# Patient Record
Sex: FEMALE | Race: WHITE | Employment: FULL TIME | URBAN - METROPOLITAN AREA
[De-identification: names, ages, dates, MRNs, and addresses within clinical notes are randomized per-mention and may not be internally consistent; named-entity substitution may affect disease eponyms.]

---

## 2017-02-02 NOTE — TELEPHONE ENCOUNTER
No future appointments. LOV 12/16 F/u ADD    LAST LAB    LAST RX  Rizatriptan Benzoate 10 MG Oral Tab 12 tablet 4 4/15/2016     Please advise on refill.

## 2017-02-03 RX ORDER — RIZATRIPTAN BENZOATE 10 MG/1
TABLET ORAL
Qty: 12 TABLET | Refills: 0 | Status: SHIPPED | OUTPATIENT
Start: 2017-02-03 | End: 2017-03-06

## 2017-03-07 RX ORDER — RIZATRIPTAN BENZOATE 10 MG/1
TABLET ORAL
Qty: 12 TABLET | Refills: 0 | Status: SHIPPED | OUTPATIENT
Start: 2017-03-07 | End: 2017-05-30

## 2017-03-07 NOTE — TELEPHONE ENCOUNTER
No future appointments. LOV  12/16    LAST LAB 8/16    LAST RX  RIZATRIPTAN BENZOATE 10 MG Oral Tab 12 tablet 0 2/3/2017        Please advise on refill.

## 2017-04-20 ENCOUNTER — TELEPHONE (OUTPATIENT)
Dept: FAMILY MEDICINE CLINIC | Facility: CLINIC | Age: 42
End: 2017-04-20

## 2017-04-20 RX ORDER — ALPRAZOLAM 0.25 MG/1
0.25 TABLET ORAL 2 TIMES DAILY PRN
Qty: 30 TABLET | Refills: 0 | Status: SHIPPED | OUTPATIENT
Start: 2017-04-20 | End: 2017-05-30 | Stop reason: ALTCHOICE

## 2017-04-20 NOTE — TELEPHONE ENCOUNTER
Emelyn called sobbing. I asked triage to  the line and was instructed to put her through to voicemail. Lawanna Duverney states her mom had a stroke and has been in a coma for a week. She says it doesn't look like she is going to make it.   Lawanna Duverney says she need

## 2017-04-20 NOTE — TELEPHONE ENCOUNTER
Spoke to the patient advised a Rx would be sent in for her. She has family with her. Send to pharmacy above.

## 2017-04-25 RX ORDER — RIZATRIPTAN BENZOATE 10 MG/1
TABLET ORAL
Qty: 12 TABLET | Refills: 0 | Status: SHIPPED | OUTPATIENT
Start: 2017-04-25 | End: 2017-05-30

## 2017-05-08 ENCOUNTER — OFFICE VISIT (OUTPATIENT)
Dept: FAMILY MEDICINE CLINIC | Facility: CLINIC | Age: 42
End: 2017-05-08

## 2017-05-08 VITALS
RESPIRATION RATE: 16 BRPM | OXYGEN SATURATION: 99 % | TEMPERATURE: 99 F | HEART RATE: 80 BPM | DIASTOLIC BLOOD PRESSURE: 68 MMHG | HEIGHT: 66.5 IN | SYSTOLIC BLOOD PRESSURE: 112 MMHG | BODY MASS INDEX: 21.52 KG/M2 | WEIGHT: 135.5 LBS

## 2017-05-08 DIAGNOSIS — Z79.899 ENCOUNTER FOR DRUG THERAPY: Primary | ICD-10-CM

## 2017-05-08 DIAGNOSIS — F33.1 MODERATE EPISODE OF RECURRENT MAJOR DEPRESSIVE DISORDER (HCC): ICD-10-CM

## 2017-05-08 PROCEDURE — 99213 OFFICE O/P EST LOW 20 MIN: CPT | Performed by: FAMILY MEDICINE

## 2017-05-08 RX ORDER — DULOXETIN HYDROCHLORIDE 30 MG/1
30 CAPSULE, DELAYED RELEASE ORAL DAILY
Qty: 30 CAPSULE | Refills: 0 | Status: SHIPPED | OUTPATIENT
Start: 2017-05-08 | End: 2017-05-30

## 2017-05-08 NOTE — PATIENT INSTRUCTIONS
Depression: Tips to Help Yourself  As your health care providers help treat your depression, you can also help yourself. Keep in mind that your illness affects you emotionally, physically, mentally, and socially. So full recovery will take time.  Take car © 5358-6254 The [address] [place] [address], [address]. All rights reserved. This information is not intended as a substitute for professional medical care. Always follow your healthcare professional's instructions.          Additional sources of help  In addition to a professional counselor, it may help to talk to other people in your life. You may find support and insight from:  · A close friend or family member. · A , , or rabbi trained in counseling.   · A lo

## 2017-05-15 NOTE — PROGRESS NOTES
Rafael Love is a 40year old female. Patient presents with:  Medication Follow-Up: Pt here to discuss medication with doctor. HPI:   Patient states her mother passed away recently. Has been under a lot of emotional stress.   Does have a past HYDROCHLOROTHIAZIDE 12.5 MG Oral Cap TAKE ONE CAPSULE BY MOUTH EVERY DAY Disp: 30 capsule Rfl: 2   RIZATRIPTAN BENZOATE 10 MG Oral Tab TAKE 1 TABLET BY MOUTH AS NEEDED FOR MIGRAINE( AS DIRECTED UP TO 2 TABLETS PER DAY) Disp: 12 tablet Rfl: 0   ALPRAZolam visit:    Encounter for drug therapy    Moderate episode of recurrent major depressive disorder (HCC)  -     DULoxetine HCl (CYMBALTA) 30 MG Oral Cap DR Particles; Take 1 capsule (30 mg total) by mouth daily.

## 2017-05-18 RX ORDER — FAMCICLOVIR 500 MG/1
TABLET, FILM COATED ORAL
Qty: 20 TABLET | Refills: 0 | Status: SHIPPED | OUTPATIENT
Start: 2017-05-18 | End: 2018-03-05 | Stop reason: ALTCHOICE

## 2017-05-18 NOTE — TELEPHONE ENCOUNTER
Herpes Agent Protocol Passed5/18 2:56 AM   Appointment in the past 12 or next 3 months        Future Appointments  Date Time Provider Julian Monique   6/5/2017 8:00 AM Donald Appiah MD [facility] [address]     Last refill 8/1/16 90 +1    refilled per p

## 2017-05-19 ENCOUNTER — PATIENT MESSAGE (OUTPATIENT)
Dept: FAMILY MEDICINE CLINIC | Facility: CLINIC | Age: 42
End: 2017-05-19

## 2017-05-19 NOTE — TELEPHONE ENCOUNTER
From: Joannie Jeans  To: Viji Groves MD  Sent: 5/19/2017 1:57 PM CDT  Subject: Prescription Question    Hi Dr. Fareed Lang,  I have been taking the Celebrex and the side effects have not really subsided.   The nausea and gas (yuck) was not massiel

## 2017-05-29 NOTE — TELEPHONE ENCOUNTER
LOV 5-8-17  Future Appointments  Date Time Provider Julian Belli   5/30/2017 8:15 AM Tete Quinn MD [facility] [address]         LAST LAB 9/4/15    LAST RX  4/25/17  #12 NR    PROTOCOL None

## 2017-05-30 ENCOUNTER — OFFICE VISIT (OUTPATIENT)
Dept: FAMILY MEDICINE CLINIC | Facility: CLINIC | Age: 42
End: 2017-05-30

## 2017-05-30 VITALS
HEART RATE: 92 BPM | WEIGHT: 136.5 LBS | TEMPERATURE: 98 F | SYSTOLIC BLOOD PRESSURE: 112 MMHG | BODY MASS INDEX: 22 KG/M2 | OXYGEN SATURATION: 99 % | RESPIRATION RATE: 18 BRPM | DIASTOLIC BLOOD PRESSURE: 72 MMHG

## 2017-05-30 DIAGNOSIS — G43.009 MIGRAINE WITHOUT AURA AND WITHOUT STATUS MIGRAINOSUS, NOT INTRACTABLE: ICD-10-CM

## 2017-05-30 DIAGNOSIS — F33.1 MODERATE EPISODE OF RECURRENT MAJOR DEPRESSIVE DISORDER (HCC): ICD-10-CM

## 2017-05-30 DIAGNOSIS — F90.0 ATTENTION DEFICIT HYPERACTIVITY DISORDER (ADHD), PREDOMINANTLY INATTENTIVE TYPE: ICD-10-CM

## 2017-05-30 DIAGNOSIS — Z51.81 ENCOUNTER FOR THERAPEUTIC DRUG MONITORING: Primary | ICD-10-CM

## 2017-05-30 DIAGNOSIS — B00.1 RECURRENT COLD SORES: ICD-10-CM

## 2017-05-30 PROCEDURE — 99214 OFFICE O/P EST MOD 30 MIN: CPT | Performed by: FAMILY MEDICINE

## 2017-05-30 RX ORDER — DEXTROAMPHETAMINE SACCHARATE, AMPHETAMINE ASPARTATE MONOHYDRATE, DEXTROAMPHETAMINE SULFATE AND AMPHETAMINE SULFATE 5; 5; 5; 5 MG/1; MG/1; MG/1; MG/1
20 CAPSULE, EXTENDED RELEASE ORAL DAILY
Qty: 30 CAPSULE | Refills: 0 | Status: SHIPPED | OUTPATIENT
Start: 2017-06-29 | End: 2017-08-14 | Stop reason: DRUGHIGH

## 2017-05-30 RX ORDER — RIZATRIPTAN BENZOATE 10 MG/1
TABLET ORAL
Qty: 12 TABLET | Refills: 2 | Status: SHIPPED | OUTPATIENT
Start: 2017-05-30 | End: 2017-09-01

## 2017-05-30 RX ORDER — DEXTROAMPHETAMINE SACCHARATE, AMPHETAMINE ASPARTATE MONOHYDRATE, DEXTROAMPHETAMINE SULFATE AND AMPHETAMINE SULFATE 5; 5; 5; 5 MG/1; MG/1; MG/1; MG/1
20 CAPSULE, EXTENDED RELEASE ORAL DAILY
Qty: 30 CAPSULE | Refills: 0 | Status: SHIPPED | OUTPATIENT
Start: 2017-07-29 | End: 2017-08-14 | Stop reason: DRUGHIGH

## 2017-05-30 RX ORDER — FAMCICLOVIR 250 MG/1
250 TABLET, FILM COATED ORAL DAILY
Qty: 90 TABLET | Refills: 1 | COMMUNITY
Start: 2017-05-30 | End: 2017-05-30

## 2017-05-30 RX ORDER — RIZATRIPTAN BENZOATE 10 MG/1
TABLET ORAL
Qty: 12 TABLET | Refills: 0 | OUTPATIENT
Start: 2017-05-30

## 2017-05-30 RX ORDER — DEXTROAMPHETAMINE SACCHARATE, AMPHETAMINE ASPARTATE, DEXTROAMPHETAMINE SULFATE AND AMPHETAMINE SULFATE 7.5; 7.5; 7.5; 7.5 MG/1; MG/1; MG/1; MG/1
30 TABLET ORAL DAILY
Qty: 30 TABLET | Refills: 0 | Status: SHIPPED | OUTPATIENT
Start: 2017-06-29 | End: 2017-07-29

## 2017-05-30 RX ORDER — FAMCICLOVIR 250 MG/1
250 TABLET, FILM COATED ORAL DAILY
Qty: 90 TABLET | Refills: 1 | Status: SHIPPED | OUTPATIENT
Start: 2017-05-30 | End: 2017-10-23

## 2017-05-30 RX ORDER — DEXTROAMPHETAMINE SACCHARATE, AMPHETAMINE ASPARTATE MONOHYDRATE, DEXTROAMPHETAMINE SULFATE AND AMPHETAMINE SULFATE 5; 5; 5; 5 MG/1; MG/1; MG/1; MG/1
20 CAPSULE, EXTENDED RELEASE ORAL DAILY
Qty: 30 CAPSULE | Refills: 0 | Status: SHIPPED | OUTPATIENT
Start: 2017-05-30 | End: 2017-08-14 | Stop reason: DRUGHIGH

## 2017-05-30 RX ORDER — DEXTROAMPHETAMINE SACCHARATE, AMPHETAMINE ASPARTATE, DEXTROAMPHETAMINE SULFATE AND AMPHETAMINE SULFATE 7.5; 7.5; 7.5; 7.5 MG/1; MG/1; MG/1; MG/1
30 TABLET ORAL DAILY
Qty: 30 TABLET | Refills: 0 | Status: SHIPPED | OUTPATIENT
Start: 2017-05-30 | End: 2017-06-29

## 2017-05-30 RX ORDER — BUPROPION HYDROCHLORIDE 150 MG/1
150 TABLET ORAL DAILY
Qty: 30 TABLET | Refills: 0 | Status: SHIPPED | OUTPATIENT
Start: 2017-05-30 | End: 2017-06-12

## 2017-05-30 RX ORDER — DEXTROAMPHETAMINE SACCHARATE, AMPHETAMINE ASPARTATE, DEXTROAMPHETAMINE SULFATE AND AMPHETAMINE SULFATE 7.5; 7.5; 7.5; 7.5 MG/1; MG/1; MG/1; MG/1
30 TABLET ORAL DAILY
Qty: 30 TABLET | Refills: 0 | Status: SHIPPED | OUTPATIENT
Start: 2017-07-29 | End: 2017-08-28

## 2017-05-30 NOTE — PATIENT INSTRUCTIONS
Counseling for Depression  Counseling, also called talk therapy, has been found to be as effective as medication in treating mild to moderate depression.  When done by a trained professional, this treatment is a powerful way to better understand your thou · A local support group or community group. · A 12-step program (such as Alcoholics Anonymous) for dealing with problems that can contribute to depression, such as alcohol or drug addiction.   Date Last Reviewed: 1/19/2015  © 4553-5207 The [employer]

## 2017-05-30 NOTE — PROGRESS NOTES
Winsome Lim is a 40year old female. Patient presents with: Follow - Up: Pt here to discuss changing her medication currently not doing well on it.     HPI:   Depression: Patient is seen for follow-up after starting Cymbalta, and patient states (ADDERALL XR) 20 MG Oral Capsule SR 24 Hr Take 1 capsule (20 mg total) by mouth daily. Disp: 30 capsule Rfl: 0   [START ON 7/29/2017] Amphetamine-Dextroamphet ER (ADDERALL XR) 20 MG Oral Capsule SR 24 Hr Take 1 capsule (20 mg total) by mouth daily.  Disp: 3 simplex      lips, monthly.    • ADD (attention deficit disorder)    • Unspecified hypothyroidism    • Chronic migraine without aura, without mention of intractable migraine without mention of status migrainosus    • Contact with or exposure to tuberculosis Amphetamine-Dextroamphet ER (ADDERALL XR) 20 MG Oral Capsule SR 24 Hr; Take 1 capsule (20 mg total) by mouth daily. -     amphetamine-dextroamphetamine (ADDERALL) 30 MG Oral Tab; Take 1 tablet (30 mg total) by mouth daily.   -     amphetamine-dextroampheta

## 2017-06-12 ENCOUNTER — OFFICE VISIT (OUTPATIENT)
Dept: FAMILY MEDICINE CLINIC | Facility: CLINIC | Age: 42
End: 2017-06-12

## 2017-06-12 VITALS
SYSTOLIC BLOOD PRESSURE: 112 MMHG | WEIGHT: 133.38 LBS | TEMPERATURE: 99 F | BODY MASS INDEX: 21 KG/M2 | HEART RATE: 98 BPM | OXYGEN SATURATION: 99 % | RESPIRATION RATE: 18 BRPM | DIASTOLIC BLOOD PRESSURE: 62 MMHG

## 2017-06-12 DIAGNOSIS — F33.1 MODERATE EPISODE OF RECURRENT MAJOR DEPRESSIVE DISORDER (HCC): ICD-10-CM

## 2017-06-12 DIAGNOSIS — Z51.81 ENCOUNTER FOR THERAPEUTIC DRUG MONITORING: Primary | ICD-10-CM

## 2017-06-12 PROCEDURE — 99213 OFFICE O/P EST LOW 20 MIN: CPT | Performed by: FAMILY MEDICINE

## 2017-06-12 RX ORDER — BUPROPION HYDROCHLORIDE 150 MG/1
150 TABLET ORAL DAILY
Qty: 30 TABLET | Refills: 0 | Status: SHIPPED | OUTPATIENT
Start: 2017-06-12 | End: 2017-08-04

## 2017-06-12 NOTE — PROGRESS NOTES
Sandie Urrutia is a 40year old female. Patient presents with: Follow - Up: Pt her for 2 week f/u of new medication states she is doing ok on it.     HPI:   Patient is seen for follow up of depression, states is tolerating wellbutrin better, does total) by mouth daily. Disp: 30 capsule Rfl: 0   [START ON 6/29/2017] amphetamine-dextroamphetamine (ADDERALL) 30 MG Oral Tab Take 1 tablet (30 mg total) by mouth daily.  Disp: 30 tablet Rfl: 0   [START ON 7/29/2017] amphetamine-dextroamphetamine (ADDERALL)

## 2017-08-04 ENCOUNTER — TELEPHONE (OUTPATIENT)
Dept: FAMILY MEDICINE CLINIC | Facility: CLINIC | Age: 42
End: 2017-08-04

## 2017-08-04 DIAGNOSIS — F33.1 MODERATE EPISODE OF RECURRENT MAJOR DEPRESSIVE DISORDER (HCC): ICD-10-CM

## 2017-08-04 RX ORDER — BUPROPION HYDROCHLORIDE 150 MG/1
150 TABLET ORAL DAILY
Qty: 15 TABLET | Refills: 0 | Status: SHIPPED | OUTPATIENT
Start: 2017-08-04 | End: 2017-08-14 | Stop reason: DRUGHIGH

## 2017-08-04 NOTE — TELEPHONE ENCOUNTER
Left message for pt    Dr Isaias Morris refilled x 15.  Pt should stay on meds up through appt    Future Appointments  Date Time Provider Julian Monique   8/11/2017 9:30 AM Alfredito Reed MD [facility] [address] [facility]

## 2017-08-04 NOTE — TELEPHONE ENCOUNTER
Pt was recently put on depression medication and was unable to make her f/u appt. She is now down to 5 days left and her appt is for 8/11/17. She is ok with out the pills until then but is concerned whether Dr is ok with her being with out medication.  If s

## 2017-08-14 ENCOUNTER — OFFICE VISIT (OUTPATIENT)
Dept: FAMILY MEDICINE CLINIC | Facility: CLINIC | Age: 42
End: 2017-08-14

## 2017-08-14 VITALS
BODY MASS INDEX: 22 KG/M2 | HEART RATE: 84 BPM | WEIGHT: 140.25 LBS | SYSTOLIC BLOOD PRESSURE: 112 MMHG | DIASTOLIC BLOOD PRESSURE: 72 MMHG | OXYGEN SATURATION: 99 % | TEMPERATURE: 99 F | RESPIRATION RATE: 16 BRPM

## 2017-08-14 DIAGNOSIS — F33.1 MODERATE EPISODE OF RECURRENT MAJOR DEPRESSIVE DISORDER (HCC): ICD-10-CM

## 2017-08-14 DIAGNOSIS — E03.9 HYPOTHYROIDISM, UNSPECIFIED TYPE: ICD-10-CM

## 2017-08-14 DIAGNOSIS — F98.8 ATTENTION DEFICIT DISORDER (ADD) WITHOUT HYPERACTIVITY: ICD-10-CM

## 2017-08-14 DIAGNOSIS — Z79.899 ENCOUNTER FOR LONG-TERM CURRENT USE OF MEDICATION: Primary | ICD-10-CM

## 2017-08-14 PROCEDURE — 99214 OFFICE O/P EST MOD 30 MIN: CPT | Performed by: FAMILY MEDICINE

## 2017-08-14 RX ORDER — BUPROPION HYDROCHLORIDE 150 MG/1
TABLET ORAL
Qty: 15 TABLET | Refills: 0 | OUTPATIENT
Start: 2017-08-14

## 2017-08-14 RX ORDER — DEXTROAMPHETAMINE SACCHARATE, AMPHETAMINE ASPARTATE, DEXTROAMPHETAMINE SULFATE AND AMPHETAMINE SULFATE 7.5; 7.5; 7.5; 7.5 MG/1; MG/1; MG/1; MG/1
30 TABLET ORAL 2 TIMES DAILY
Qty: 60 TABLET | Refills: 0 | Status: SHIPPED | OUTPATIENT
Start: 2017-09-02 | End: 2017-10-02

## 2017-08-14 RX ORDER — LEVOTHYROXINE SODIUM 0.03 MG/1
25 TABLET ORAL DAILY
Qty: 90 TABLET | Refills: 3 | Status: SHIPPED | OUTPATIENT
Start: 2017-08-14 | End: 2018-08-24

## 2017-08-14 RX ORDER — BUPROPION HYDROCHLORIDE 300 MG/1
300 TABLET ORAL DAILY
Qty: 30 TABLET | Refills: 2 | Status: SHIPPED | OUTPATIENT
Start: 2017-08-14 | End: 2017-09-13

## 2017-08-14 RX ORDER — DEXTROAMPHETAMINE SACCHARATE, AMPHETAMINE ASPARTATE, DEXTROAMPHETAMINE SULFATE AND AMPHETAMINE SULFATE 7.5; 7.5; 7.5; 7.5 MG/1; MG/1; MG/1; MG/1
30 TABLET ORAL 2 TIMES DAILY
Qty: 60 TABLET | Refills: 0 | Status: SHIPPED | OUTPATIENT
Start: 2017-10-03 | End: 2017-11-02

## 2017-08-14 RX ORDER — DEXTROAMPHETAMINE SACCHARATE, AMPHETAMINE ASPARTATE, DEXTROAMPHETAMINE SULFATE AND AMPHETAMINE SULFATE 7.5; 7.5; 7.5; 7.5 MG/1; MG/1; MG/1; MG/1
30 TABLET ORAL 2 TIMES DAILY
Qty: 60 TABLET | Refills: 0 | Status: SHIPPED | OUTPATIENT
Start: 2017-11-03 | End: 2017-12-03

## 2017-08-14 NOTE — PATIENT INSTRUCTIONS
Please take the increased dose of wellbutrin as prescribed, If you notice any side effects please send me a message.

## 2017-08-14 NOTE — PROGRESS NOTES
Allyn Mayo is a 40year old female. Patient presents with:  Medication Follow-Up: one month f/u. Wellbutrin. Requesting refill for Adderall. HPI:   ADD: Patient is seen for follow-up and medication refill.   States has been taking 30 mg in MG Oral Tab Take 1 tablet (30 mg total) by mouth daily. Disp: 30 tablet Rfl: 0   OCELLA 3-0.03 MG Oral Tab TAKE 1 TABLET BY MOUTH EVERY DAY Disp: 28 tablet Rfl: 5   Levothyroxine Sodium (SYNTHROID) 25 MCG Oral Tab Take 1 tablet by mouth daily.  Disp: 90 tab edema  PSYCH: well groomed, appropriate mood and affect, good eye contact, not thought disorder  NEURO: DTR 2+ and symmetrical bilateral    ASSESSMENT AND PLAN:   Yocasta Wood was seen today for medication follow-up.     Diagnoses and all orders for this visit:

## 2017-09-01 DIAGNOSIS — G43.009 MIGRAINE WITHOUT AURA AND WITHOUT STATUS MIGRAINOSUS, NOT INTRACTABLE: ICD-10-CM

## 2017-09-01 RX ORDER — RIZATRIPTAN BENZOATE 10 MG/1
TABLET ORAL
Qty: 12 TABLET | Refills: 0 | Status: SHIPPED | OUTPATIENT
Start: 2017-09-01 | End: 2017-12-04

## 2017-09-19 RX ORDER — DROSPIRENONE AND ETHINYL ESTRADIOL 0.03MG-3MG
KIT ORAL
Qty: 84 TABLET | Refills: 1 | OUTPATIENT
Start: 2017-09-19

## 2017-09-19 NOTE — TELEPHONE ENCOUNTER
No future appointments. LOV 12/16     LAST LAB no pap in chart last one in 5/2012 in [facility]  No mammogram in chart ordered but not done.      LAST RX   OCELLA 3-0.03 MG Oral Tab 28 tablet 5 12/16/2016       PROTOCOL    Gynecology Medication Protocol Azell Warrick

## 2017-10-16 DIAGNOSIS — G43.009 MIGRAINE WITHOUT AURA AND WITHOUT STATUS MIGRAINOSUS, NOT INTRACTABLE: ICD-10-CM

## 2017-10-16 NOTE — TELEPHONE ENCOUNTER
Future Appointments  Date Time Provider Julian Amaya   10/23/2017 9:30 AM Parul Chavez MD [facility] [address]     LOV 5/17    LAST LAB 9/15    LAST RX  RIZATRIPTAN BENZOATE 10 MG Oral Tab 12 tablet 0 9/1/2017       Please advise. No protocol.  If fi

## 2017-10-17 NOTE — TELEPHONE ENCOUNTER
Left detailed message for patient on phone. That PCP would prefer to fill at her appointment. But if refill is needed to call back.

## 2017-10-19 ENCOUNTER — PATIENT MESSAGE (OUTPATIENT)
Dept: FAMILY MEDICINE CLINIC | Facility: CLINIC | Age: 42
End: 2017-10-19

## 2017-10-19 DIAGNOSIS — G43.009 MIGRAINE WITHOUT AURA AND WITHOUT STATUS MIGRAINOSUS, NOT INTRACTABLE: ICD-10-CM

## 2017-10-19 NOTE — TELEPHONE ENCOUNTER
From: Temo Gtz  Sent: 10/19/2017  3:10 AM CDT  Subject: Medication Renewal Request    Flakita Garcia.  Leodan Hoffmann would like a refill of the following medications:        RIZATRIPTAN BENZOATE 10 MG Oral Tab [GERMAN Townsend MD]      Patient Comment

## 2017-10-19 NOTE — TELEPHONE ENCOUNTER
Sent another message to pt to see if she will be out before her upcoming appt    We did leave a message on 10/16/17 and never heard back    Future Appointments  Date Time Provider Julian Amaya   10/23/2017 9:30 AM Nikki Gonzales MD [facility] [address]

## 2017-10-19 NOTE — TELEPHONE ENCOUNTER
From: Jessica Thomson  To: Nikki Gonzales MD  Sent: 10/19/2017 3:19 AM CDT  Subject: Prescription Question    Dr. Светлана Mackenzie,  As you recommended, I have been seeing a therapist in light of my mother’s sudden death.   She feels Wellbutrin isn’t wor

## 2017-10-23 ENCOUNTER — OFFICE VISIT (OUTPATIENT)
Dept: FAMILY MEDICINE CLINIC | Facility: CLINIC | Age: 42
End: 2017-10-23

## 2017-10-23 VITALS
RESPIRATION RATE: 16 BRPM | DIASTOLIC BLOOD PRESSURE: 68 MMHG | TEMPERATURE: 98 F | WEIGHT: 141.13 LBS | SYSTOLIC BLOOD PRESSURE: 100 MMHG | OXYGEN SATURATION: 98 % | HEART RATE: 76 BPM | HEIGHT: 66.5 IN | BODY MASS INDEX: 22.42 KG/M2

## 2017-10-23 DIAGNOSIS — F33.1 MODERATE EPISODE OF RECURRENT MAJOR DEPRESSIVE DISORDER (HCC): ICD-10-CM

## 2017-10-23 DIAGNOSIS — G43.009 MIGRAINE WITHOUT AURA AND WITHOUT STATUS MIGRAINOSUS, NOT INTRACTABLE: ICD-10-CM

## 2017-10-23 DIAGNOSIS — R09.82 POST-NASAL DRAINAGE: ICD-10-CM

## 2017-10-23 DIAGNOSIS — B00.1 RECURRENT COLD SORES: ICD-10-CM

## 2017-10-23 DIAGNOSIS — Z00.00 ROUTINE GENERAL MEDICAL EXAMINATION AT A HEALTH CARE FACILITY: Primary | ICD-10-CM

## 2017-10-23 DIAGNOSIS — Z12.39 SCREENING FOR BREAST CANCER: ICD-10-CM

## 2017-10-23 DIAGNOSIS — Z30.41 SURVEILLANCE OF CONTRACEPTIVE PILL: ICD-10-CM

## 2017-10-23 DIAGNOSIS — Z01.419 ENCOUNTER FOR ROUTINE GYNECOLOGICAL EXAMINATION WITH PAPANICOLAOU SMEAR OF CERVIX: ICD-10-CM

## 2017-10-23 PROCEDURE — 88175 CYTOPATH C/V AUTO FLUID REDO: CPT | Performed by: FAMILY MEDICINE

## 2017-10-23 PROCEDURE — 99213 OFFICE O/P EST LOW 20 MIN: CPT | Performed by: FAMILY MEDICINE

## 2017-10-23 PROCEDURE — 87624 HPV HI-RISK TYP POOLED RSLT: CPT | Performed by: FAMILY MEDICINE

## 2017-10-23 PROCEDURE — 99396 PREV VISIT EST AGE 40-64: CPT | Performed by: FAMILY MEDICINE

## 2017-10-23 RX ORDER — SERTRALINE HYDROCHLORIDE 25 MG/1
TABLET, FILM COATED ORAL
Qty: 45 TABLET | Refills: 0 | Status: SHIPPED | OUTPATIENT
Start: 2017-10-23 | End: 2017-11-17

## 2017-10-23 RX ORDER — FAMCICLOVIR 250 MG/1
250 TABLET, FILM COATED ORAL DAILY
Qty: 90 TABLET | Refills: 1 | Status: SHIPPED | OUTPATIENT
Start: 2017-10-23 | End: 2019-01-14

## 2017-10-23 RX ORDER — RIZATRIPTAN BENZOATE 10 MG/1
TABLET ORAL
Qty: 12 TABLET | Refills: 0
Start: 2017-10-23

## 2017-10-23 RX ORDER — RIZATRIPTAN BENZOATE 10 MG/1
TABLET ORAL
Qty: 12 TABLET | Refills: 0 | OUTPATIENT
Start: 2017-10-23

## 2017-10-23 RX ORDER — DROSPIRENONE AND ETHINYL ESTRADIOL 0.03MG-3MG
1 KIT ORAL
Qty: 3 PACKAGE | Refills: 3 | Status: SHIPPED | OUTPATIENT
Start: 2017-10-23 | End: 2018-03-03

## 2017-10-23 RX ORDER — RIZATRIPTAN BENZOATE 10 MG/1
TABLET ORAL
Qty: 12 TABLET | Refills: 3 | Status: SHIPPED | OUTPATIENT
Start: 2017-10-23 | End: 2018-02-12

## 2017-10-23 NOTE — PATIENT INSTRUCTIONS
Take over the counter claritin D and flonase for 1 week. Please take Wellbutrin ever other day for 1 week and stop, start zoloft 25 mg for 1 week and then increase to 50mg.       Prevention Guidelines, Women Ages 36 to 52  Screening tests and vaccines ar HIV All women At routine exams   Obesity All women in this age group At routine exams   Syphilis Women at increased risk for infection – talk with your healthcare provider At routine exams   Tuberculosis Women at increased risk for infection – talk with yo BRCA gene mutation testing for breast and ovarian cancer susceptibility Women with increased risk for having gene mutation When your risk is known   Breast cancer and chemoprevention Women at high risk for breast cancer When your risk is known   Diet and e

## 2017-10-30 NOTE — PROGRESS NOTES
Jacque Daniel is a 40year old female here for   Patient presents with:   Well Adult: Cpx and pap    HPI:   Patient is seen today for her annual physical and pap    Migraine: patient states takes rizatriptan a couple of times a week and is better w tablet Rfl: 0   [START ON 11/3/2017] amphetamine-dextroamphetamine (ADDERALL) 30 MG Oral Tab Take 1 tablet (30 mg total) by mouth 2 (two) times daily.  Disp: 60 tablet Rfl: 0   Levothyroxine Sodium (SYNTHROID) 25 MCG Oral Tab Take 1 tablet (25 mcg total) by No change in appetite, heartburn, diarrhea, constipation, or blood in stool. No hemorrhoids. : No pain, frequency, urgency or incontinence with urination.   Female: periods are regular, not excessive, no dysmenorrhea, no sexual dysfuction  Rheumatologic: facility  -     CBC WITH DIFFERENTIAL WITH PLATELET; Future  -     ASSAY, THYROID STIM HORMONE; Future  -     LIPID PANEL; Future  -     COMP METABOLIC PANEL (14);  Future  -     VITAMIN D, 25-HYDROXY; Future    Encounter for routine gynecological examinati

## 2017-11-16 DIAGNOSIS — F33.1 MODERATE EPISODE OF RECURRENT MAJOR DEPRESSIVE DISORDER (HCC): ICD-10-CM

## 2017-11-16 RX ORDER — SERTRALINE HYDROCHLORIDE 25 MG/1
TABLET, FILM COATED ORAL
Qty: 45 TABLET | Refills: 0 | Status: CANCELLED | OUTPATIENT
Start: 2017-11-16

## 2017-11-17 ENCOUNTER — PATIENT MESSAGE (OUTPATIENT)
Dept: FAMILY MEDICINE CLINIC | Facility: CLINIC | Age: 42
End: 2017-11-17

## 2017-11-17 DIAGNOSIS — F33.1 MODERATE EPISODE OF RECURRENT MAJOR DEPRESSIVE DISORDER (HCC): ICD-10-CM

## 2017-11-17 NOTE — TELEPHONE ENCOUNTER
From: Alcides List  Sent: 11/17/2017 8:40 AM CST  Subject: Medication Renewal Request    Wendi Armin.  Maciel Ratliff would like a refill of the following medications:     Sertraline HCl (ZOLOFT) 25 MG Oral Tab [[id number] MD Kristian]   Patient Comment: Cu

## 2017-11-17 NOTE — TELEPHONE ENCOUNTER
Instructions         Return in about 1 month (around 11/23/2017).      Pt was given one month refill at Physical 10/23/17    PLease call pt to schedule, if unable to get on by Wed 11/22, resend to Dr Elva Castanon to approve partial refill

## 2017-11-17 NOTE — TELEPHONE ENCOUNTER
Called patient and LVM that she was given 1 month refill at her physical appointment on 10/23/17, and asked to return in 1 month for a medication check.

## 2017-11-17 NOTE — TELEPHONE ENCOUNTER
Patient states she saw an option of EVisit on MyChart and she would be fine with doing that for this case. Please advise.

## 2017-11-17 NOTE — TELEPHONE ENCOUNTER
No future appointments. LOV 8/17    LAST LAB    LAST RX   Sertraline HCl (ZOLOFT) 25 MG Oral Tab 45 tablet 0 10/23/2017    Sig :  1 tablet for 1 week and then 2 tablets daily           PROTOCOL   Please advise. No protocol.

## 2017-11-17 NOTE — TELEPHONE ENCOUNTER
Patient returned our call stating she wasn't under the impression that she needed to return until her next script for Adderall was needed. She thought she was okay if she reached out to check in with Dr Ran Matias every so often.   She states she has been in

## 2017-11-17 NOTE — TELEPHONE ENCOUNTER
My chart message sent to patient to call the office for an appointment. Ok to schedule on Saturday 25th if she can come on on that date. New medication started at her physical appointment.

## 2017-11-17 NOTE — TELEPHONE ENCOUNTER
My chart message sent to patient, needs to make a follow up appointment. Ok to give partial refill after appointment is made.

## 2017-11-18 NOTE — TELEPHONE ENCOUNTER
From: Joannie Jeans  To: Viji Groves MD  Sent: 11/17/2017 8:47 AM CST  Subject: Prescription Question    Hi Dr. Fareed Lang,  I wanted to give you a update on the Zoloft. I don’t “feel” any different in terms of sadness. ..but I don’t cry as of

## 2017-11-18 NOTE — TELEPHONE ENCOUNTER
From: Santos Jaramillo  Sent: 11/17/2017 8:38 PM CST  To: [facility] Clinical Staff  Subject: RE:sertraline refill. Ok. Slater August Slater August I thought an update was ok. I will make an appt for as soon as I can get in.   Which Pharmacy did u send Rx to, the msg does not i

## 2017-11-20 NOTE — TELEPHONE ENCOUNTER
RE: Sertraline. [facility] in [facility] called to find out status of medication. Stated pt is trying to  medication at this location, which is a pharmacy in [facility].      Pharmacist asked where medication was sent - explained it was sent to [place]

## 2017-11-29 ENCOUNTER — PATIENT MESSAGE (OUTPATIENT)
Dept: FAMILY MEDICINE CLINIC | Facility: CLINIC | Age: 42
End: 2017-11-29

## 2017-11-29 ENCOUNTER — TELEPHONE (OUTPATIENT)
Dept: FAMILY MEDICINE CLINIC | Facility: CLINIC | Age: 42
End: 2017-11-29

## 2017-11-29 NOTE — TELEPHONE ENCOUNTER
From: Emilia Mendez  To: Rafaela Mills MD  Sent: 11/29/2017 4:37 PM CST  Subject: Visit Follow-up Question    I received your voice message and am so sorry. ... ? ?

## 2017-11-29 NOTE — TELEPHONE ENCOUNTER
appt made    Future Appointments  Date Time Provider Julian Monique   12/4/2017 11:00 AM Ashlee Huff MD [facility] [address]

## 2017-12-04 ENCOUNTER — OFFICE VISIT (OUTPATIENT)
Dept: FAMILY MEDICINE CLINIC | Facility: CLINIC | Age: 42
End: 2017-12-04

## 2017-12-04 ENCOUNTER — LAB ENCOUNTER (OUTPATIENT)
Dept: LAB | Age: 42
End: 2017-12-04
Attending: FAMILY MEDICINE
Payer: COMMERCIAL

## 2017-12-04 VITALS
TEMPERATURE: 98 F | WEIGHT: 147.13 LBS | SYSTOLIC BLOOD PRESSURE: 102 MMHG | DIASTOLIC BLOOD PRESSURE: 78 MMHG | RESPIRATION RATE: 18 BRPM | BODY MASS INDEX: 23 KG/M2 | HEART RATE: 98 BPM

## 2017-12-04 DIAGNOSIS — Z00.00 ROUTINE GENERAL MEDICAL EXAMINATION AT A HEALTH CARE FACILITY: ICD-10-CM

## 2017-12-04 DIAGNOSIS — Z79.899 ENCOUNTER FOR LONG-TERM (CURRENT) USE OF MEDICATIONS: Primary | ICD-10-CM

## 2017-12-04 DIAGNOSIS — F90.0 ATTENTION DEFICIT HYPERACTIVITY DISORDER (ADHD), PREDOMINANTLY INATTENTIVE TYPE: ICD-10-CM

## 2017-12-04 DIAGNOSIS — F33.1 MODERATE EPISODE OF RECURRENT MAJOR DEPRESSIVE DISORDER (HCC): ICD-10-CM

## 2017-12-04 PROCEDURE — 82306 VITAMIN D 25 HYDROXY: CPT | Performed by: FAMILY MEDICINE

## 2017-12-04 PROCEDURE — 80050 GENERAL HEALTH PANEL: CPT | Performed by: FAMILY MEDICINE

## 2017-12-04 PROCEDURE — 99213 OFFICE O/P EST LOW 20 MIN: CPT | Performed by: FAMILY MEDICINE

## 2017-12-04 PROCEDURE — 80061 LIPID PANEL: CPT | Performed by: FAMILY MEDICINE

## 2017-12-04 RX ORDER — DEXTROAMPHETAMINE SACCHARATE, AMPHETAMINE ASPARTATE, DEXTROAMPHETAMINE SULFATE AND AMPHETAMINE SULFATE 7.5; 7.5; 7.5; 7.5 MG/1; MG/1; MG/1; MG/1
30 TABLET ORAL 3 TIMES DAILY
Qty: 90 TABLET | Refills: 0 | Status: SHIPPED | OUTPATIENT
Start: 2017-12-04 | End: 2018-01-03

## 2017-12-04 RX ORDER — DEXTROAMPHETAMINE SACCHARATE, AMPHETAMINE ASPARTATE, DEXTROAMPHETAMINE SULFATE AND AMPHETAMINE SULFATE 7.5; 7.5; 7.5; 7.5 MG/1; MG/1; MG/1; MG/1
30 TABLET ORAL 3 TIMES DAILY
Qty: 90 TABLET | Refills: 0 | Status: SHIPPED | OUTPATIENT
Start: 2018-01-03 | End: 2018-02-02

## 2017-12-04 RX ORDER — DEXTROAMPHETAMINE SACCHARATE, AMPHETAMINE ASPARTATE, DEXTROAMPHETAMINE SULFATE AND AMPHETAMINE SULFATE 7.5; 7.5; 7.5; 7.5 MG/1; MG/1; MG/1; MG/1
30 TABLET ORAL 3 TIMES DAILY
Qty: 90 TABLET | Refills: 0 | Status: SHIPPED | OUTPATIENT
Start: 2018-02-02 | End: 2018-03-04

## 2017-12-04 NOTE — PATIENT INSTRUCTIONS
Depression: Tips to Help Yourself  As your healthcare providers help treat your depression, you can also help yourself. Keep in mind that your illness affects you emotionally, physically, mentally, and socially. So full recovery will take time.  Take care · Be with others. Don’t isolate yourself—you’ll only feel worse. Try to be with other people. And take part in fun activities when you can. Go to a movie, ballgame, [employer] service, or social event.  Talk openly with people you can trust. And accept help

## 2017-12-11 NOTE — PROGRESS NOTES
Alcides Blackwell is a 40year old female. Patient presents with:  Medication Follow-Up: Refill for Sertraline and adderall.      HPI:   depression: Patient is seen for follow-up after starting sertraline for depression, patient states mood seems to b

## 2018-01-25 ENCOUNTER — PATIENT MESSAGE (OUTPATIENT)
Dept: FAMILY MEDICINE CLINIC | Facility: CLINIC | Age: 43
End: 2018-01-25

## 2018-01-26 NOTE — TELEPHONE ENCOUNTER
From: Tristan Cash  To: Aleksandr Castellanos MD  Sent: 1/25/2018 9:11 PM CST  Subject: Referral Request    Also, the Adderal 30mg 3x is working really well. ...but um. ..my dog ate my last Rx. No joke.   Apparently my pup likes [employer] pumpkin rehana

## 2018-01-26 NOTE — TELEPHONE ENCOUNTER
From: Derrell García  To: Jessy Isabel MD  Sent: 1/25/2018 9:00 PM CST  Subject: Other    #3 Rx Update cont. — NOT urgent—    So. Cezar Lofton Two snack-size baggies under each breast (I may have invented a new push-up bra!), one lodged in the bra strap

## 2018-01-26 NOTE — TELEPHONE ENCOUNTER
From: Joseph Chen  To: Delma Omer MD  Sent: 1/25/2018 9:02 PM CST  Subject: Other    #4 Rx Update final — NOT Urgent—    Thus, I have begun weening myself down, going from 75mg to 50mg, and will go down again in a week or so.     From the

## 2018-01-27 ENCOUNTER — PATIENT MESSAGE (OUTPATIENT)
Dept: FAMILY MEDICINE CLINIC | Facility: CLINIC | Age: 43
End: 2018-01-27

## 2018-01-29 NOTE — TELEPHONE ENCOUNTER
From: Wilmar Montaño  To: Ashlee Huff MD  Sent: 1/27/2018 9:55 AM CST  Subject: Other    Hi,  I tried calling your office a few minutes ago but I got the on call message saying your office was closed. .. :(

## 2018-02-11 ENCOUNTER — PATIENT MESSAGE (OUTPATIENT)
Dept: FAMILY MEDICINE CLINIC | Facility: CLINIC | Age: 43
End: 2018-02-11

## 2018-02-12 ENCOUNTER — OFFICE VISIT (OUTPATIENT)
Dept: FAMILY MEDICINE CLINIC | Facility: CLINIC | Age: 43
End: 2018-02-12

## 2018-02-12 VITALS
OXYGEN SATURATION: 99 % | SYSTOLIC BLOOD PRESSURE: 108 MMHG | HEART RATE: 104 BPM | WEIGHT: 151.63 LBS | HEIGHT: 66.5 IN | RESPIRATION RATE: 17 BRPM | BODY MASS INDEX: 24.08 KG/M2 | DIASTOLIC BLOOD PRESSURE: 70 MMHG | TEMPERATURE: 98 F

## 2018-02-12 DIAGNOSIS — F33.1 MODERATE EPISODE OF RECURRENT MAJOR DEPRESSIVE DISORDER (HCC): ICD-10-CM

## 2018-02-12 DIAGNOSIS — G43.009 MIGRAINE WITHOUT AURA AND WITHOUT STATUS MIGRAINOSUS, NOT INTRACTABLE: ICD-10-CM

## 2018-02-12 DIAGNOSIS — Z51.81 ENCOUNTER FOR THERAPEUTIC DRUG MONITORING: Primary | ICD-10-CM

## 2018-02-12 DIAGNOSIS — F41.9 ANXIETY: ICD-10-CM

## 2018-02-12 DIAGNOSIS — F98.8 ATTENTION DEFICIT DISORDER (ADD) WITHOUT HYPERACTIVITY: ICD-10-CM

## 2018-02-12 PROCEDURE — 99214 OFFICE O/P EST MOD 30 MIN: CPT | Performed by: FAMILY MEDICINE

## 2018-02-12 RX ORDER — RIZATRIPTAN BENZOATE 10 MG/1
TABLET ORAL
Qty: 12 TABLET | Refills: 3 | Status: SHIPPED | OUTPATIENT
Start: 2018-02-12 | End: 2019-01-22

## 2018-02-12 RX ORDER — DEXTROAMPHETAMINE SACCHARATE, AMPHETAMINE ASPARTATE, DEXTROAMPHETAMINE SULFATE AND AMPHETAMINE SULFATE 7.5; 7.5; 7.5; 7.5 MG/1; MG/1; MG/1; MG/1
30 TABLET ORAL 3 TIMES DAILY
Qty: 90 TABLET | Refills: 0 | Status: SHIPPED | OUTPATIENT
Start: 2018-03-14 | End: 2018-04-13

## 2018-02-12 RX ORDER — DEXTROAMPHETAMINE SACCHARATE, AMPHETAMINE ASPARTATE, DEXTROAMPHETAMINE SULFATE AND AMPHETAMINE SULFATE 7.5; 7.5; 7.5; 7.5 MG/1; MG/1; MG/1; MG/1
30 TABLET ORAL DAILY
Qty: 30 TABLET | Refills: 0 | Status: SHIPPED | OUTPATIENT
Start: 2018-03-14 | End: 2018-02-12 | Stop reason: CLARIF

## 2018-02-12 RX ORDER — DEXTROAMPHETAMINE SACCHARATE, AMPHETAMINE ASPARTATE, DEXTROAMPHETAMINE SULFATE AND AMPHETAMINE SULFATE 7.5; 7.5; 7.5; 7.5 MG/1; MG/1; MG/1; MG/1
30 TABLET ORAL 3 TIMES DAILY
Qty: 90 TABLET | Refills: 0 | Status: SHIPPED | OUTPATIENT
Start: 2018-04-14 | End: 2018-05-12

## 2018-02-12 RX ORDER — DEXTROAMPHETAMINE SACCHARATE, AMPHETAMINE ASPARTATE, DEXTROAMPHETAMINE SULFATE AND AMPHETAMINE SULFATE 7.5; 7.5; 7.5; 7.5 MG/1; MG/1; MG/1; MG/1
30 TABLET ORAL DAILY
Qty: 30 TABLET | Refills: 0 | Status: SHIPPED | OUTPATIENT
Start: 2018-04-13 | End: 2018-02-12 | Stop reason: CLARIF

## 2018-02-12 RX ORDER — DEXTROAMPHETAMINE SACCHARATE, AMPHETAMINE ASPARTATE, DEXTROAMPHETAMINE SULFATE AND AMPHETAMINE SULFATE 7.5; 7.5; 7.5; 7.5 MG/1; MG/1; MG/1; MG/1
30 TABLET ORAL 3 TIMES DAILY
Qty: 90 TABLET | Refills: 0 | Status: SHIPPED | OUTPATIENT
Start: 2018-02-12 | End: 2018-03-14

## 2018-02-12 RX ORDER — DEXTROAMPHETAMINE SACCHARATE, AMPHETAMINE ASPARTATE, DEXTROAMPHETAMINE SULFATE AND AMPHETAMINE SULFATE 7.5; 7.5; 7.5; 7.5 MG/1; MG/1; MG/1; MG/1
30 TABLET ORAL DAILY
Qty: 30 TABLET | Refills: 0 | Status: SHIPPED | OUTPATIENT
Start: 2018-02-12 | End: 2018-02-12 | Stop reason: CLARIF

## 2018-02-12 RX ORDER — BUSPIRONE HYDROCHLORIDE 10 MG/1
10 TABLET ORAL DAILY
Qty: 30 TABLET | Refills: 0 | Status: SHIPPED | OUTPATIENT
Start: 2018-02-12 | End: 2018-03-12

## 2018-02-12 NOTE — TELEPHONE ENCOUNTER
From: Winsome Lim  Sent: 2/11/2018 6:54 PM CST  To: [facility] Clinical Staff  Subject: RE: Visit Follow-up Question    You are the BEST!!! Thank you!!!!    Yes. ..there was a prompt, but i never use that unless I’m in dire straights.     Thanks again

## 2018-02-12 NOTE — PATIENT INSTRUCTIONS
Buspirone tablets  Brand Name: BuSpar  What is this medicine? BUSPIRONE (marla lowery) is used to treat anxiety disorders. How should I use this medicine? Take this medicine by mouth with a glass of water.  Follow the directions on the prescription l · other medications for anxiety  · rifampin  · ritonavir  · some antifungal medicines like itraconazole, ketoconazole, and voriconazole  · verapamil  · warfarin  What if I miss a dose? If you miss a dose, take it as soon as you can.  If it is almost time f

## 2018-02-12 NOTE — TELEPHONE ENCOUNTER
From: Libra Quinn  To:  Jeanie Villagomez MD  Sent: 2/11/2018 6:13 PM CST  Subject: Prescription Question    I have been doing SO much research, and have found a medication that I wanted to ask you about:  Buspar    I have read that it is used f

## 2018-02-12 NOTE — PROGRESS NOTES
Paula Lynch is a 40year old female. Patient presents with:  Medication Follow-Up: Room 2.      HPI:   ADD: Patient is seen for follow-up and medication refill, states compliant with medication, denies any side effects, states focus has been [employer] daily. Disp: 90 tablet Rfl: 0   amphetamine-dextroamphetamine (ADDERALL) 30 MG Oral Tab Take 1 tablet (30 mg total) by mouth 3 (three) times daily. Disp: 90 tablet Rfl: 0   Famciclovir (FAMVIR) 250 MG Oral Tab Take 1 tablet (250 mg total) by mouth daily.  Valeta Rank [facility])  Encouraged patient to continue seeing her counselor.     Migraine without aura and without status migrainosus, not intractable  -     Rizatriptan Benzoate 10 MG Oral Tab; TAKE 1 TABLET BY MOUTH AS NEEDED FOR MIGRAINE( AS DIRECTED UP TO 2 TABLETS PER

## 2018-03-03 DIAGNOSIS — Z30.41 SURVEILLANCE OF CONTRACEPTIVE PILL: ICD-10-CM

## 2018-03-05 RX ORDER — DROSPIRENONE AND ETHINYL ESTRADIOL 0.03MG-3MG
KIT ORAL
Qty: 84 TABLET | Refills: 0 | Status: SHIPPED | OUTPATIENT
Start: 2018-03-05 | End: 2018-05-12 | Stop reason: ALTCHOICE

## 2018-03-05 NOTE — TELEPHONE ENCOUNTER
No future appointments.     LOV 2/18    LAST LAB 10/17    LAST RX   drospirenone-ethinyl estradiol (OCELLA) 3-0.03 MG Oral Tab 3 Package 3 10/23/2017          PROTOCOL    Gynecology Medication Protocol Failed3/3 3:40 PM   Mammogram in past 12 months     Delmi

## 2018-03-12 ENCOUNTER — OFFICE VISIT (OUTPATIENT)
Dept: FAMILY MEDICINE CLINIC | Facility: CLINIC | Age: 43
End: 2018-03-12

## 2018-03-12 VITALS
TEMPERATURE: 99 F | WEIGHT: 156.25 LBS | HEART RATE: 100 BPM | OXYGEN SATURATION: 96 % | SYSTOLIC BLOOD PRESSURE: 118 MMHG | DIASTOLIC BLOOD PRESSURE: 70 MMHG | BODY MASS INDEX: 25 KG/M2 | RESPIRATION RATE: 16 BRPM

## 2018-03-12 DIAGNOSIS — F33.1 MODERATE EPISODE OF RECURRENT MAJOR DEPRESSIVE DISORDER (HCC): ICD-10-CM

## 2018-03-12 DIAGNOSIS — R23.2 HOT FLASHES: Primary | ICD-10-CM

## 2018-03-12 PROCEDURE — 99213 OFFICE O/P EST LOW 20 MIN: CPT | Performed by: FAMILY MEDICINE

## 2018-03-12 NOTE — PATIENT INSTRUCTIONS
Please stop all your psych meds for 1 month. Continue to monitor your hot flashes and if persistent follow-up to discuss changing the dose of your oral contraceptive pill.     Please follow-up with psychiatry to discuss your medication for depression and a

## 2018-03-19 NOTE — PROGRESS NOTES
Antony Castano is a 40year old female. Patient presents with:  Medication Follow-Up: Medication refill for Buspar states she isn't doing well had headaches in the begining but feel she noticed a difference at all.     HPI:   Depression/anxiety: Pa Past Medical History:   Diagnosis Date   • ADD (attention deficit disorder)    • Allergic rhinitis    • Chronic migraine without aura, without mention of intractable migraine without mention of status migrainosus    • Contact with or exposure to tubercul

## 2018-05-03 ENCOUNTER — TELEPHONE (OUTPATIENT)
Dept: FAMILY MEDICINE CLINIC | Facility: CLINIC | Age: 43
End: 2018-05-03

## 2018-05-03 NOTE — TELEPHONE ENCOUNTER
Pt called and is concerned why insurance will not cover. She has never had a problem with the daily dose of 3 before? Can you please call the pt and let her know what she is to do?

## 2018-05-03 NOTE — TELEPHONE ENCOUNTER
Left detailed message for pt to please call her insurance to ask what has changed with her drug plan.  Based on past Rx's pt has had an increase in dosage to 3 QD since December 2017

## 2018-05-07 NOTE — TELEPHONE ENCOUNTER
Spoke with pt, her insurance states that they review Quarterly and if Dr wants to keep her on this Rx we will need to file a Prior Auth    Pt wondering if Dr Syeda Saini would consider an increased dose?     Per Dr Syeda Saini, 30 mg tab is the highest dose the m

## 2018-05-12 ENCOUNTER — OFFICE VISIT (OUTPATIENT)
Dept: FAMILY MEDICINE CLINIC | Facility: CLINIC | Age: 43
End: 2018-05-12

## 2018-05-12 VITALS
SYSTOLIC BLOOD PRESSURE: 112 MMHG | HEART RATE: 78 BPM | RESPIRATION RATE: 16 BRPM | TEMPERATURE: 99 F | BODY MASS INDEX: 24 KG/M2 | WEIGHT: 152.25 LBS | OXYGEN SATURATION: 98 % | DIASTOLIC BLOOD PRESSURE: 72 MMHG

## 2018-05-12 DIAGNOSIS — F98.8 ATTENTION DEFICIT DISORDER (ADD) WITHOUT HYPERACTIVITY: ICD-10-CM

## 2018-05-12 DIAGNOSIS — Z51.81 ENCOUNTER FOR THERAPEUTIC DRUG MONITORING: Primary | ICD-10-CM

## 2018-05-12 PROCEDURE — 99213 OFFICE O/P EST LOW 20 MIN: CPT | Performed by: FAMILY MEDICINE

## 2018-05-12 RX ORDER — DEXTROAMPHETAMINE SACCHARATE, AMPHETAMINE ASPARTATE, DEXTROAMPHETAMINE SULFATE AND AMPHETAMINE SULFATE 7.5; 7.5; 7.5; 7.5 MG/1; MG/1; MG/1; MG/1
30 TABLET ORAL 3 TIMES DAILY
Qty: 90 TABLET | Refills: 0 | Status: SHIPPED | OUTPATIENT
Start: 2018-06-12 | End: 2018-07-12 | Stop reason: WASHOUT

## 2018-05-12 RX ORDER — DEXTROAMPHETAMINE SACCHARATE, AMPHETAMINE ASPARTATE, DEXTROAMPHETAMINE SULFATE AND AMPHETAMINE SULFATE 7.5; 7.5; 7.5; 7.5 MG/1; MG/1; MG/1; MG/1
30 TABLET ORAL 3 TIMES DAILY
Qty: 90 TABLET | Refills: 0 | Status: SHIPPED | OUTPATIENT
Start: 2018-08-12 | End: 2018-09-11 | Stop reason: WASHOUT

## 2018-05-12 RX ORDER — DEXTROAMPHETAMINE SACCHARATE, AMPHETAMINE ASPARTATE, DEXTROAMPHETAMINE SULFATE AND AMPHETAMINE SULFATE 7.5; 7.5; 7.5; 7.5 MG/1; MG/1; MG/1; MG/1
30 TABLET ORAL 3 TIMES DAILY
Qty: 90 TABLET | Refills: 0 | Status: SHIPPED | OUTPATIENT
Start: 2018-07-12 | End: 2018-08-11 | Stop reason: WASHOUT

## 2018-05-12 NOTE — PROGRESS NOTES
Emilia Mendez is a 44year old female. Patient presents with: Follow - Up: One month f/u after stopping medication.     HPI:   ADD: Patient is seen for follow-up and medication refill, states compliant with medication, patient states insurance on BY MOUTH EVERY 12 HOURS Disp: 20 tablet Rfl: 0   amphetamine-dextroamphetamine (ADDERALL) 30 MG Oral Tab Take 1 tablet (30 mg total) by mouth 3 (three) times daily.  Disp: 90 tablet Rfl: 0   amphetamine-dextroamphetamine (ADDERALL) 30 MG Oral Tab Take 1 tab

## 2018-05-14 NOTE — TELEPHONE ENCOUNTER
F/U from Pt appt  Attention deficit disorder (ADD) without hyperactivity  -     amphetamine-dextroamphetamine (ADDERALL) 30 MG Oral Tab; Take 1 tablet (30 mg total) by mouth 3 (three) times daily.   -     amphetamine-dextroamphetamine (ADDERALL) 30 MG Oral

## 2018-05-21 ENCOUNTER — TELEPHONE (OUTPATIENT)
Dept: FAMILY MEDICINE CLINIC | Facility: CLINIC | Age: 43
End: 2018-05-21

## 2018-05-21 NOTE — TELEPHONE ENCOUNTER
**LVM at front office**    Would like to speak with Nurse who is working on current [address]. Pt has spoken to her INS company had has some information that she'd like to pass along. Pls call pt back.

## 2018-05-25 ENCOUNTER — PATIENT MESSAGE (OUTPATIENT)
Dept: FAMILY MEDICINE CLINIC | Facility: CLINIC | Age: 43
End: 2018-05-25

## 2018-05-29 NOTE — TELEPHONE ENCOUNTER
From: Jacque Daniel  To: Melissa Rutledge MD  Sent: 5/25/2018 3:26 PM CDT  Subject: Other    Lyell Nicks! I got your message that my RX has been approved! I tried calling a bit ago but the office is already closed.  I hope you get this message bc I don’

## 2018-08-16 DIAGNOSIS — G43.009 MIGRAINE WITHOUT AURA AND WITHOUT STATUS MIGRAINOSUS, NOT INTRACTABLE: ICD-10-CM

## 2018-08-20 RX ORDER — RIZATRIPTAN BENZOATE 10 MG/1
TABLET ORAL
Qty: 12 TABLET | Refills: 0 | Status: SHIPPED | OUTPATIENT
Start: 2018-08-20 | End: 2018-08-24

## 2018-08-20 NOTE — TELEPHONE ENCOUNTER
LOV 5/18    LAST LAB    LAST RX               Next OV Visit date not found    PROTOCOL  Please advise. No protocol.

## 2018-08-21 DIAGNOSIS — F98.8 ATTENTION DEFICIT DISORDER (ADD) WITHOUT HYPERACTIVITY: ICD-10-CM

## 2018-08-22 ENCOUNTER — TELEPHONE (OUTPATIENT)
Dept: FAMILY MEDICINE CLINIC | Facility: CLINIC | Age: 43
End: 2018-08-22

## 2018-08-22 ENCOUNTER — PATIENT MESSAGE (OUTPATIENT)
Dept: FAMILY MEDICINE CLINIC | Facility: CLINIC | Age: 43
End: 2018-08-22

## 2018-08-22 RX ORDER — DEXTROAMPHETAMINE SACCHARATE, AMPHETAMINE ASPARTATE, DEXTROAMPHETAMINE SULFATE AND AMPHETAMINE SULFATE 7.5; 7.5; 7.5; 7.5 MG/1; MG/1; MG/1; MG/1
30 TABLET ORAL 3 TIMES DAILY
Qty: 90 TABLET | Refills: 0
Start: 2018-08-22 | End: 2018-09-21

## 2018-08-22 NOTE — TELEPHONE ENCOUNTER
Future Appointments  Date Time Provider Julian Amaya   8/23/2018 10:15 AM Andrew Bey MD [facility]

## 2018-08-22 NOTE — TELEPHONE ENCOUNTER
Pt called stating this is the 2nd month in a row that she missed the window with the pharmacy for her 90 day refill for the Adderall refill. Can the Dr refill please?

## 2018-08-22 NOTE — TELEPHONE ENCOUNTER
From: Young Olson  To: Jordan Quinones MD  Sent: 8/22/2018 5:29 PM CDT  Subject: Other    Hi. ...unfortunately I am not available tomorrow at the time your office assigned an appointment to me.   I was in the area today and was hoping to pick u

## 2018-08-22 NOTE — TELEPHONE ENCOUNTER
From: Marilee Hernandez  Sent: 8/21/2018 7:10 PM CDT  Subject: Medication Renewal Request    Jesus Callahan.  Frank De León would like a refill of the following medications:     amphetamine-dextroamphetamine (ADDERALL) 30 MG Oral Tab [TONI Townsend MD]   Courtney Vazquez

## 2018-08-22 NOTE — TELEPHONE ENCOUNTER
Left detailed message for patient on phone. Dr Gutierrez Button wants to see patient tomorrow put in at 10:15A.

## 2018-08-23 ENCOUNTER — PATIENT MESSAGE (OUTPATIENT)
Dept: FAMILY MEDICINE CLINIC | Facility: CLINIC | Age: 43
End: 2018-08-23

## 2018-08-23 NOTE — TELEPHONE ENCOUNTER
From: Young Olson  To: Jordan Quinones MD  Sent: 8/23/2018 4:56 PM CDT  Subject: Visit Follow-up Question    Once again I just tried calling at 4:49 and your phones were turned off!   I also tried callthing this morning right after 8am - but t

## 2018-08-24 ENCOUNTER — OFFICE VISIT (OUTPATIENT)
Dept: FAMILY MEDICINE CLINIC | Facility: CLINIC | Age: 43
End: 2018-08-24
Payer: COMMERCIAL

## 2018-08-24 VITALS
TEMPERATURE: 98 F | DIASTOLIC BLOOD PRESSURE: 64 MMHG | HEART RATE: 98 BPM | BODY MASS INDEX: 23.34 KG/M2 | OXYGEN SATURATION: 98 % | HEIGHT: 68 IN | WEIGHT: 154 LBS | RESPIRATION RATE: 18 BRPM | SYSTOLIC BLOOD PRESSURE: 123 MMHG

## 2018-08-24 DIAGNOSIS — Z79.899 ENCOUNTER FOR LONG-TERM (CURRENT) USE OF MEDICATIONS: Primary | ICD-10-CM

## 2018-08-24 DIAGNOSIS — E03.9 HYPOTHYROIDISM, UNSPECIFIED TYPE: ICD-10-CM

## 2018-08-24 DIAGNOSIS — F98.8 ATTENTION DEFICIT DISORDER (ADD) WITHOUT HYPERACTIVITY: ICD-10-CM

## 2018-08-24 PROCEDURE — 99213 OFFICE O/P EST LOW 20 MIN: CPT | Performed by: FAMILY MEDICINE

## 2018-08-24 RX ORDER — DEXTROAMPHETAMINE SACCHARATE, AMPHETAMINE ASPARTATE, DEXTROAMPHETAMINE SULFATE AND AMPHETAMINE SULFATE 7.5; 7.5; 7.5; 7.5 MG/1; MG/1; MG/1; MG/1
30 TABLET ORAL 3 TIMES DAILY
Qty: 90 TABLET | Refills: 0 | Status: CANCELLED | OUTPATIENT
Start: 2018-08-24 | End: 2018-09-23

## 2018-08-24 RX ORDER — DROSPIRENONE AND ETHINYL ESTRADIOL 0.02-3(28)
1 KIT ORAL DAILY
COMMUNITY
End: 2019-12-02

## 2018-08-24 RX ORDER — DEXTROAMPHETAMINE SACCHARATE, AMPHETAMINE ASPARTATE, DEXTROAMPHETAMINE SULFATE AND AMPHETAMINE SULFATE 7.5; 7.5; 7.5; 7.5 MG/1; MG/1; MG/1; MG/1
30 TABLET ORAL 3 TIMES DAILY
Qty: 90 TABLET | Refills: 0 | Status: SHIPPED | OUTPATIENT
Start: 2018-09-23 | End: 2018-10-23 | Stop reason: WASHOUT

## 2018-08-24 RX ORDER — DEXTROAMPHETAMINE SACCHARATE, AMPHETAMINE ASPARTATE, DEXTROAMPHETAMINE SULFATE AND AMPHETAMINE SULFATE 7.5; 7.5; 7.5; 7.5 MG/1; MG/1; MG/1; MG/1
30 TABLET ORAL 3 TIMES DAILY
Qty: 90 TABLET | Refills: 0 | Status: SHIPPED | OUTPATIENT
Start: 2018-08-24 | End: 2018-09-23 | Stop reason: WASHOUT

## 2018-08-24 RX ORDER — DEXTROAMPHETAMINE SACCHARATE, AMPHETAMINE ASPARTATE, DEXTROAMPHETAMINE SULFATE AND AMPHETAMINE SULFATE 7.5; 7.5; 7.5; 7.5 MG/1; MG/1; MG/1; MG/1
30 TABLET ORAL 3 TIMES DAILY
Qty: 30 TABLET | Refills: 0 | Status: SHIPPED | OUTPATIENT
Start: 2018-10-30 | End: 2018-11-29 | Stop reason: WASHOUT

## 2018-08-24 RX ORDER — LEVOTHYROXINE SODIUM 0.03 MG/1
25 TABLET ORAL DAILY
Qty: 90 TABLET | Refills: 3 | Status: SHIPPED | OUTPATIENT
Start: 2018-08-24 | End: 2019-09-10

## 2018-09-10 NOTE — PROGRESS NOTES
Levy Hernadnez is a 44year old female. Patient presents with: Follow - Up: Three month follow for Adderall.     HPI:   ADD: patient is seen for follow up and medication refill, states her last script was not filled by the pharmacist, states was t Use      Smoking status: Never Smoker      Smokeless tobacco: Never Used    Alcohol use: Yes      Comment: social    Drug use: No       REVIEW OF SYSTEMS:   GENERAL HEALTH: feels well otherwise  GI: no constipation  NEURO: denies headaches  PSYCH: denies a

## 2018-09-20 DIAGNOSIS — G43.009 MIGRAINE WITHOUT AURA AND WITHOUT STATUS MIGRAINOSUS, NOT INTRACTABLE: ICD-10-CM

## 2018-09-21 NOTE — TELEPHONE ENCOUNTER
LOV 8/24/18    LAST LAB    LAST RX 2/12/18 #12 x 3 re    Next OV No future appointments.       PROTOCOL none  Name from pharmacy: RIZATRIPTAN 10MG TABLETS          Will file in chart as: RIZATRIPTAN BENZOATE 10 MG Oral Tab    The source prescription was dis

## 2018-09-22 ENCOUNTER — PATIENT MESSAGE (OUTPATIENT)
Dept: FAMILY MEDICINE CLINIC | Facility: CLINIC | Age: 43
End: 2018-09-22

## 2018-09-22 DIAGNOSIS — G43.009 MIGRAINE WITHOUT AURA AND WITHOUT STATUS MIGRAINOSUS, NOT INTRACTABLE: ICD-10-CM

## 2018-09-22 RX ORDER — RIZATRIPTAN BENZOATE 10 MG/1
TABLET ORAL
Qty: 12 TABLET | Refills: 0 | Status: SHIPPED | OUTPATIENT
Start: 2018-09-22 | End: 2018-10-25

## 2018-09-24 NOTE — TELEPHONE ENCOUNTER
From: Wyatt Quiñonez  To: Hannah Rob MD  Sent: 9/22/2018 9:00 AM CDT  Subject: Prescription Question    Hi there,   Can you please refill my migraine medication and allow refills on it? I was just in to see the doctor last month.     Thanks

## 2018-09-25 RX ORDER — RIZATRIPTAN BENZOATE 10 MG/1
TABLET ORAL
Qty: 12 TABLET | Refills: 3 | OUTPATIENT
Start: 2018-09-25

## 2018-09-25 NOTE — TELEPHONE ENCOUNTER
LOV  8/24/18    LAST LAB 12/4/17    LAST RX 9/22/18   #12  No refill    Next OV No future appointments.     PROTOCOL  Rizatriptan Benzoate 10 MG Oral Tab  DENIED AS DUPLICATE, INSTRUCTIONS TO PHARMACY TO CHECK FOR NEW/ REFILLS

## 2018-10-25 DIAGNOSIS — G43.009 MIGRAINE WITHOUT AURA AND WITHOUT STATUS MIGRAINOSUS, NOT INTRACTABLE: ICD-10-CM

## 2018-10-26 DIAGNOSIS — B00.1 RECURRENT COLD SORES: ICD-10-CM

## 2018-10-26 RX ORDER — RIZATRIPTAN BENZOATE 10 MG/1
TABLET ORAL
Qty: 12 TABLET | Refills: 2 | Status: SHIPPED | OUTPATIENT
Start: 2018-10-26 | End: 2019-01-20

## 2018-10-26 NOTE — TELEPHONE ENCOUNTER
LOV 8/18    LAST LAB    LAST RX   RIZATRIPTAN BENZOATE 10 MG Oral Tab 12 tablet 0 9/22/2018         Next OV Visit date not found    PROTOCOL  Please advise. No protocol.    Thank You

## 2018-10-29 RX ORDER — FAMCICLOVIR 250 MG/1
TABLET, FILM COATED ORAL
Qty: 15 TABLET | Refills: 0 | OUTPATIENT
Start: 2018-10-29

## 2018-10-29 NOTE — TELEPHONE ENCOUNTER
Name from pharmacy: FAMCICLOVIR 250MG TABLETS          Will file in chart as: FAMCICLOVIR 250 MG Oral Tab    Sig: TAKE 1 TABLET(250 MG) BY MOUTH DAILY    Disp:  90 tablet    Refills:  0    Start: 10/26/2018     Class: Normal    For: Recurrent cold sores

## 2019-01-10 ENCOUNTER — PATIENT MESSAGE (OUTPATIENT)
Dept: FAMILY MEDICINE CLINIC | Facility: CLINIC | Age: 44
End: 2019-01-10

## 2019-01-11 NOTE — TELEPHONE ENCOUNTER
From: Antony Likes  To: Andrew Bey MD  Sent: 1/10/2019 5:09 PM CST  Subject: Prescription Question    Hi there,   Thank you SO SO much for being willing to squeeze me in the other day!  I really appreciated it, and am sorry I didn’t make i

## 2019-01-11 NOTE — TELEPHONE ENCOUNTER
Future Appointments   Date Time Provider Julian Amaya   1/14/2019  9:30 AM Ling Chisholm MD [facility]     Patient scheduled for medication F/U appt.

## 2019-01-14 ENCOUNTER — OFFICE VISIT (OUTPATIENT)
Dept: FAMILY MEDICINE CLINIC | Facility: CLINIC | Age: 44
End: 2019-01-14
Payer: COMMERCIAL

## 2019-01-14 VITALS
BODY MASS INDEX: 24.49 KG/M2 | WEIGHT: 152.38 LBS | DIASTOLIC BLOOD PRESSURE: 82 MMHG | TEMPERATURE: 97 F | HEIGHT: 66 IN | RESPIRATION RATE: 18 BRPM | OXYGEN SATURATION: 98 % | SYSTOLIC BLOOD PRESSURE: 124 MMHG | HEART RATE: 102 BPM

## 2019-01-14 DIAGNOSIS — Z51.81 ENCOUNTER FOR THERAPEUTIC DRUG MONITORING: Primary | ICD-10-CM

## 2019-01-14 DIAGNOSIS — B00.1 RECURRENT COLD SORES: ICD-10-CM

## 2019-01-14 DIAGNOSIS — Z12.39 SCREENING FOR BREAST CANCER: ICD-10-CM

## 2019-01-14 DIAGNOSIS — F98.8 ATTENTION DEFICIT DISORDER (ADD) WITHOUT HYPERACTIVITY: ICD-10-CM

## 2019-01-14 PROCEDURE — 99213 OFFICE O/P EST LOW 20 MIN: CPT | Performed by: FAMILY MEDICINE

## 2019-01-14 RX ORDER — FAMCICLOVIR 250 MG/1
250 TABLET, FILM COATED ORAL DAILY
Qty: 90 TABLET | Refills: 3 | Status: SHIPPED | OUTPATIENT
Start: 2019-01-14 | End: 2019-08-26

## 2019-01-14 RX ORDER — DEXTROAMPHETAMINE SACCHARATE, AMPHETAMINE ASPARTATE, DEXTROAMPHETAMINE SULFATE AND AMPHETAMINE SULFATE 7.5; 7.5; 7.5; 7.5 MG/1; MG/1; MG/1; MG/1
30 TABLET ORAL 3 TIMES DAILY
Qty: 90 TABLET | Refills: 0 | Status: SHIPPED | OUTPATIENT
Start: 2019-03-16 | End: 2019-04-15 | Stop reason: WASHOUT

## 2019-01-14 RX ORDER — DEXTROAMPHETAMINE SACCHARATE, AMPHETAMINE ASPARTATE, DEXTROAMPHETAMINE SULFATE AND AMPHETAMINE SULFATE 7.5; 7.5; 7.5; 7.5 MG/1; MG/1; MG/1; MG/1
30 TABLET ORAL 3 TIMES DAILY
Qty: 30 TABLET | Refills: 0 | Status: CANCELLED | OUTPATIENT
Start: 2019-01-14 | End: 2019-02-13

## 2019-01-14 RX ORDER — DEXTROAMPHETAMINE SACCHARATE, AMPHETAMINE ASPARTATE, DEXTROAMPHETAMINE SULFATE AND AMPHETAMINE SULFATE 7.5; 7.5; 7.5; 7.5 MG/1; MG/1; MG/1; MG/1
30 TABLET ORAL 3 TIMES DAILY
Qty: 90 TABLET | Refills: 0 | Status: SHIPPED | OUTPATIENT
Start: 2019-01-14 | End: 2019-02-13 | Stop reason: WASHOUT

## 2019-01-14 RX ORDER — DEXTROAMPHETAMINE SACCHARATE, AMPHETAMINE ASPARTATE, DEXTROAMPHETAMINE SULFATE AND AMPHETAMINE SULFATE 7.5; 7.5; 7.5; 7.5 MG/1; MG/1; MG/1; MG/1
30 TABLET ORAL 3 TIMES DAILY
Qty: 90 TABLET | Refills: 0 | Status: SHIPPED | OUTPATIENT
Start: 2019-02-13 | End: 2019-03-15 | Stop reason: WASHOUT

## 2019-01-14 NOTE — PROGRESS NOTES
Jose Rosaline is a 44year old female.   Patient presents with:  Medication Follow-Up: Refill for adderall and Famvir    HPI:   ADD: patient is seen for follow up and medication refill,   States the current dose is working well for her and focus at SYSTEMS:   GENERAL HEALTH: feels well otherwise  GI: no constipation  NEURO: denies headaches  PSYCH: denies anhedonia    EXAM:   /82   Pulse 102   Temp 97.2 °F (36.2 °C) (Temporal)   Resp 18   Ht 66\"   Wt 152 lb 6 oz   SpO2 98%   BMI 24.59 kg/m²

## 2019-01-15 ENCOUNTER — PATIENT MESSAGE (OUTPATIENT)
Dept: FAMILY MEDICINE CLINIC | Facility: CLINIC | Age: 44
End: 2019-01-15

## 2019-01-20 DIAGNOSIS — G43.009 MIGRAINE WITHOUT AURA AND WITHOUT STATUS MIGRAINOSUS, NOT INTRACTABLE: ICD-10-CM

## 2019-01-22 RX ORDER — RIZATRIPTAN BENZOATE 10 MG/1
TABLET ORAL
Qty: 12 TABLET | Refills: 0 | Status: SHIPPED | OUTPATIENT
Start: 2019-01-22 | End: 2019-03-01

## 2019-01-22 NOTE — TELEPHONE ENCOUNTER
LOV 1/19   Return in about 3 months (around 4/14/2019). LAST LAB 12/17    LAST RX   RIZATRIPTAN BENZOATE 10 MG Oral Tab 12 tablet 2 10/26/2018         Next OV    PROTOCOL  Please advise. No protocol. If filled. Please close.    Thank You

## 2019-03-01 DIAGNOSIS — G43.009 MIGRAINE WITHOUT AURA AND WITHOUT STATUS MIGRAINOSUS, NOT INTRACTABLE: ICD-10-CM

## 2019-03-01 RX ORDER — RIZATRIPTAN BENZOATE 10 MG/1
TABLET ORAL
Qty: 12 TABLET | Refills: 0 | Status: SHIPPED | OUTPATIENT
Start: 2019-03-01 | End: 2019-04-06

## 2019-03-01 NOTE — TELEPHONE ENCOUNTER
LOV 1/19 Return in about 3 months (around 4/14/2019).      LAST LAB 12/17    LAST RX   RIZATRIPTAN BENZOATE 10 MG Oral Tab 12 tablet 0 1/22/2019         Next OV 4/15/2019      PROTOCOL  Please advise. No protocol. Thank You

## 2019-04-06 DIAGNOSIS — G43.009 MIGRAINE WITHOUT AURA AND WITHOUT STATUS MIGRAINOSUS, NOT INTRACTABLE: ICD-10-CM

## 2019-04-08 RX ORDER — RIZATRIPTAN BENZOATE 10 MG/1
10 TABLET ORAL DAILY PRN
Qty: 12 TABLET | Refills: 0 | Status: SHIPPED | OUTPATIENT
Start: 2019-04-08 | End: 2019-04-15

## 2019-04-08 NOTE — TELEPHONE ENCOUNTER
LOV 1/14/19    LAST LAB 12/4/18    LAST RX 3/1/19    Next OV   Future Appointments   Date Time Provider Julian Amaya   4/15/2019  8:00 AM Igor Saeed MD [facility]         PROTOCOL  None    Pt asking for additional refills added (advise

## 2019-04-15 NOTE — PROGRESS NOTES
Antony Castano is a 38year old female.   Patient presents with:  Medication Follow-Up: Medication refill for adderall    HPI:   ADD: Patient is seen for follow-up and medication refill, states compliant with medication, denies any side effects, sta 1 tablet (30 mg total) by mouth 3 (three) times daily. Disp: 90 tablet Rfl: 0   amphetamine-dextroamphetamine (ADDERALL) 30 MG Oral Tab Take 1 tablet (30 mg total) by mouth 3 (three) times daily.  Disp: 90 tablet Rfl: 0   Famciclovir (FAMVIR) 250 MG Oral Ta INTERNAL

## 2019-04-15 NOTE — PATIENT INSTRUCTIONS
Preventing Migraine Headaches: Triggers  The first step in preventing migraines is to learn what triggers them. You may then be able to control your triggers to avoid or reduce the severity of your migraines.   Know your triggers  Be aware that you may ha © 2490-8902 [employer] [address]. [address] [facility] [address]. All rights reserved. This information is not intended as a substitute for professional medical care. Always follow your healthcare professional's instructions.

## 2019-05-20 DIAGNOSIS — F98.8 ATTENTION DEFICIT DISORDER (ADD) WITHOUT HYPERACTIVITY: ICD-10-CM

## 2019-05-20 RX ORDER — DEXTROAMPHETAMINE SACCHARATE, AMPHETAMINE ASPARTATE, DEXTROAMPHETAMINE SULFATE AND AMPHETAMINE SULFATE 7.5; 7.5; 7.5; 7.5 MG/1; MG/1; MG/1; MG/1
30 TABLET ORAL 3 TIMES DAILY
Qty: 90 TABLET | Refills: 0 | Status: CANCELLED | OUTPATIENT
Start: 2019-05-20 | End: 2019-06-19

## 2019-05-23 DIAGNOSIS — G43.009 MIGRAINE WITHOUT AURA AND WITHOUT STATUS MIGRAINOSUS, NOT INTRACTABLE: ICD-10-CM

## 2019-05-24 RX ORDER — RIZATRIPTAN BENZOATE 10 MG/1
TABLET ORAL
Qty: 12 TABLET | Refills: 0 | OUTPATIENT
Start: 2019-05-24

## 2019-05-24 NOTE — TELEPHONE ENCOUNTER
LOV    LAST LAB    LAST RX   Rizatriptan Benzoate 10 MG Oral Tab 12 tablet 5 4/15/2019         Next OV    PROTOCOL  Denied already done.

## 2019-05-25 DIAGNOSIS — G43.009 MIGRAINE WITHOUT AURA AND WITHOUT STATUS MIGRAINOSUS, NOT INTRACTABLE: ICD-10-CM

## 2019-05-27 RX ORDER — RIZATRIPTAN BENZOATE 10 MG/1
10 TABLET ORAL DAILY PRN
Qty: 12 TABLET | Refills: 5 | OUTPATIENT
Start: 2019-05-27

## 2019-05-28 NOTE — TELEPHONE ENCOUNTER
LOV    LAST LAB    LAST RX 4-15-19 #12x 5 refills  DENIED AS DUPLICATE, INSTRUCTIONS TO PHARMACY TO CHECK FOR NEW/ REFILLS      Next OV   Future Appointments   Date Time Provider Julian Amaya   7/15/2019  8:30 AM Alice Ruff MD [facility]

## 2019-05-29 DIAGNOSIS — G43.009 MIGRAINE WITHOUT AURA AND WITHOUT STATUS MIGRAINOSUS, NOT INTRACTABLE: ICD-10-CM

## 2019-05-29 RX ORDER — RIZATRIPTAN BENZOATE 10 MG/1
10 TABLET ORAL DAILY PRN
Qty: 12 TABLET | Refills: 5 | OUTPATIENT
Start: 2019-05-29

## 2019-05-29 NOTE — TELEPHONE ENCOUNTER
Rizatriptan Benzoate 10 MG Oral Tab         Sig: Take 1 tablet (10 mg total) by mouth daily as needed for Migraine.     Disp:  12 tablet    Refills:  5    Start: 5/29/2019    Class: Normal    Non-formulary For: Migraine without aura and without status mi

## 2019-06-01 ENCOUNTER — PATIENT MESSAGE (OUTPATIENT)
Dept: FAMILY MEDICINE CLINIC | Facility: CLINIC | Age: 44
End: 2019-06-01

## 2019-06-03 NOTE — TELEPHONE ENCOUNTER
From: Rafael [facility]  To: Nini David MD  Sent: 6/1/2019 1:56 PM CDT  Subject: Prescription Question    Hi Dr Adriana Vieira - my migraine medicine keeps getting denied by your office.  At my visit on 4.15, I thought you said you were going to renew it an

## 2019-06-21 ENCOUNTER — PATIENT MESSAGE (OUTPATIENT)
Dept: FAMILY MEDICINE CLINIC | Facility: CLINIC | Age: 44
End: 2019-06-21

## 2019-06-21 DIAGNOSIS — G43.009 MIGRAINE WITHOUT AURA AND WITHOUT STATUS MIGRAINOSUS, NOT INTRACTABLE: ICD-10-CM

## 2019-06-21 RX ORDER — RIZATRIPTAN BENZOATE 10 MG/1
10 TABLET ORAL DAILY PRN
Qty: 12 TABLET | Refills: 5 | Status: CANCELLED | OUTPATIENT
Start: 2019-06-21

## 2019-06-21 NOTE — TELEPHONE ENCOUNTER
Pt called re: Rx for Rizatriptan said she just left pharmacy and they told her there is no refills on this Rx, per pt they state they did not receive the Rx with refills that was sent on 4/15/19?

## 2019-06-24 RX ORDER — RIZATRIPTAN BENZOATE 10 MG/1
10 TABLET ORAL DAILY PRN
Qty: 12 TABLET | Refills: 5 | Status: SHIPPED | OUTPATIENT
Start: 2019-06-24 | End: 2019-08-26

## 2019-06-24 NOTE — TELEPHONE ENCOUNTER
From: Wilmar Montaño  To: Ashlee Huff MD  Sent: 6/21/2019 6:55 PM CDT  Subject: Prescription Question    ATTENTION NEEDED:  PA for Adderall (1 mo overdue)  Rx for Riza (2mo overdue)  ———  1 mo ago my Adderall needed a PA.  I spoke with tonya hawk

## 2019-07-10 ENCOUNTER — MED REC SCAN ONLY (OUTPATIENT)
Dept: FAMILY MEDICINE CLINIC | Facility: CLINIC | Age: 44
End: 2019-07-10

## 2019-07-23 ENCOUNTER — OFFICE VISIT (OUTPATIENT)
Dept: FAMILY MEDICINE CLINIC | Facility: CLINIC | Age: 44
End: 2019-07-23
Payer: COMMERCIAL

## 2019-07-23 VITALS
SYSTOLIC BLOOD PRESSURE: 110 MMHG | BODY MASS INDEX: 24.52 KG/M2 | TEMPERATURE: 98 F | WEIGHT: 154.38 LBS | HEART RATE: 85 BPM | RESPIRATION RATE: 16 BRPM | DIASTOLIC BLOOD PRESSURE: 60 MMHG | HEIGHT: 66.5 IN | OXYGEN SATURATION: 99 %

## 2019-07-23 DIAGNOSIS — E03.9 HYPOTHYROIDISM, UNSPECIFIED TYPE: ICD-10-CM

## 2019-07-23 DIAGNOSIS — F98.8 ATTENTION DEFICIT DISORDER (ADD) WITHOUT HYPERACTIVITY: ICD-10-CM

## 2019-07-23 DIAGNOSIS — Z51.81 ENCOUNTER FOR THERAPEUTIC DRUG MONITORING: Primary | ICD-10-CM

## 2019-07-23 PROCEDURE — 99213 OFFICE O/P EST LOW 20 MIN: CPT | Performed by: FAMILY MEDICINE

## 2019-07-23 RX ORDER — DEXTROAMPHETAMINE SACCHARATE, AMPHETAMINE ASPARTATE, DEXTROAMPHETAMINE SULFATE AND AMPHETAMINE SULFATE 7.5; 7.5; 7.5; 7.5 MG/1; MG/1; MG/1; MG/1
30 TABLET ORAL 3 TIMES DAILY
Qty: 90 TABLET | Refills: 0 | Status: CANCELLED | OUTPATIENT
Start: 2019-07-23

## 2019-07-23 RX ORDER — DEXTROAMPHETAMINE SACCHARATE, AMPHETAMINE ASPARTATE, DEXTROAMPHETAMINE SULFATE AND AMPHETAMINE SULFATE 7.5; 7.5; 7.5; 7.5 MG/1; MG/1; MG/1; MG/1
30 TABLET ORAL 3 TIMES DAILY
Qty: 90 TABLET | Refills: 0 | Status: SHIPPED | OUTPATIENT
Start: 2019-07-23 | End: 2019-08-26 | Stop reason: DRUGHIGH

## 2019-07-23 NOTE — PROGRESS NOTES
Javier Kruger is a 38year old female. Patient presents with:  Medication Follow-Up: Refill for Adderall XR.     HPI:   ADD:Patient is seen for follow up, wants to discuss her adderall dosage, states feels the current dose is not working as well a Drug use: No       REVIEW OF SYSTEMS:   GENERAL HEALTH: feels well otherwise  RESPIRATORY: denies shortness of breath with exertion  CARDIOVASCULAR: denies chest pain on exertion  NEURO: denies headaches  PSYCH: depression and anxiety well controlled with

## 2019-08-19 ENCOUNTER — TELEPHONE (OUTPATIENT)
Dept: FAMILY MEDICINE CLINIC | Facility: CLINIC | Age: 44
End: 2019-08-19

## 2019-08-19 NOTE — TELEPHONE ENCOUNTER
Left message for patient regarding her appointment she has on the 21st she said she is unable to make that and needs to r/s .

## 2019-08-21 ENCOUNTER — TELEPHONE (OUTPATIENT)
Dept: FAMILY MEDICINE CLINIC | Facility: CLINIC | Age: 44
End: 2019-08-21

## 2019-08-21 NOTE — TELEPHONE ENCOUNTER
Left message for patient regarding her no show appointment , asked her to call back and reschedule .     Charge per dr Amirah Fisher

## 2019-08-25 ENCOUNTER — PATIENT MESSAGE (OUTPATIENT)
Dept: FAMILY MEDICINE CLINIC | Facility: CLINIC | Age: 44
End: 2019-08-25

## 2019-08-25 NOTE — TELEPHONE ENCOUNTER
From: Wyatt Quiñonez  To:  Hannah Rob MD  Sent: 8/25/2019 4:19 PM CDT  Subject: Visit Follow-up Question    Hi, I had to cancel my appt last week and am just wondering if I can help you fill any canceled slots you may have for tomorrow? :)

## 2019-08-26 ENCOUNTER — OFFICE VISIT (OUTPATIENT)
Dept: FAMILY MEDICINE CLINIC | Facility: CLINIC | Age: 44
End: 2019-08-26
Payer: COMMERCIAL

## 2019-08-26 VITALS
HEART RATE: 90 BPM | HEIGHT: 66.5 IN | OXYGEN SATURATION: 98 % | SYSTOLIC BLOOD PRESSURE: 104 MMHG | TEMPERATURE: 99 F | BODY MASS INDEX: 24.3 KG/M2 | RESPIRATION RATE: 18 BRPM | WEIGHT: 153 LBS | DIASTOLIC BLOOD PRESSURE: 72 MMHG

## 2019-08-26 DIAGNOSIS — Z12.39 SCREENING FOR BREAST CANCER: ICD-10-CM

## 2019-08-26 DIAGNOSIS — B00.1 RECURRENT COLD SORES: ICD-10-CM

## 2019-08-26 DIAGNOSIS — L65.8 FEMALE PATTERN HAIR LOSS: ICD-10-CM

## 2019-08-26 DIAGNOSIS — F98.8 ATTENTION DEFICIT DISORDER (ADD) WITHOUT HYPERACTIVITY: ICD-10-CM

## 2019-08-26 DIAGNOSIS — Z00.00 ROUTINE GENERAL MEDICAL EXAMINATION AT A HEALTH CARE FACILITY: Primary | ICD-10-CM

## 2019-08-26 DIAGNOSIS — G43.009 MIGRAINE WITHOUT AURA AND WITHOUT STATUS MIGRAINOSUS, NOT INTRACTABLE: ICD-10-CM

## 2019-08-26 DIAGNOSIS — Z30.41 SURVEILLANCE FOR BIRTH CONTROL, ORAL CONTRACEPTIVES: ICD-10-CM

## 2019-08-26 DIAGNOSIS — E03.9 HYPOTHYROIDISM, UNSPECIFIED TYPE: ICD-10-CM

## 2019-08-26 PROCEDURE — 99396 PREV VISIT EST AGE 40-64: CPT | Performed by: FAMILY MEDICINE

## 2019-08-26 PROCEDURE — 99214 OFFICE O/P EST MOD 30 MIN: CPT | Performed by: FAMILY MEDICINE

## 2019-08-26 RX ORDER — DEXTROAMPHETAMINE SACCHARATE, AMPHETAMINE ASPARTATE, DEXTROAMPHETAMINE SULFATE AND AMPHETAMINE SULFATE 5; 5; 5; 5 MG/1; MG/1; MG/1; MG/1
20 TABLET ORAL DAILY
Qty: 30 TABLET | Refills: 0 | Status: SHIPPED | OUTPATIENT
Start: 2019-08-26 | End: 2019-09-03 | Stop reason: DRUGHIGH

## 2019-08-26 RX ORDER — DEXTROAMPHETAMINE SACCHARATE, AMPHETAMINE ASPARTATE MONOHYDRATE, DEXTROAMPHETAMINE SULFATE AND AMPHETAMINE SULFATE 7.5; 7.5; 7.5; 7.5 MG/1; MG/1; MG/1; MG/1
30 CAPSULE, EXTENDED RELEASE ORAL EVERY MORNING
Qty: 30 CAPSULE | Refills: 0 | Status: SHIPPED | OUTPATIENT
Start: 2019-08-26 | End: 2019-12-02 | Stop reason: ALTCHOICE

## 2019-08-26 RX ORDER — FAMCICLOVIR 250 MG/1
250 TABLET, FILM COATED ORAL DAILY
Qty: 90 TABLET | Refills: 3 | Status: SHIPPED | OUTPATIENT
Start: 2019-08-26 | End: 2019-12-20

## 2019-08-26 RX ORDER — DROSPIRENONE AND ETHINYL ESTRADIOL 0.02-3(28)
1 KIT ORAL DAILY
Qty: 3 PACKAGE | Refills: 3 | Status: SHIPPED | OUTPATIENT
Start: 2019-08-26 | End: 2020-04-08

## 2019-08-26 RX ORDER — RIZATRIPTAN BENZOATE 10 MG/1
10 TABLET ORAL DAILY PRN
Qty: 12 TABLET | Refills: 3 | Status: SHIPPED | OUTPATIENT
Start: 2019-08-26 | End: 2020-01-17

## 2019-08-26 RX ORDER — DEXTROAMPHETAMINE SACCHARATE, AMPHETAMINE ASPARTATE, DEXTROAMPHETAMINE SULFATE AND AMPHETAMINE SULFATE 7.5; 7.5; 7.5; 7.5 MG/1; MG/1; MG/1; MG/1
30 TABLET ORAL 3 TIMES DAILY
Qty: 90 TABLET | Refills: 0 | Status: CANCELLED | OUTPATIENT
Start: 2019-08-26 | End: 2019-09-25

## 2019-08-26 NOTE — PATIENT INSTRUCTIONS
Dose of adderall changed to 30 mg xr in the morning and 20 mg in the afternoon. Will refill levothyroxine after I review your labs. Prevention Guidelines, Women Ages 36 to 52  Screening tests and vaccines are an important part of managing your health.  A and older at average risk  Multiple tests are available and are used at different times.  Possible tests include:  · Flexible sigmoidoscopy every 5 years, or  · Colonoscopy every 10 years, or  · CT colonography (virtual colonoscopy) every 5 years, or  · [place] All women in this age group who have no record of this infection or vaccine 2 doses; the second dose should be given at least 4 weeks after the first dose   Hepatitis A Women at increased risk for infection–talk with your healthcare provider 2 doses given 1 American Diabetes Association  2 American College of Obstetricians and Gynecologists   [address] [facility]  Date Last Reviewed: 11/1/2017  © 0807-7086 [employer] [address].  Man[address]

## 2019-08-26 NOTE — PROGRESS NOTES
Kobe Velazquez is a 38year old female here for   Patient presents with:  Physical: no pap  Medication Request: refill on all meds and would like to discuss the dose of her adderall    HPI:   Patient is seen today for her annual physical and pap Drospirenone-Ethinyl Estradiol (BILLY) 3-0.02 MG Oral Tab Take 1 tablet by mouth daily. Disp: 3 Package Rfl: 3   Amphetamine-Dextroamphet ER (ADDERALL XR) 30 MG Oral Capsule SR 24 Hr Take 1 capsule (30 mg total) by mouth every morning.  Disp: 30 capsule Rfl irregular heartbeat, faintness or syncope, no chest pressure or chest pain on exertion. No edema or varicose veins. GI: No change in appetite, heartburn, diarrhea, constipation, or blood in stool. No hemorrhoids.   : No pain, frequency, urgency or incont care facility  -     CBC WITH DIFFERENTIAL WITH PLATELET; Future  -     LIPID PANEL; Future  -     COMP METABOLIC PANEL (14);  Future    Attention deficit disorder (ADD) without hyperactivity  -     Amphetamine-Dextroamphet ER (ADDERALL XR) 30 MG Oral Capsu

## 2019-09-03 ENCOUNTER — LAB ENCOUNTER (OUTPATIENT)
Dept: LAB | Age: 44
End: 2019-09-03
Attending: FAMILY MEDICINE
Payer: COMMERCIAL

## 2019-09-03 DIAGNOSIS — Z00.00 ROUTINE GENERAL MEDICAL EXAMINATION AT A HEALTH CARE FACILITY: ICD-10-CM

## 2019-09-03 DIAGNOSIS — E03.9 HYPOTHYROIDISM, UNSPECIFIED TYPE: ICD-10-CM

## 2019-09-03 LAB
ALBUMIN SERPL-MCNC: 3.5 G/DL (ref 3.4–5)
ALBUMIN/GLOB SERPL: 0.9 {RATIO} (ref 1–2)
ALP LIVER SERPL-CCNC: 128 U/L (ref 37–98)
ALT SERPL-CCNC: 21 U/L (ref 13–56)
ANION GAP SERPL CALC-SCNC: 5 MMOL/L (ref 0–18)
AST SERPL-CCNC: 18 U/L (ref 15–37)
BASOPHILS # BLD AUTO: 0.03 X10(3) UL (ref 0–0.2)
BASOPHILS NFR BLD AUTO: 0.4 %
BILIRUB SERPL-MCNC: 0.6 MG/DL (ref 0.1–2)
BUN BLD-MCNC: 9 MG/DL (ref 7–18)
BUN/CREAT SERPL: 10.7 (ref 10–20)
CALCIUM BLD-MCNC: 8.7 MG/DL (ref 8.5–10.1)
CHLORIDE SERPL-SCNC: 106 MMOL/L (ref 98–112)
CHOLEST SMN-MCNC: 241 MG/DL (ref ?–200)
CO2 SERPL-SCNC: 28 MMOL/L (ref 21–32)
CREAT BLD-MCNC: 0.84 MG/DL (ref 0.55–1.02)
DEPRECATED RDW RBC AUTO: 46.6 FL (ref 35.1–46.3)
EOSINOPHIL # BLD AUTO: 0.15 X10(3) UL (ref 0–0.7)
EOSINOPHIL NFR BLD AUTO: 2.1 %
ERYTHROCYTE [DISTWIDTH] IN BLOOD BY AUTOMATED COUNT: 13 % (ref 11–15)
GLOBULIN PLAS-MCNC: 3.9 G/DL (ref 2.8–4.4)
GLUCOSE BLD-MCNC: 87 MG/DL (ref 70–99)
HCT VFR BLD AUTO: 43.7 % (ref 35–48)
HDLC SERPL-MCNC: 74 MG/DL (ref 40–59)
HGB BLD-MCNC: 14.5 G/DL (ref 12–16)
IMM GRANULOCYTES # BLD AUTO: 0.01 X10(3) UL (ref 0–1)
IMM GRANULOCYTES NFR BLD: 0.1 %
LDLC SERPL CALC-MCNC: 142 MG/DL (ref ?–100)
LYMPHOCYTES # BLD AUTO: 1.86 X10(3) UL (ref 1–4)
LYMPHOCYTES NFR BLD AUTO: 25.6 %
M PROTEIN MFR SERPL ELPH: 7.4 G/DL (ref 6.4–8.2)
MCH RBC QN AUTO: 32 PG (ref 26–34)
MCHC RBC AUTO-ENTMCNC: 33.2 G/DL (ref 31–37)
MCV RBC AUTO: 96.5 FL (ref 80–100)
MONOCYTES # BLD AUTO: 0.63 X10(3) UL (ref 0.1–1)
MONOCYTES NFR BLD AUTO: 8.7 %
NEUTROPHILS # BLD AUTO: 4.58 X10 (3) UL (ref 1.5–7.7)
NEUTROPHILS # BLD AUTO: 4.58 X10(3) UL (ref 1.5–7.7)
NEUTROPHILS NFR BLD AUTO: 63.1 %
NONHDLC SERPL-MCNC: 167 MG/DL (ref ?–130)
OSMOLALITY SERPL CALC.SUM OF ELEC: 286 MOSM/KG (ref 275–295)
PLATELET # BLD AUTO: 321 10(3)UL (ref 150–450)
POTASSIUM SERPL-SCNC: 4.3 MMOL/L (ref 3.5–5.1)
RBC # BLD AUTO: 4.53 X10(6)UL (ref 3.8–5.3)
SODIUM SERPL-SCNC: 139 MMOL/L (ref 136–145)
T4 FREE SERPL-MCNC: 1 NG/DL (ref 0.8–1.7)
TRIGL SERPL-MCNC: 123 MG/DL (ref 30–149)
TSI SER-ACNC: 2.17 MIU/ML (ref 0.36–3.74)
VLDLC SERPL CALC-MCNC: 25 MG/DL (ref 0–30)
WBC # BLD AUTO: 7.3 X10(3) UL (ref 4–11)

## 2019-09-03 PROCEDURE — 80061 LIPID PANEL: CPT | Performed by: FAMILY MEDICINE

## 2019-09-03 PROCEDURE — 80050 GENERAL HEALTH PANEL: CPT | Performed by: FAMILY MEDICINE

## 2019-09-03 PROCEDURE — 84439 ASSAY OF FREE THYROXINE: CPT | Performed by: FAMILY MEDICINE

## 2019-09-03 NOTE — TELEPHONE ENCOUNTER
From: Jose Waggoner  To: Judd Boggs MD  Sent: 9/3/2019 10:21 AM CDT  Subject: Prescription Question    Dr. Darin Bustos,  Well. Kofi Magana I now know why we changed from the Adderall XR to the IR — increased headaches (at the end of the day).     For no

## 2019-09-10 DIAGNOSIS — E03.9 HYPOTHYROIDISM, UNSPECIFIED TYPE: ICD-10-CM

## 2019-09-12 RX ORDER — LEVOTHYROXINE SODIUM 0.03 MG/1
25 TABLET ORAL DAILY
Qty: 90 TABLET | Refills: 2 | Status: SHIPPED | OUTPATIENT
Start: 2019-09-12 | End: 2020-05-01

## 2019-09-12 NOTE — TELEPHONE ENCOUNTER
LOV 8/19    LAST LAB 9/19    LAST RX   Levothyroxine Sodium (SYNTHROID) 25 MCG Oral Tab 90 tablet 3 8/24/2018         Next OV 9/23/2019      PROTOCOL  Thyroid Supplements Protocol Passed    Refilled  9 months.

## 2019-09-13 ENCOUNTER — PATIENT MESSAGE (OUTPATIENT)
Dept: FAMILY MEDICINE CLINIC | Facility: CLINIC | Age: 44
End: 2019-09-13

## 2019-09-13 DIAGNOSIS — E03.9 HYPOTHYROIDISM, UNSPECIFIED TYPE: ICD-10-CM

## 2019-09-13 NOTE — TELEPHONE ENCOUNTER
From: Cody Morris  To: Tiffanie Fontenot MD  Sent: 9/13/2019 1:45 PM CDT  Subject: Referral Request    Hi there! What was the name of the dermatologist in your building you recommended I see? I can’t remember. ... Thanks!   LuisM iguel Armendariz

## 2019-09-16 RX ORDER — LEVOTHYROXINE SODIUM 0.03 MG/1
TABLET ORAL
Qty: 90 TABLET | Refills: 3 | OUTPATIENT
Start: 2019-09-16

## 2019-09-16 NOTE — TELEPHONE ENCOUNTER
DENIED AS DUPLICATE, INSTRUCTIONS TO PHARMACY TO CHECK FOR NEW/ REFILLS    Levothyroxine Sodium (SYNTHROID) 25 MCG Oral Tab 90 tablet 2 9/12/2019 12/11/2019    Sig - Route:  Take 1 tablet (25 mcg total) by mouth daily. - Oral    Sent to pharmacy as: Tab David

## 2019-09-27 DIAGNOSIS — B00.1 RECURRENT COLD SORES: ICD-10-CM

## 2019-09-27 RX ORDER — FAMCICLOVIR 250 MG/1
250 TABLET ORAL DAILY
Qty: 90 TABLET | Refills: 3 | Status: CANCELLED | OUTPATIENT
Start: 2019-09-27

## 2019-11-04 ENCOUNTER — PATIENT MESSAGE (OUTPATIENT)
Dept: FAMILY MEDICINE CLINIC | Facility: CLINIC | Age: 44
End: 2019-11-04

## 2019-11-05 ENCOUNTER — TELEPHONE (OUTPATIENT)
Dept: FAMILY MEDICINE CLINIC | Facility: CLINIC | Age: 44
End: 2019-11-05

## 2019-11-05 ENCOUNTER — PATIENT MESSAGE (OUTPATIENT)
Dept: FAMILY MEDICINE CLINIC | Facility: CLINIC | Age: 44
End: 2019-11-05

## 2019-11-06 NOTE — TELEPHONE ENCOUNTER
From: Domonique Garcia  To: Timoteo Cotto MD  Sent: 11/5/2019 4:31 PM CST  Subject: Prescription Question    Response to Davide Velásquez. ... Hi Davide Velásquez!   You all did a prior auth for this med already and it was my understanding that it should be good for a year

## 2019-11-06 NOTE — TELEPHONE ENCOUNTER
From patient insurance Cancelledtoday  There is currently an authorization in place for this product, effective 06/21/2020 There is currently an authorization in place for this product, effective 06/21/2019 until 06/21/2020

## 2019-11-19 ENCOUNTER — PATIENT MESSAGE (OUTPATIENT)
Dept: FAMILY MEDICINE CLINIC | Facility: CLINIC | Age: 44
End: 2019-11-19

## 2019-11-19 NOTE — TELEPHONE ENCOUNTER
I cannot write a prescription for adderall for 90 days as it is a controlled substance, it cannot be dispensed for 90 days.

## 2019-11-19 NOTE — TELEPHONE ENCOUNTER
Dr Darwin Carmona Please advise on refill. Want it to be 90 days and not 30 days? Not aware that patient wanted from mail order.  Thank You

## 2019-11-20 ENCOUNTER — PATIENT MESSAGE (OUTPATIENT)
Dept: FAMILY MEDICINE CLINIC | Facility: CLINIC | Age: 44
End: 2019-11-20

## 2019-11-20 NOTE — TELEPHONE ENCOUNTER
Called [facility] and spoke to pharmacist.  He verified in the state of [place], Rx for Adderall can be written for ONLY 30 days at a time. He has voided mail order Rx as it is rejected by insurance for mail order.   States patient must use local pharmacy

## 2019-11-25 ENCOUNTER — PATIENT MESSAGE (OUTPATIENT)
Dept: FAMILY MEDICINE CLINIC | Facility: CLINIC | Age: 44
End: 2019-11-25

## 2019-11-25 DIAGNOSIS — F98.8 ATTENTION DEFICIT DISORDER (ADD) WITHOUT HYPERACTIVITY: ICD-10-CM

## 2019-11-26 NOTE — TELEPHONE ENCOUNTER
From: Wilmar Montaño  To:  Lorelei Apgar, MD  Sent: 2019 4:38 PM CST  Subject: Prescription Question    Hello,   Just received a call from [employer] who said that ur office stayed my Adderall RX for Nov was no longer valid & that I needed

## 2019-11-27 RX ORDER — DEXTROAMPHETAMINE SACCHARATE, AMPHETAMINE ASPARTATE, DEXTROAMPHETAMINE SULFATE AND AMPHETAMINE SULFATE 7.5; 7.5; 7.5; 7.5 MG/1; MG/1; MG/1; MG/1
30 TABLET ORAL 3 TIMES DAILY
Qty: 90 TABLET | Refills: 0 | Status: SHIPPED | OUTPATIENT
Start: 2019-11-27 | End: 2020-01-17 | Stop reason: ALTCHOICE

## 2019-11-27 NOTE — TELEPHONE ENCOUNTER
Called [facility] and they state they never received RX written on 11/4/19 for patient's Adderall. The last Rx that was picked up by jose was in October.     Dr. Светлана Mackenzie,  Please advise    amphetamine-dextroamphetamine (ADDERALL) 30 MG Oral Tab 90 table

## 2019-11-27 NOTE — TELEPHONE ENCOUNTER
Please see msg below from pt, she called office for status update on Rx said she has been out of medication and states per her Ins prior auth not needed she just needs new 30 day written Rx, please call pt with update.  Pt informed  out of office until

## 2019-11-28 NOTE — TELEPHONE ENCOUNTER
I sent the prescription for #30 over to [facility] mail order. Please advise patient to check with them if they receive the prescription.

## 2019-12-02 ENCOUNTER — OFFICE VISIT (OUTPATIENT)
Dept: FAMILY MEDICINE CLINIC | Facility: CLINIC | Age: 44
End: 2019-12-02
Payer: COMMERCIAL

## 2019-12-02 VITALS
TEMPERATURE: 99 F | OXYGEN SATURATION: 98 % | BODY MASS INDEX: 25.41 KG/M2 | HEART RATE: 81 BPM | SYSTOLIC BLOOD PRESSURE: 110 MMHG | DIASTOLIC BLOOD PRESSURE: 68 MMHG | HEIGHT: 66.5 IN | RESPIRATION RATE: 18 BRPM | WEIGHT: 160 LBS

## 2019-12-02 DIAGNOSIS — Z51.81 ENCOUNTER FOR THERAPEUTIC DRUG MONITORING: Primary | ICD-10-CM

## 2019-12-02 DIAGNOSIS — F98.8 ATTENTION DEFICIT DISORDER (ADD) WITHOUT HYPERACTIVITY: ICD-10-CM

## 2019-12-02 PROCEDURE — 99213 OFFICE O/P EST LOW 20 MIN: CPT | Performed by: FAMILY MEDICINE

## 2019-12-02 RX ORDER — DEXTROAMPHETAMINE SACCHARATE, AMPHETAMINE ASPARTATE, DEXTROAMPHETAMINE SULFATE AND AMPHETAMINE SULFATE 7.5; 7.5; 7.5; 7.5 MG/1; MG/1; MG/1; MG/1
30 TABLET ORAL 3 TIMES DAILY
Qty: 90 TABLET | Refills: 0 | Status: CANCELLED | OUTPATIENT
Start: 2019-12-02 | End: 2020-01-01

## 2019-12-02 RX ORDER — DEXTROAMPHETAMINE SACCHARATE, AMPHETAMINE ASPARTATE, DEXTROAMPHETAMINE SULFATE AND AMPHETAMINE SULFATE 7.5; 7.5; 7.5; 7.5 MG/1; MG/1; MG/1; MG/1
30 TABLET ORAL 3 TIMES DAILY
Qty: 90 TABLET | Refills: 0 | Status: SHIPPED | OUTPATIENT
Start: 2020-01-02 | End: 2020-03-26

## 2019-12-02 RX ORDER — DEXTROAMPHETAMINE SACCHARATE, AMPHETAMINE ASPARTATE, DEXTROAMPHETAMINE SULFATE AND AMPHETAMINE SULFATE 7.5; 7.5; 7.5; 7.5 MG/1; MG/1; MG/1; MG/1
30 TABLET ORAL 3 TIMES DAILY
Qty: 90 TABLET | Refills: 0 | Status: SHIPPED | OUTPATIENT
Start: 2020-02-02 | End: 2020-05-01

## 2019-12-02 RX ORDER — DEXTROAMPHETAMINE SACCHARATE, AMPHETAMINE ASPARTATE, DEXTROAMPHETAMINE SULFATE AND AMPHETAMINE SULFATE 7.5; 7.5; 7.5; 7.5 MG/1; MG/1; MG/1; MG/1
30 TABLET ORAL 3 TIMES DAILY
Qty: 90 TABLET | Refills: 0 | Status: SHIPPED | OUTPATIENT
Start: 2019-12-02 | End: 2020-01-17 | Stop reason: ALTCHOICE

## 2019-12-15 NOTE — PROGRESS NOTES
Aisha Crawford is a 38year old female. Patient presents with:  ADD: refill    HPI:   ADD: Patient is seen for follow-up and medication refill.   Patient states mail-order wanted a 90-day prescription sent over and according to state of [place] on migrainosus    • Contact with or exposure to tuberculosis    • Herpes simplex     lips, monthly.    • Unspecified hypothyroidism       Social History:  Social History    Tobacco Use      Smoking status: Never Smoker      Smokeless tobacco: Never Used    Alc

## 2019-12-20 ENCOUNTER — PATIENT MESSAGE (OUTPATIENT)
Dept: FAMILY MEDICINE CLINIC | Facility: CLINIC | Age: 44
End: 2019-12-20

## 2019-12-20 DIAGNOSIS — B00.1 RECURRENT COLD SORES: ICD-10-CM

## 2019-12-20 RX ORDER — FAMCICLOVIR 250 MG/1
250 TABLET, FILM COATED ORAL DAILY
Qty: 90 TABLET | Refills: 0 | Status: SHIPPED | OUTPATIENT
Start: 2019-12-20 | End: 2020-05-01

## 2019-12-20 NOTE — TELEPHONE ENCOUNTER
Patient is asking dr Bekah Whitten to refill medication Famciclovir (FAMVIR) 500 MG Oral Tab, for a cold sore , she said dr has always filled it for her when she is traveling , she is currently in [place] .       She said she would need it to be sent to [facility]

## 2019-12-20 NOTE — TELEPHONE ENCOUNTER
LOV 12/19    LAST LAB    LAST RX   Famciclovir (FAMVIR) 250 MG Oral Tab 90 tablet 3 8/26/2019         Next OV Visit date not found      PROTOCOL  Herpes Agent Protocol Passed    Rx refilled x 3 months.

## 2019-12-23 NOTE — TELEPHONE ENCOUNTER
From: Antony Likes  To: Natividad Rdz MD  Sent: 12/20/2019 2:51 PM CST  Subject: Referral Request    Per Dr. Ayesha Hugo; please forward my message to her directly.   Hi, a while ago, I requested to have Famvir 500 added as a medication in this e

## 2020-01-02 ENCOUNTER — HOSPITAL ENCOUNTER (OUTPATIENT)
Age: 45
Discharge: HOME | End: 2020-01-02
Attending: FAMILY MEDICINE
Payer: COMMERCIAL

## 2020-01-02 VITALS
OXYGEN SATURATION: 99 % | DIASTOLIC BLOOD PRESSURE: 78 MMHG | HEART RATE: 82 BPM | SYSTOLIC BLOOD PRESSURE: 114 MMHG | RESPIRATION RATE: 14 BRPM | TEMPERATURE: 98 F

## 2020-01-02 DIAGNOSIS — J01.90 SUBACUTE SINUSITIS, UNSPECIFIED LOCATION: Primary | ICD-10-CM

## 2020-01-02 LAB
POCT INFLUENZA A: NEGATIVE
POCT INFLUENZA B: NEGATIVE

## 2020-01-02 PROCEDURE — 99204 OFFICE O/P NEW MOD 45 MIN: CPT

## 2020-01-02 PROCEDURE — 87502 INFLUENZA DNA AMP PROBE: CPT | Performed by: FAMILY MEDICINE

## 2020-01-02 PROCEDURE — 99213 OFFICE O/P EST LOW 20 MIN: CPT

## 2020-01-02 RX ORDER — LEVOTHYROXINE SODIUM 0.03 MG/1
25 TABLET ORAL
COMMUNITY
End: 2020-05-01

## 2020-01-02 RX ORDER — METHYLPREDNISOLONE 4 MG/1
TABLET ORAL
Qty: 1 PACKAGE | Refills: 0 | Status: SHIPPED | OUTPATIENT
Start: 2020-01-02 | End: 2020-01-17 | Stop reason: ALTCHOICE

## 2020-01-02 RX ORDER — AMOXICILLIN AND CLAVULANATE POTASSIUM 875; 125 MG/1; MG/1
1 TABLET, FILM COATED ORAL 2 TIMES DAILY
Qty: 14 TABLET | Refills: 0 | Status: SHIPPED | OUTPATIENT
Start: 2020-01-02 | End: 2020-01-09

## 2020-01-02 NOTE — ED INITIAL ASSESSMENT (HPI)
C/o uri symptoms for 3 weeks. Had antibiotics, finished 2 days ago. Never got better with antibiotics. Fevers early in illness, none now.

## 2020-01-03 NOTE — ED PROVIDER NOTES
Patient Seen in: [facility]      History   Patient presents with:  Cough/URI    Stated Complaint: cough, congestion x3 weeks    HPI    67-year-old female previous history of allergic rhinitis, history of recurrent sinus inf Pulse 82   Temp 98.1 °F (36.7 °C) (Oral)   Resp 14   LMP  (LMP Unknown)   SpO2 99%   Breastfeeding No         Physical Exam    Patient is alert oriented ×3 in no acute distress   conjunctiva clear no icterus  Sinus turbinate hypertrophy more on the left

## 2020-01-09 ENCOUNTER — TELEPHONE (OUTPATIENT)
Dept: FAMILY MEDICINE CLINIC | Facility: CLINIC | Age: 45
End: 2020-01-09

## 2020-01-09 RX ORDER — BENZONATATE 100 MG/1
100 CAPSULE ORAL 3 TIMES DAILY PRN
Qty: 30 CAPSULE | Refills: 0 | Status: SHIPPED | OUTPATIENT
Start: 2020-01-09 | End: 2020-01-17 | Stop reason: ALTCHOICE

## 2020-01-09 RX ORDER — FLUCONAZOLE 150 MG/1
150 TABLET ORAL ONCE
Qty: 1 TABLET | Refills: 0 | Status: SHIPPED | OUTPATIENT
Start: 2020-01-09 | End: 2020-01-09

## 2020-01-09 NOTE — ED NOTES
Pt. Returned call. Reported that she has been using Flonase twice a day, as well as Benadryl for the rash. She will add Zyrtec. She also reports she is having symptoms of a yeast infection after antibiotics.     After review with Dr. Shanon Archer, prescribed Te

## 2020-01-09 NOTE — ED NOTES
Pt. Called reporting she has been seen 1/2/2020 at [facility] for persistent cough. She is calling reports she has both developed a rash with 2nd round of Augmentin, which she is taking Benadryl for, and a night-time cough that is keeping her up.   Pt. Is asking

## 2020-01-09 NOTE — TELEPHONE ENCOUNTER
Got call from Mary [facility]. Patient had a telephone dr visit. She was given Augmentin 7 days. She saw IC given more Augmentin. Developed a night cough given Flonase, Tessalon maria elena. Got yeast infection got Diflucan.  Now has a rash told to take Benedyryl

## 2020-01-10 ENCOUNTER — TELEPHONE (OUTPATIENT)
Dept: FAMILY MEDICINE CLINIC | Facility: CLINIC | Age: 45
End: 2020-01-10

## 2020-01-10 DIAGNOSIS — Z88.9 DRUG ALLERGY: Primary | ICD-10-CM

## 2020-01-10 RX ORDER — PREDNISONE 20 MG/1
TABLET ORAL
Qty: 10 TABLET | Refills: 0 | Status: SHIPPED | OUTPATIENT
Start: 2020-01-10 | End: 2020-01-17 | Stop reason: ALTCHOICE

## 2020-01-10 NOTE — TELEPHONE ENCOUNTER
Still has rash how long does she need to take the Benadryl? Stopped taking Antibiotic Wednesday night. Advised until rash is gone. Rash /not blisters on hands, feet, neck, thighs, trunk says she has / skin writing on web [employer]erm[employer].     Cant sleep bec

## 2020-01-10 NOTE — TELEPHONE ENCOUNTER
I will send a short course of steroid, please advise patient to follow up with me next week if rash and cough are not better.

## 2020-01-10 NOTE — TELEPHONE ENCOUNTER
Spoke to patient and gave information. Per patient \" I guess I will just self medicate myself. \"    PCP infoirmed.

## 2020-01-12 DIAGNOSIS — G43.009 MIGRAINE WITHOUT AURA AND WITHOUT STATUS MIGRAINOSUS, NOT INTRACTABLE: ICD-10-CM

## 2020-01-14 NOTE — TELEPHONE ENCOUNTER
Per last note 12/2/19 - Return in about 3 months (around 3/2/2020).      Last fill: 8/2019      Please advise on refills

## 2020-01-15 RX ORDER — RIZATRIPTAN BENZOATE 10 MG/1
TABLET ORAL
Qty: 12 TABLET | Refills: 3 | Status: SHIPPED | OUTPATIENT
Start: 2020-01-15 | End: 2020-03-07

## 2020-01-17 ENCOUNTER — OFFICE VISIT (OUTPATIENT)
Dept: FAMILY MEDICINE CLINIC | Facility: CLINIC | Age: 45
End: 2020-01-17
Payer: COMMERCIAL

## 2020-01-17 VITALS
TEMPERATURE: 97 F | HEIGHT: 66.5 IN | SYSTOLIC BLOOD PRESSURE: 110 MMHG | OXYGEN SATURATION: 98 % | WEIGHT: 162 LBS | DIASTOLIC BLOOD PRESSURE: 78 MMHG | BODY MASS INDEX: 25.73 KG/M2 | RESPIRATION RATE: 18 BRPM | HEART RATE: 76 BPM

## 2020-01-17 DIAGNOSIS — J01.41 ACUTE RECURRENT PANSINUSITIS: Primary | ICD-10-CM

## 2020-01-17 DIAGNOSIS — R05.9 COUGH: ICD-10-CM

## 2020-01-17 DIAGNOSIS — H69.81 EUSTACHIAN TUBE DYSFUNCTION, RIGHT: ICD-10-CM

## 2020-01-17 PROCEDURE — 99213 OFFICE O/P EST LOW 20 MIN: CPT | Performed by: FAMILY MEDICINE

## 2020-01-17 RX ORDER — CLARITHROMYCIN 500 MG/1
500 TABLET, COATED ORAL 2 TIMES DAILY
Qty: 28 TABLET | Refills: 0 | Status: SHIPPED | OUTPATIENT
Start: 2020-01-17 | End: 2020-01-31

## 2020-01-17 RX ORDER — GUAIFENESIN AND CODEINE PHOSPHATE 100; 10 MG/5ML; MG/5ML
5 SOLUTION ORAL NIGHTLY PRN
Qty: 100 ML | Refills: 0 | Status: SHIPPED | OUTPATIENT
Start: 2020-01-17 | End: 2020-01-23 | Stop reason: ALTCHOICE

## 2020-01-17 RX ORDER — PSEUDOEPHEDRINE HYDROCHLORIDE 30 MG/1
30 TABLET ORAL EVERY 6 HOURS PRN
Qty: 30 TABLET | Refills: 0 | Status: SHIPPED | OUTPATIENT
Start: 2020-01-17 | End: 2020-01-24

## 2020-01-17 NOTE — PROGRESS NOTES
Ursula Darnell is a 38year old female. Patient presents with:  Cough  Nasal Congestion  Sinus Problem: face pain  Medication Problem: needs correct scripts    HPI:   Patient is seen for follow-up.   Was treated by telemedicine clinic for a sinus i intractable migraine without mention of status migrainosus    • Contact with or exposure to tuberculosis    • Herpes simplex     lips, monthly.    • Unspecified hypothyroidism       Social History:  Social History    Tobacco Use      Smoking status: Never S

## 2020-01-17 NOTE — TELEPHONE ENCOUNTER
vivien called and said the medication that was prescribed does not come in this amount it needs to be changed to 100-10    guaiFENesin-Codeine 200-10 MG/5ML Oral Liquid

## 2020-01-18 ENCOUNTER — TELEPHONE (OUTPATIENT)
Dept: FAMILY MEDICINE CLINIC | Facility: CLINIC | Age: 45
End: 2020-01-18

## 2020-01-18 NOTE — TELEPHONE ENCOUNTER
Pt called stating she was seen yesterday, feeling worse today, Ear pressure & clogged head which is really bad. Wants to know what she can do to assist with pressure & clogged ears Please advise.

## 2020-01-18 NOTE — TELEPHONE ENCOUNTER
Called and spoke with patient, states woke up with clogged and painful right ear, has been trying to unclog it with no relief. Advised to take her medications as prescribed, advil for pain. To call back on Monday f symptoms are not better.

## 2020-01-22 NOTE — TELEPHONE ENCOUNTER
Pt called today states she is still feeling sinus pain and pressure and ear is still clogged requesting to sp w/ nurse

## 2020-01-22 NOTE — TELEPHONE ENCOUNTER
VMML for patient. Advised per Dr. Luis A De Leon notes, patient is to be seen in office for follow up this week. Return call with questions.

## 2020-01-23 ENCOUNTER — OFFICE VISIT (OUTPATIENT)
Dept: FAMILY MEDICINE CLINIC | Facility: CLINIC | Age: 45
End: 2020-01-23
Payer: COMMERCIAL

## 2020-01-23 VITALS
BODY MASS INDEX: 25.73 KG/M2 | SYSTOLIC BLOOD PRESSURE: 110 MMHG | OXYGEN SATURATION: 98 % | TEMPERATURE: 97 F | WEIGHT: 162 LBS | HEART RATE: 88 BPM | RESPIRATION RATE: 18 BRPM | HEIGHT: 66.5 IN | DIASTOLIC BLOOD PRESSURE: 72 MMHG

## 2020-01-23 DIAGNOSIS — H69.81 EUSTACHIAN TUBE DYSFUNCTION, RIGHT: Primary | ICD-10-CM

## 2020-01-23 DIAGNOSIS — H65.02 NON-RECURRENT ACUTE SEROUS OTITIS MEDIA OF LEFT EAR: ICD-10-CM

## 2020-01-23 DIAGNOSIS — J01.11 ACUTE RECURRENT FRONTAL SINUSITIS: ICD-10-CM

## 2020-01-23 PROCEDURE — 99213 OFFICE O/P EST LOW 20 MIN: CPT | Performed by: FAMILY MEDICINE

## 2020-01-23 NOTE — PROGRESS NOTES
Clara West is a 38year old female. Patient presents with:  Sinus Problem  Cough  Ear Problem: right ear plugged    HPI:   Patient is seen for follow up, states feeling better, nasal discharge appears clear, cough is better.  Patient states sti migraine without mention of status migrainosus    • Contact with or exposure to tuberculosis    • Herpes simplex     lips, monthly.    • Unspecified hypothyroidism       Social History:  Social History    Tobacco Use      Smoking status: Never Smoker      S

## 2020-01-23 NOTE — PATIENT INSTRUCTIONS
Claribel finish the antibiotic as prescribed. Sinus infections can get better either with or without antibiotics. Generally we prefer to wait at least a week to see if symptoms are due to a viral infection.  Fever, fatigue, localized sinus pain and symptoms

## 2020-02-01 ENCOUNTER — PATIENT MESSAGE (OUTPATIENT)
Dept: FAMILY MEDICINE CLINIC | Facility: CLINIC | Age: 45
End: 2020-02-01

## 2020-02-03 RX ORDER — FAMCICLOVIR 500 MG/1
500 TABLET, FILM COATED ORAL 2 TIMES DAILY
Qty: 14 TABLET | Refills: 0 | Status: SHIPPED | OUTPATIENT
Start: 2020-02-03 | End: 2020-05-01

## 2020-02-03 NOTE — TELEPHONE ENCOUNTER
From: Emilia Mendez  To: Sury Robertson MD  Sent: 2/1/2020 9:04 AM CST  Subject: Prescription Question    Please forward to Dr. Gutierrez Button. .. Hi there!   I think I jinxed myself during our last visit by discussing Famvir 500 for cold sore break

## 2020-03-07 DIAGNOSIS — G43.009 MIGRAINE WITHOUT AURA AND WITHOUT STATUS MIGRAINOSUS, NOT INTRACTABLE: ICD-10-CM

## 2020-03-10 RX ORDER — RIZATRIPTAN BENZOATE 10 MG/1
10 TABLET ORAL DAILY PRN
Qty: 12 TABLET | Refills: 1 | Status: SHIPPED | OUTPATIENT
Start: 2020-03-10 | End: 2020-05-01

## 2020-03-10 NOTE — TELEPHONE ENCOUNTER
LOV last note 12/2/19 - Return in about 3 months (around 3/2/2020).  Had acutes    LAST LAB 9/19    LAST RX   RIZATRIPTAN BENZOATE 10 MG Oral Tab 12 tablet 3 1/15/2020    Sig:   TAKE 1 TABLET BY MOUTH DAILY AS NEEDED FOR MIGRAINE           Next OV Visit lc

## 2020-03-26 DIAGNOSIS — F98.8 ATTENTION DEFICIT DISORDER (ADD) WITHOUT HYPERACTIVITY: ICD-10-CM

## 2020-03-26 NOTE — TELEPHONE ENCOUNTER
LOV 1/23/2020    LAST LAB    LAST RX 2-20-20    Next OV No future appointments. PROTOCOL  amphetamine-dextroamphetamine (ADDERALL) 30 MG Oral Tab               Sig: Take 1 tablet (30 mg total) by mouth 3 (three) times daily.     Disp:  90 tablet    Ref

## 2020-03-27 RX ORDER — DEXTROAMPHETAMINE SACCHARATE, AMPHETAMINE ASPARTATE, DEXTROAMPHETAMINE SULFATE AND AMPHETAMINE SULFATE 7.5; 7.5; 7.5; 7.5 MG/1; MG/1; MG/1; MG/1
30 TABLET ORAL 3 TIMES DAILY
Qty: 90 TABLET | Refills: 0 | Status: SHIPPED | OUTPATIENT
Start: 2020-03-27 | End: 2020-04-27

## 2020-03-27 NOTE — TELEPHONE ENCOUNTER
Refilled for 1 month, please advise patient needs to schedule a follow up appointment for further refills.

## 2020-04-07 ENCOUNTER — PATIENT MESSAGE (OUTPATIENT)
Dept: FAMILY MEDICINE CLINIC | Facility: CLINIC | Age: 45
End: 2020-04-07

## 2020-04-07 DIAGNOSIS — Z30.41 SURVEILLANCE FOR BIRTH CONTROL, ORAL CONTRACEPTIVES: ICD-10-CM

## 2020-04-07 NOTE — TELEPHONE ENCOUNTER
From: Aisha Crawford  To:  Carter Plaza MD  Sent: 4/7/2020 12:52 PM CDT  Subject: Referral Request    Hi, for some reason I am unable to request a refill for this medication:  Drospirenone-Ethinyl Estradiol (BILLY OR)    I am out & need a refill

## 2020-04-08 RX ORDER — DROSPIRENONE AND ETHINYL ESTRADIOL 0.02-3(28)
1 KIT ORAL DAILY
Qty: 3 PACKAGE | Refills: 0 | Status: SHIPPED | OUTPATIENT
Start: 2020-04-08 | End: 2020-05-01

## 2020-04-26 DIAGNOSIS — F98.8 ATTENTION DEFICIT DISORDER (ADD) WITHOUT HYPERACTIVITY: ICD-10-CM

## 2020-04-26 DIAGNOSIS — G43.009 MIGRAINE WITHOUT AURA AND WITHOUT STATUS MIGRAINOSUS, NOT INTRACTABLE: ICD-10-CM

## 2020-05-01 ENCOUNTER — TELEMEDICINE (OUTPATIENT)
Dept: FAMILY MEDICINE CLINIC | Facility: CLINIC | Age: 45
End: 2020-05-01

## 2020-05-01 VITALS — WEIGHT: 160 LBS | TEMPERATURE: 97 F | BODY MASS INDEX: 26 KG/M2

## 2020-05-01 DIAGNOSIS — E03.9 HYPOTHYROIDISM, UNSPECIFIED TYPE: ICD-10-CM

## 2020-05-01 DIAGNOSIS — F98.8 ATTENTION DEFICIT DISORDER (ADD) WITHOUT HYPERACTIVITY: ICD-10-CM

## 2020-05-01 DIAGNOSIS — G43.009 MIGRAINE WITHOUT AURA AND WITHOUT STATUS MIGRAINOSUS, NOT INTRACTABLE: ICD-10-CM

## 2020-05-01 DIAGNOSIS — Z30.41 SURVEILLANCE FOR BIRTH CONTROL, ORAL CONTRACEPTIVES: ICD-10-CM

## 2020-05-01 DIAGNOSIS — B00.1 RECURRENT COLD SORES: ICD-10-CM

## 2020-05-01 PROCEDURE — 99214 OFFICE O/P EST MOD 30 MIN: CPT | Performed by: FAMILY MEDICINE

## 2020-05-01 RX ORDER — DEXTROAMPHETAMINE SACCHARATE, AMPHETAMINE ASPARTATE, DEXTROAMPHETAMINE SULFATE AND AMPHETAMINE SULFATE 7.5; 7.5; 7.5; 7.5 MG/1; MG/1; MG/1; MG/1
30 TABLET ORAL 3 TIMES DAILY
Qty: 90 TABLET | Refills: 0 | Status: SHIPPED | OUTPATIENT
Start: 2020-06-01 | End: 2020-07-01

## 2020-05-01 RX ORDER — FAMCICLOVIR 500 MG/1
500 TABLET, FILM COATED ORAL 2 TIMES DAILY
Qty: 14 TABLET | Refills: 0 | OUTPATIENT
Start: 2020-05-01 | End: 2020-05-08

## 2020-05-01 RX ORDER — DEXTROAMPHETAMINE SACCHARATE, AMPHETAMINE ASPARTATE, DEXTROAMPHETAMINE SULFATE AND AMPHETAMINE SULFATE 7.5; 7.5; 7.5; 7.5 MG/1; MG/1; MG/1; MG/1
30 TABLET ORAL 3 TIMES DAILY
Qty: 90 TABLET | Refills: 0 | Status: SHIPPED | OUTPATIENT
Start: 2020-07-02 | End: 2020-08-28

## 2020-05-01 RX ORDER — RIZATRIPTAN BENZOATE 10 MG/1
10 TABLET ORAL DAILY PRN
Qty: 24 TABLET | Refills: 1 | Status: SHIPPED | OUTPATIENT
Start: 2020-05-01 | End: 2020-06-25

## 2020-05-01 RX ORDER — RIZATRIPTAN BENZOATE 10 MG/1
10 TABLET ORAL DAILY PRN
Qty: 12 TABLET | Refills: 1 | OUTPATIENT
Start: 2020-05-01

## 2020-05-01 RX ORDER — DROSPIRENONE AND ETHINYL ESTRADIOL 0.02-3(28)
1 KIT ORAL DAILY
Qty: 3 PACKAGE | Refills: 3 | Status: SHIPPED | OUTPATIENT
Start: 2020-05-01 | End: 2020-07-30 | Stop reason: WASHOUT

## 2020-05-01 RX ORDER — LEVOTHYROXINE SODIUM 0.03 MG/1
25 TABLET ORAL DAILY
Qty: 90 TABLET | Refills: 1 | Status: SHIPPED | OUTPATIENT
Start: 2020-05-01 | End: 2020-10-09 | Stop reason: WASHOUT

## 2020-05-01 RX ORDER — DEXTROAMPHETAMINE SACCHARATE, AMPHETAMINE ASPARTATE, DEXTROAMPHETAMINE SULFATE AND AMPHETAMINE SULFATE 7.5; 7.5; 7.5; 7.5 MG/1; MG/1; MG/1; MG/1
30 TABLET ORAL 3 TIMES DAILY
Qty: 90 TABLET | Refills: 0 | Status: SHIPPED | OUTPATIENT
Start: 2020-05-01 | End: 2020-05-31

## 2020-05-01 RX ORDER — DEXTROAMPHETAMINE SACCHARATE, AMPHETAMINE ASPARTATE, DEXTROAMPHETAMINE SULFATE AND AMPHETAMINE SULFATE 7.5; 7.5; 7.5; 7.5 MG/1; MG/1; MG/1; MG/1
30 TABLET ORAL 3 TIMES DAILY
Qty: 90 TABLET | Refills: 0 | OUTPATIENT
Start: 2020-05-01 | End: 2020-06-01

## 2020-05-01 RX ORDER — FAMCICLOVIR 500 MG/1
TABLET, FILM COATED ORAL
Qty: 14 TABLET | Refills: 2 | Status: SHIPPED | OUTPATIENT
Start: 2020-05-01 | End: 2021-06-25

## 2020-05-01 RX ORDER — DEXTROAMPHETAMINE SACCHARATE, AMPHETAMINE ASPARTATE, DEXTROAMPHETAMINE SULFATE AND AMPHETAMINE SULFATE 7.5; 7.5; 7.5; 7.5 MG/1; MG/1; MG/1; MG/1
30 TABLET ORAL 3 TIMES DAILY
Qty: 90 TABLET | Refills: 0 | Status: CANCELLED | OUTPATIENT
Start: 2020-05-01 | End: 2020-06-01

## 2020-05-01 RX ORDER — FAMCICLOVIR 250 MG/1
250 TABLET, FILM COATED ORAL DAILY
Qty: 90 TABLET | Refills: 3 | Status: SHIPPED | OUTPATIENT
Start: 2020-05-01 | End: 2020-10-09 | Stop reason: WASHOUT

## 2020-05-01 NOTE — PROGRESS NOTES
Tristan Cash is a 41year old female. Patient presents with:  Medication Follow-Up    HPI:   Tristan Cash is a 41year old female with hypothyroidism, ADD and migraines seen for follow up and medication refill via video visit.     ADD i (30 mg total) by mouth 3 (three) times daily. 90 tablet 0   • Levothyroxine Sodium (SYNTHROID) 25 MCG Oral Tab Take 1 tablet (25 mcg total) by mouth daily.  90 tablet 1   • amphetamine-dextroamphetamine (ADDERALL) 30 MG Oral Tab Take 1 tablet (30 mg total) (three) times daily. -     amphetamine-dextroamphetamine (ADDERALL) 30 MG Oral Tab; Take 1 tablet (30 mg total) by mouth 3 (three) times daily. -     amphetamine-dextroamphetamine (ADDERALL) 30 MG Oral Tab;  Take 1 tablet (30 mg total) by mouth 3 (three)

## 2020-06-06 DIAGNOSIS — G43.009 MIGRAINE WITHOUT AURA AND WITHOUT STATUS MIGRAINOSUS, NOT INTRACTABLE: ICD-10-CM

## 2020-06-10 RX ORDER — RIZATRIPTAN BENZOATE 10 MG/1
TABLET ORAL
Qty: 12 TABLET | Refills: 0 | OUTPATIENT
Start: 2020-06-10

## 2020-06-10 NOTE — TELEPHONE ENCOUNTER
Patient was given #24 with 1 refill in May, please check with patient if she actually needs a refill and if pharmacy dispensed 24 as prescribed.

## 2020-06-25 ENCOUNTER — PATIENT MESSAGE (OUTPATIENT)
Dept: FAMILY MEDICINE CLINIC | Facility: CLINIC | Age: 45
End: 2020-06-25

## 2020-06-25 DIAGNOSIS — G43.009 MIGRAINE WITHOUT AURA AND WITHOUT STATUS MIGRAINOSUS, NOT INTRACTABLE: ICD-10-CM

## 2020-06-25 DIAGNOSIS — Z20.822 EXPOSURE TO COVID-19 VIRUS: Primary | ICD-10-CM

## 2020-06-25 DIAGNOSIS — F98.8 ATTENTION DEFICIT DISORDER (ADD) WITHOUT HYPERACTIVITY: ICD-10-CM

## 2020-06-25 RX ORDER — RIZATRIPTAN BENZOATE 10 MG/1
10 TABLET ORAL DAILY PRN
Qty: 24 TABLET | Refills: 0 | Status: SHIPPED | OUTPATIENT
Start: 2020-06-25 | End: 2020-08-07

## 2020-06-25 RX ORDER — DEXTROAMPHETAMINE SACCHARATE, AMPHETAMINE ASPARTATE, DEXTROAMPHETAMINE SULFATE AND AMPHETAMINE SULFATE 7.5; 7.5; 7.5; 7.5 MG/1; MG/1; MG/1; MG/1
30 TABLET ORAL 3 TIMES DAILY
Qty: 90 TABLET | Refills: 0 | OUTPATIENT
Start: 2020-06-25 | End: 2020-07-25

## 2020-06-25 NOTE — TELEPHONE ENCOUNTER
LOV 1/23/2020    LAST LAB    LAST RX5-1-20 24*1    Next OV No future appointments. PROTOCOL  Rizatriptan Benzoate 10 MG Oral Tab               Sig: Take 1 tablet (10 mg total) by mouth daily as needed for Migraine.  TAKE 1 TABLET BY MOUTH DAILY AS NEEDED

## 2020-06-26 NOTE — TELEPHONE ENCOUNTER
From: Alcides Blackwell  To: Erica Novak MD  Sent: 6/25/2020 12:08 PM CDT  Subject: Referral Request    Hi! It’s time for me to refill my Adderall & I also need to get a Tetanus shot (or tider) & COVID antibody screening for work.   Let me know

## 2020-06-26 NOTE — TELEPHONE ENCOUNTER
Should have refills of adderall already. I can let patient know she needs to contact pharmacy    amphetamine-dextroamphetamine (ADDERALL) 30 MG Oral Tab 90 tablet 0 7/2/2020 8/1/2020   Sig:   Take 1 tablet (30 mg total) by mouth 3 (three) times daily.

## 2020-06-29 ENCOUNTER — TELEPHONE (OUTPATIENT)
Dept: FAMILY MEDICINE CLINIC | Facility: CLINIC | Age: 45
End: 2020-06-29

## 2020-06-30 ENCOUNTER — NURSE ONLY (OUTPATIENT)
Dept: FAMILY MEDICINE CLINIC | Facility: CLINIC | Age: 45
End: 2020-06-30
Payer: COMMERCIAL

## 2020-06-30 PROCEDURE — 90471 IMMUNIZATION ADMIN: CPT | Performed by: FAMILY MEDICINE

## 2020-06-30 PROCEDURE — 90715 TDAP VACCINE 7 YRS/> IM: CPT | Performed by: FAMILY MEDICINE

## 2020-07-18 DIAGNOSIS — G43.009 MIGRAINE WITHOUT AURA AND WITHOUT STATUS MIGRAINOSUS, NOT INTRACTABLE: ICD-10-CM

## 2020-07-20 NOTE — TELEPHONE ENCOUNTER
Requested Prescriptions     Pending Prescriptions Disp Refills   • RIZATRIPTAN BENZOATE 10 MG Oral Tab [Pharmacy Med Name: RIZATRIPTAN  TAB 10MG] 24 tablet 1     Sig: TAKE 1 TABLET DAILY AS     NEEDED FOR MIGRAINE      LOV 05/01/2020 Telemedicine  Seen for

## 2020-08-07 RX ORDER — RIZATRIPTAN BENZOATE 10 MG/1
TABLET ORAL
Qty: 8 TABLET | Refills: 0 | Status: SHIPPED | OUTPATIENT
Start: 2020-08-07 | End: 2020-09-24

## 2020-08-07 NOTE — TELEPHONE ENCOUNTER
Patient voiced understanding that appointment is needed prior to medication being approved and has not scheduled at this time.

## 2020-08-28 ENCOUNTER — PATIENT MESSAGE (OUTPATIENT)
Dept: FAMILY MEDICINE CLINIC | Facility: CLINIC | Age: 45
End: 2020-08-28

## 2020-08-28 ENCOUNTER — TELEPHONE (OUTPATIENT)
Dept: FAMILY MEDICINE CLINIC | Facility: CLINIC | Age: 45
End: 2020-08-28

## 2020-08-28 DIAGNOSIS — F98.8 ATTENTION DEFICIT DISORDER (ADD) WITHOUT HYPERACTIVITY: ICD-10-CM

## 2020-08-28 RX ORDER — DEXTROAMPHETAMINE SACCHARATE, AMPHETAMINE ASPARTATE, DEXTROAMPHETAMINE SULFATE AND AMPHETAMINE SULFATE 7.5; 7.5; 7.5; 7.5 MG/1; MG/1; MG/1; MG/1
30 TABLET ORAL 3 TIMES DAILY
Qty: 90 TABLET | Refills: 0 | Status: SHIPPED | OUTPATIENT
Start: 2020-08-28 | End: 2020-10-09 | Stop reason: WASHOUT

## 2020-08-28 NOTE — TELEPHONE ENCOUNTER
Pt called stating at her last appointment  gave her 3 scripts for the Adderall. She went to the pharmacy to  today , was told the last script is  by 90 days. New script is needed.

## 2020-09-02 ENCOUNTER — PATIENT MESSAGE (OUTPATIENT)
Dept: FAMILY MEDICINE CLINIC | Facility: CLINIC | Age: 45
End: 2020-09-02

## 2020-09-03 NOTE — TELEPHONE ENCOUNTER
From: Gail Colin  To: Grace Gasca MD  Sent: 9/2/2020 4:44 PM CDT  Subject: Other    Hi! Does your office have the flu vaccine for this year yet? If so, can I schedule a tech visit to get the flu vaccine as well as TB injection?   =)

## 2020-09-07 ENCOUNTER — PATIENT MESSAGE (OUTPATIENT)
Dept: FAMILY MEDICINE CLINIC | Facility: CLINIC | Age: 45
End: 2020-09-07

## 2020-09-08 NOTE — TELEPHONE ENCOUNTER
From: Kobe Velazquez  To: Snehal Cantrell MD  Sent: 9/7/2020 9:39 PM CDT  Subject: Other    Hi! Ignore my request for the Tdap paperwork. ... I found it!!! Woohoo!

## 2020-09-08 NOTE — TELEPHONE ENCOUNTER
From: Gail Colin  To: Grace Gasca MD  Sent: 9/7/2020 2:58 PM CDT  Subject: Visit Follow-up Question    Hi,  Can you forward me a copy of the Tdap records that includes lot # of vaccine given?    I received a printout the day I got the shot

## 2020-09-24 DIAGNOSIS — G43.009 MIGRAINE WITHOUT AURA AND WITHOUT STATUS MIGRAINOSUS, NOT INTRACTABLE: ICD-10-CM

## 2020-09-24 RX ORDER — RIZATRIPTAN BENZOATE 10 MG/1
10 TABLET ORAL DAILY PRN
Qty: 8 TABLET | Refills: 0 | OUTPATIENT
Start: 2020-09-24

## 2020-09-24 RX ORDER — RIZATRIPTAN BENZOATE 10 MG/1
TABLET ORAL
Qty: 8 TABLET | Refills: 0 | Status: SHIPPED | OUTPATIENT
Start: 2020-09-24 | End: 2020-10-09

## 2020-09-24 NOTE — TELEPHONE ENCOUNTER
LOV 5/1/20    LAST LAB 9/3/2019     LAST RX   RIZATRIPTAN BENZOATE 10 MG Oral Tab 8 tablet 0 8/7/2020    Sig:   TAKE 1 TABLET DAILY AS     NEEDED FOR MIGRAINE     Route:   (none)           Next OV No future appointments.     PROTOCOL NONE        Return in a

## 2020-09-24 NOTE — TELEPHONE ENCOUNTER
LOV 5/1/2020    LAST LAB    LAST RX 8/7/2020 8 tablet 0 refills    Next OV No future appointments.       PROTOCOL n/a    Name from pharmacy: RIZATRIPTAN  TAB 10MG          Will file in chart as: RIZATRIPTAN BENZOATE 10 MG Oral Tab     Possible duplicate: Juliann Honeycutt

## 2020-10-09 ENCOUNTER — OFFICE VISIT (OUTPATIENT)
Dept: FAMILY MEDICINE CLINIC | Facility: CLINIC | Age: 45
End: 2020-10-09
Payer: COMMERCIAL

## 2020-10-09 ENCOUNTER — LAB ENCOUNTER (OUTPATIENT)
Dept: LAB | Age: 45
End: 2020-10-09
Attending: FAMILY MEDICINE
Payer: COMMERCIAL

## 2020-10-09 VITALS
OXYGEN SATURATION: 98 % | RESPIRATION RATE: 18 BRPM | SYSTOLIC BLOOD PRESSURE: 120 MMHG | BODY MASS INDEX: 27 KG/M2 | HEIGHT: 66.5 IN | DIASTOLIC BLOOD PRESSURE: 78 MMHG | TEMPERATURE: 97 F | WEIGHT: 170 LBS | HEART RATE: 70 BPM

## 2020-10-09 DIAGNOSIS — F98.8 ATTENTION DEFICIT DISORDER (ADD) WITHOUT HYPERACTIVITY: ICD-10-CM

## 2020-10-09 DIAGNOSIS — E03.9 HYPOTHYROIDISM, UNSPECIFIED TYPE: ICD-10-CM

## 2020-10-09 DIAGNOSIS — B00.1 RECURRENT COLD SORES: ICD-10-CM

## 2020-10-09 DIAGNOSIS — G43.009 MIGRAINE WITHOUT AURA AND WITHOUT STATUS MIGRAINOSUS, NOT INTRACTABLE: Primary | ICD-10-CM

## 2020-10-09 DIAGNOSIS — Z13.220 SCREENING FOR LIPOID DISORDERS: ICD-10-CM

## 2020-10-09 DIAGNOSIS — Z01.84 IMMUNITY STATUS TESTING: ICD-10-CM

## 2020-10-09 DIAGNOSIS — Z13.0 SCREENING FOR DEFICIENCY ANEMIA: ICD-10-CM

## 2020-10-09 DIAGNOSIS — Z13.1 SCREENING FOR DIABETES MELLITUS: ICD-10-CM

## 2020-10-09 PROCEDURE — 3078F DIAST BP <80 MM HG: CPT | Performed by: FAMILY MEDICINE

## 2020-10-09 PROCEDURE — 99214 OFFICE O/P EST MOD 30 MIN: CPT | Performed by: FAMILY MEDICINE

## 2020-10-09 PROCEDURE — 86762 RUBELLA ANTIBODY: CPT | Performed by: FAMILY MEDICINE

## 2020-10-09 PROCEDURE — 3074F SYST BP LT 130 MM HG: CPT | Performed by: FAMILY MEDICINE

## 2020-10-09 PROCEDURE — 3008F BODY MASS INDEX DOCD: CPT | Performed by: FAMILY MEDICINE

## 2020-10-09 RX ORDER — RIZATRIPTAN BENZOATE 10 MG/1
10 TABLET ORAL DAILY PRN
Qty: 12 TABLET | Refills: 3 | Status: SHIPPED | OUTPATIENT
Start: 2020-10-09 | End: 2020-12-04

## 2020-10-09 RX ORDER — DEXTROAMPHETAMINE SACCHARATE, AMPHETAMINE ASPARTATE, DEXTROAMPHETAMINE SULFATE AND AMPHETAMINE SULFATE 7.5; 7.5; 7.5; 7.5 MG/1; MG/1; MG/1; MG/1
30 TABLET ORAL 3 TIMES DAILY
Qty: 90 TABLET | Refills: 0 | Status: SHIPPED | OUTPATIENT
Start: 2020-11-09 | End: 2020-12-09

## 2020-10-09 RX ORDER — DEXTROAMPHETAMINE SACCHARATE, AMPHETAMINE ASPARTATE, DEXTROAMPHETAMINE SULFATE AND AMPHETAMINE SULFATE 7.5; 7.5; 7.5; 7.5 MG/1; MG/1; MG/1; MG/1
30 TABLET ORAL 3 TIMES DAILY
Qty: 90 TABLET | Refills: 0 | Status: CANCELLED | OUTPATIENT
Start: 2020-10-09 | End: 2020-11-08

## 2020-10-09 RX ORDER — DEXTROAMPHETAMINE SACCHARATE, AMPHETAMINE ASPARTATE, DEXTROAMPHETAMINE SULFATE AND AMPHETAMINE SULFATE 7.5; 7.5; 7.5; 7.5 MG/1; MG/1; MG/1; MG/1
30 TABLET ORAL 3 TIMES DAILY
Qty: 90 TABLET | Refills: 0 | Status: SHIPPED | OUTPATIENT
Start: 2020-10-09 | End: 2021-02-02

## 2020-10-09 RX ORDER — FAMCICLOVIR 250 MG/1
250 TABLET, FILM COATED ORAL 2 TIMES DAILY
Qty: 90 TABLET | Refills: 3 | Status: SHIPPED | OUTPATIENT
Start: 2020-10-09 | End: 2021-08-11

## 2020-10-09 RX ORDER — DEXTROAMPHETAMINE SACCHARATE, AMPHETAMINE ASPARTATE, DEXTROAMPHETAMINE SULFATE AND AMPHETAMINE SULFATE 7.5; 7.5; 7.5; 7.5 MG/1; MG/1; MG/1; MG/1
30 TABLET ORAL 3 TIMES DAILY
Qty: 90 TABLET | Refills: 0 | Status: SHIPPED | OUTPATIENT
Start: 2020-12-10 | End: 2021-01-09

## 2020-10-09 NOTE — PROGRESS NOTES
Tiffany Guo is a 41year old female. Patient presents with:  Migraine  ADD: refill    HPI:   Tiffany Guo is a 41year old female seen for follow up and medication refill.     ADD: doing well on the current dose os medication, focus has been g hrs x 1 day with onset of symptoms.  14 tablet 2      Past Medical History:   Diagnosis Date   • ADD (attention deficit disorder)    • Allergic rhinitis    • Chronic migraine without aura, without mention of intractable migraine without mention of status mi (ADDERALL) 30 MG Oral Tab; Take 1 tablet (30 mg total) by mouth 3 (three) times daily. -     amphetamine-dextroamphetamine (ADDERALL) 30 MG Oral Tab; Take 1 tablet (30 mg total) by mouth 3 (three) times daily.     Immunity status testing  -     RUBELLA, IG

## 2020-10-16 DIAGNOSIS — E03.9 HYPOTHYROIDISM, UNSPECIFIED TYPE: ICD-10-CM

## 2020-10-16 RX ORDER — LEVOTHYROXINE SODIUM 25 MCG
TABLET ORAL
Qty: 30 TABLET | Refills: 0 | Status: SHIPPED | OUTPATIENT
Start: 2020-10-16 | End: 2020-11-17

## 2020-10-16 NOTE — TELEPHONE ENCOUNTER
Refilled #30, patient was told at her appointment to get her labs done and schedule an appointment for her physical and medication refill.

## 2020-11-15 DIAGNOSIS — E03.9 HYPOTHYROIDISM, UNSPECIFIED TYPE: ICD-10-CM

## 2020-11-16 NOTE — TELEPHONE ENCOUNTER
LOV 10/9/2020    LAST LAB 9-3-19    LAST RX 10-16-20 30*0    Next OV No future appointments.     PROTOCOL    Name from pharmacy: SYNTHROID TAB 0.025MG          Will file in chart as: SYNTHROID 25 MCG Oral Tab    Sig: TAKE 1 TABLET DAILY    Disp:  30 tablet

## 2020-11-17 RX ORDER — LEVOTHYROXINE SODIUM 25 MCG
TABLET ORAL
Qty: 30 TABLET | Refills: 0 | Status: SHIPPED | OUTPATIENT
Start: 2020-11-17 | End: 2021-05-17

## 2020-12-04 DIAGNOSIS — G43.009 MIGRAINE WITHOUT AURA AND WITHOUT STATUS MIGRAINOSUS, NOT INTRACTABLE: ICD-10-CM

## 2020-12-04 RX ORDER — RIZATRIPTAN BENZOATE 10 MG/1
10 TABLET ORAL DAILY PRN
Qty: 12 TABLET | Refills: 3 | Status: CANCELLED | OUTPATIENT
Start: 2020-12-04

## 2020-12-04 NOTE — TELEPHONE ENCOUNTER
Dr. Abram Omer,  Please advise    Since 1/15/2020 patient's rx has been from 8-24 tablets at a time. ` Per patient she has 12-13 migraine headaches per month.   Rx has recently been sent to mail order Pharmacy and they have only been giving 12 tablets for a 90

## 2020-12-04 NOTE — TELEPHONE ENCOUNTER
Patient stated that Dr. Toña Garcia adjusted her migraine medication and now the quantity seems to be wrong. She is only getting 12 pills every 3 months.     She has not been able to get medication since Oct.  She stated she has a migraine today,

## 2020-12-05 ENCOUNTER — PATIENT MESSAGE (OUTPATIENT)
Dept: FAMILY MEDICINE CLINIC | Facility: CLINIC | Age: 45
End: 2020-12-05

## 2020-12-06 RX ORDER — RIZATRIPTAN BENZOATE 10 MG/1
10 TABLET ORAL DAILY PRN
Qty: 8 TABLET | Refills: 0 | Status: SHIPPED | OUTPATIENT
Start: 2020-12-06 | End: 2021-02-02

## 2020-12-06 NOTE — TELEPHONE ENCOUNTER
If patient is getting 12-13 migraines each month needs to see a neurologist, please refer to [address].  [address] is only given at the neurologist office, she can also discuss alternative treatments like botox, taking a triptan that frequently is not recom

## 2020-12-07 NOTE — TELEPHONE ENCOUNTER
From: Sol Dakins  To: Jamil Aldana MD  Sent: 12/5/2020 12:11 PM CST  Subject: Prescription Question    Hello - can someone please address my refill request & NOT just deny it without explanation.   I spoke with a very nice RN on Friday who said

## 2020-12-07 NOTE — TELEPHONE ENCOUNTER
Detailed VMML for patient with recommendations per Dr. Karen Martin note. Advised partial rx has been sent. Contact information given for Dr. Dorcas Lundy. Referral order to Dr. Dorcas Lundy placed.

## 2020-12-07 NOTE — TELEPHONE ENCOUNTER
Spoke to patient and reviewed message from Dr. Mirian Cohen. If she is having 12-13 migraine headaches per month she needs to see a Neurologist to help us provide a better POC to control them.   Offered a Video Visit to discuss this with Dr. Mirian Cohen, declines

## 2020-12-31 ENCOUNTER — PATIENT MESSAGE (OUTPATIENT)
Dept: FAMILY MEDICINE CLINIC | Facility: CLINIC | Age: 45
End: 2020-12-31

## 2021-01-04 NOTE — TELEPHONE ENCOUNTER
From: Venancio Lutz  To: Brent Up MD  Sent: 12/31/2020 3:58 PM CST  Subject: Referral Request    Hi. .. I know you recommended I see a neurologist for my migraines, but I can’t for the life of me, find the dr’s name & number!   Can u please sen

## 2021-02-02 ENCOUNTER — TELEMEDICINE (OUTPATIENT)
Dept: FAMILY MEDICINE CLINIC | Facility: CLINIC | Age: 46
End: 2021-02-02
Payer: COMMERCIAL

## 2021-02-02 DIAGNOSIS — Z51.81 ENCOUNTER FOR THERAPEUTIC DRUG MONITORING: Primary | ICD-10-CM

## 2021-02-02 DIAGNOSIS — G43.009 MIGRAINE WITHOUT AURA AND WITHOUT STATUS MIGRAINOSUS, NOT INTRACTABLE: ICD-10-CM

## 2021-02-02 DIAGNOSIS — R61 HYPERHIDROSIS: ICD-10-CM

## 2021-02-02 DIAGNOSIS — E03.9 HYPOTHYROIDISM, UNSPECIFIED TYPE: ICD-10-CM

## 2021-02-02 DIAGNOSIS — F98.8 ATTENTION DEFICIT DISORDER (ADD) WITHOUT HYPERACTIVITY: ICD-10-CM

## 2021-02-02 DIAGNOSIS — F41.1 GAD (GENERALIZED ANXIETY DISORDER): ICD-10-CM

## 2021-02-02 PROCEDURE — 99213 OFFICE O/P EST LOW 20 MIN: CPT | Performed by: FAMILY MEDICINE

## 2021-02-02 RX ORDER — DEXTROAMPHETAMINE SACCHARATE, AMPHETAMINE ASPARTATE, DEXTROAMPHETAMINE SULFATE AND AMPHETAMINE SULFATE 7.5; 7.5; 7.5; 7.5 MG/1; MG/1; MG/1; MG/1
30 TABLET ORAL 3 TIMES DAILY
Qty: 90 TABLET | Refills: 0 | Status: SHIPPED | OUTPATIENT
Start: 2021-02-02 | End: 2021-02-15

## 2021-02-02 RX ORDER — DEXTROAMPHETAMINE SACCHARATE, AMPHETAMINE ASPARTATE, DEXTROAMPHETAMINE SULFATE AND AMPHETAMINE SULFATE 7.5; 7.5; 7.5; 7.5 MG/1; MG/1; MG/1; MG/1
30 TABLET ORAL 3 TIMES DAILY
Qty: 90 TABLET | Refills: 0 | Status: SHIPPED | OUTPATIENT
Start: 2021-04-05 | End: 2021-05-10

## 2021-02-02 RX ORDER — OXYBUTYNIN CHLORIDE 10 MG/1
1 TABLET, EXTENDED RELEASE ORAL DAILY
COMMUNITY
Start: 2020-11-16 | End: 2021-06-25

## 2021-02-02 RX ORDER — ESCITALOPRAM OXALATE 20 MG/1
1 TABLET ORAL DAILY
COMMUNITY
Start: 2020-11-26

## 2021-02-02 RX ORDER — RIZATRIPTAN BENZOATE 10 MG/1
10 TABLET ORAL DAILY PRN
Qty: 8 TABLET | Refills: 2 | Status: SHIPPED | OUTPATIENT
Start: 2021-02-02 | End: 2021-02-15

## 2021-02-02 RX ORDER — DEXTROAMPHETAMINE SACCHARATE, AMPHETAMINE ASPARTATE, DEXTROAMPHETAMINE SULFATE AND AMPHETAMINE SULFATE 7.5; 7.5; 7.5; 7.5 MG/1; MG/1; MG/1; MG/1
30 TABLET ORAL 3 TIMES DAILY
Qty: 90 TABLET | Refills: 0 | Status: SHIPPED | OUTPATIENT
Start: 2021-03-05 | End: 2021-02-15

## 2021-02-02 NOTE — PROGRESS NOTES
Freddy Us is a 41year old female. Patient presents with:  Medication Follow-Up: ADD, migraine    This visit is conducted using telemedicine with live interactive video and audio. Freddy Us verbally consents to virtual video visit.    Pa 2      Past Medical History:   Diagnosis Date   • ADD (attention deficit disorder)    • Allergic rhinitis    • Chronic migraine without aura, without mention of intractable migraine without mention of status migrainosus    • Contact with or exposure to tub completed using two-way, real-time interactive audio and/or video communication.   This has been done in good marissa to provide continuity of care in the best interest of the provider-patient relationship, due to the ongoing public health crisis/national finesse

## 2021-02-15 ENCOUNTER — OFFICE VISIT (OUTPATIENT)
Dept: NEUROLOGY | Facility: CLINIC | Age: 46
End: 2021-02-15
Payer: COMMERCIAL

## 2021-02-15 VITALS
SYSTOLIC BLOOD PRESSURE: 124 MMHG | RESPIRATION RATE: 16 BRPM | DIASTOLIC BLOOD PRESSURE: 70 MMHG | WEIGHT: 163 LBS | BODY MASS INDEX: 26 KG/M2 | HEART RATE: 68 BPM

## 2021-02-15 DIAGNOSIS — F33.1 MODERATE EPISODE OF RECURRENT MAJOR DEPRESSIVE DISORDER (HCC): ICD-10-CM

## 2021-02-15 DIAGNOSIS — G43.009 MIGRAINE WITHOUT AURA AND WITHOUT STATUS MIGRAINOSUS, NOT INTRACTABLE: ICD-10-CM

## 2021-02-15 DIAGNOSIS — G43.019 INTRACTABLE MIGRAINE WITHOUT AURA AND WITHOUT STATUS MIGRAINOSUS: Primary | ICD-10-CM

## 2021-02-15 PROCEDURE — 99204 OFFICE O/P NEW MOD 45 MIN: CPT | Performed by: OTHER

## 2021-02-15 PROCEDURE — 3078F DIAST BP <80 MM HG: CPT | Performed by: OTHER

## 2021-02-15 PROCEDURE — 3074F SYST BP LT 130 MM HG: CPT | Performed by: OTHER

## 2021-02-15 RX ORDER — TOPIRAMATE 25 MG/1
TABLET ORAL
Qty: 60 TABLET | Refills: 5 | Status: SHIPPED | OUTPATIENT
Start: 2021-02-15 | End: 2021-05-17

## 2021-02-15 RX ORDER — RIZATRIPTAN BENZOATE 10 MG/1
10 TABLET ORAL DAILY PRN
Qty: 12 TABLET | Refills: 11 | Status: SHIPPED | OUTPATIENT
Start: 2021-02-15 | End: 2022-01-04

## 2021-02-15 NOTE — PROGRESS NOTES
[facility]   Neurology; INITIAL CLINIC VISIT  2/15/2021, 11:11 AM     Jessica Thomson Patient Status:  No patient class for patient encounter    1975 MRN [id number]   Location [facility], [address] [place] [address],  FAMILY HISTORY:  family history includes Breast Cancer in her maternal grandmother; CAD in her father and maternal grandfather; CHF in her paternal grandmother; Cancer in her paternal grandmother; Heart Disorder in her father; Hypertension in her fat appetite,   EYES: no visual complaints or deficits  HEENT: denies nasal congestion, sinus pain or sore throat; no  hearing loss;  RESPIRATORY: denies shortness of breath, wheezing or cough   CARDIOVASCULAR: denies chest pain, no palpitations   GI: denies n CN V: Masseters strong, Facial sensations normal,        CN  VII:  Symmetric facial movement       CN VIII:  Normal hearing       CN IX, XI:  Normal Gag, uvula palate midline       CN XII:  SCM strong, equal shoulder shrug  Motor:  No drift, rapid finger questions. All questions and concerns were addressed to satisfactory status. The patient was instructed to go to local ER if current symptoms worse or significant new symptoms arise. They understood my instruction.      Nita Lombardi MD  General Neurology   Ne

## 2021-02-15 NOTE — PROGRESS NOTES
Patient states multiple migraines a week. Patient states majority occurring in the frontal region and in the occipital region. Denies vision changes, balance issues or memory. Denies facial numbness or tingling. Patient states pain behind the eyes.  Patient

## 2021-02-22 ENCOUNTER — PATIENT MESSAGE (OUTPATIENT)
Dept: NEUROLOGY | Facility: CLINIC | Age: 46
End: 2021-02-22

## 2021-02-23 NOTE — TELEPHONE ENCOUNTER
Only take 25 mg at bedtime, may change to early evening, I do not feel she should take other medication to counteract side effect of fatigue, it should subside after few weeks.

## 2021-02-23 NOTE — TELEPHONE ENCOUNTER
From: Gail Colin  To: Bob Mast MD  Sent: 2/22/2021 9:04 PM CST  Subject: Visit Follow-up Question    Dr. Sterling Ross,  I have been taking the topiramate 25 MG since you prescribed it on the 15th for maintenance to reduce the # of my migraines.   Unfo

## 2021-03-15 DIAGNOSIS — Z23 NEED FOR VACCINATION: ICD-10-CM

## 2021-03-22 NOTE — TELEPHONE ENCOUNTER
LOV 10/9/2020    LAST LAB 10-23-17    LAST RX  1-2-21    Next OV   Future Appointments   Date Time Provider Julian Amaya   5/17/2021  3:00 PM Edwin Zepeda MD [facility]         PROTOCOL  Name from pharmacy: Saúl Prakash TAB 3-0.02MG          Will fi

## 2021-04-27 ENCOUNTER — TELEPHONE (OUTPATIENT)
Dept: FAMILY MEDICINE CLINIC | Facility: CLINIC | Age: 46
End: 2021-04-27

## 2021-04-29 DIAGNOSIS — B00.1 RECURRENT COLD SORES: ICD-10-CM

## 2021-04-29 RX ORDER — FAMCICLOVIR 250 MG/1
TABLET, FILM COATED ORAL
Qty: 90 TABLET | Refills: 3 | OUTPATIENT
Start: 2021-04-29

## 2021-04-29 NOTE — TELEPHONE ENCOUNTER
LOV   10/9/2020    LAST LAB    LAST RX    Next OV   Future Appointments   Date Time Provider Julian Amaya   5/17/2021  3:00 PM Suraj Garnett MD [facility]         PROTOCOL   Name from pharmacy: FAMCICLOVIR  TAB 250MG         Will file in chart

## 2021-05-04 DIAGNOSIS — F98.8 ATTENTION DEFICIT DISORDER (ADD) WITHOUT HYPERACTIVITY: ICD-10-CM

## 2021-05-04 NOTE — TELEPHONE ENCOUNTER
LOV 2/2/2021    LAST LAB 9/03/2019    LAST RX   amphetamine-dextroamphetamine (ADDERALL) 30 MG Oral Tab 90 tablet 0 4/5/2021 5/5/2021   Sig:   Take 1 tablet (30 mg total) by mouth 3 (three) times daily.      Route:   Oral           Next OV   Future Appointm

## 2021-05-06 RX ORDER — DEXTROAMPHETAMINE SACCHARATE, AMPHETAMINE ASPARTATE, DEXTROAMPHETAMINE SULFATE AND AMPHETAMINE SULFATE 7.5; 7.5; 7.5; 7.5 MG/1; MG/1; MG/1; MG/1
30 TABLET ORAL 3 TIMES DAILY
Qty: 90 TABLET | Refills: 0 | OUTPATIENT
Start: 2021-05-06 | End: 2021-06-05

## 2021-05-07 ENCOUNTER — TELEPHONE (OUTPATIENT)
Dept: FAMILY MEDICINE CLINIC | Facility: CLINIC | Age: 46
End: 2021-05-07

## 2021-05-07 NOTE — TELEPHONE ENCOUNTER
Orlin Wilkins    11:17 AM  Note     Spoke to pharmacy - pt has one more refill left. Last picked up on 3/23/2021.

## 2021-05-07 NOTE — TELEPHONE ENCOUNTER
VM left for Pt asking if she got her medication issue resolved.   As per the MA Pt had a refill left

## 2021-05-10 DIAGNOSIS — F98.8 ATTENTION DEFICIT DISORDER (ADD) WITHOUT HYPERACTIVITY: ICD-10-CM

## 2021-05-10 RX ORDER — DEXTROAMPHETAMINE SACCHARATE, AMPHETAMINE ASPARTATE, DEXTROAMPHETAMINE SULFATE AND AMPHETAMINE SULFATE 7.5; 7.5; 7.5; 7.5 MG/1; MG/1; MG/1; MG/1
30 TABLET ORAL 3 TIMES DAILY
Qty: 90 TABLET | Refills: 0 | Status: SHIPPED | OUTPATIENT
Start: 2021-05-10 | End: 2021-06-25

## 2021-05-10 NOTE — TELEPHONE ENCOUNTER
Last refill 4/5/2021  Last office visit pertaining to refill on 10/9/2020  Future Appointments   Date Time Provider Julian Amaya   5/17/2021  3:00 PM Jenise Sacks, MD [facility]

## 2021-05-10 NOTE — TELEPHONE ENCOUNTER
Refilled for 1 month, I need to see her for further refills, please have her schedule a follow up appointment.

## 2021-05-10 NOTE — TELEPHONE ENCOUNTER
Refill Adderall to [facility] on [address] in [place]. Current script on file has now  (from February for March, April and May)     Needs new script sent for 30 day supply for month of May.     Then will need additional scripts sent for months

## 2021-05-17 ENCOUNTER — OFFICE VISIT (OUTPATIENT)
Dept: NEUROLOGY | Facility: CLINIC | Age: 46
End: 2021-05-17
Payer: COMMERCIAL

## 2021-05-17 VITALS
RESPIRATION RATE: 16 BRPM | WEIGHT: 174 LBS | SYSTOLIC BLOOD PRESSURE: 108 MMHG | HEART RATE: 84 BPM | BODY MASS INDEX: 28 KG/M2 | DIASTOLIC BLOOD PRESSURE: 76 MMHG

## 2021-05-17 DIAGNOSIS — R53.82 CHRONIC FATIGUE: ICD-10-CM

## 2021-05-17 DIAGNOSIS — G43.019 INTRACTABLE MIGRAINE WITHOUT AURA AND WITHOUT STATUS MIGRAINOSUS: Primary | ICD-10-CM

## 2021-05-17 PROCEDURE — 3074F SYST BP LT 130 MM HG: CPT | Performed by: OTHER

## 2021-05-17 PROCEDURE — 3078F DIAST BP <80 MM HG: CPT | Performed by: OTHER

## 2021-05-17 PROCEDURE — 99214 OFFICE O/P EST MOD 30 MIN: CPT | Performed by: OTHER

## 2021-05-17 RX ORDER — TOPIRAMATE 25 MG/1
25 TABLET ORAL DAILY
Qty: 30 TABLET | Refills: 5 | Status: SHIPPED | OUTPATIENT
Start: 2021-05-17 | End: 2021-06-25 | Stop reason: ALTCHOICE

## 2021-05-17 RX ORDER — ERENUMAB-AOOE 70 MG/ML
70 INJECTION SUBCUTANEOUS
Qty: 1 ML | Refills: 2 | Status: SHIPPED | OUTPATIENT
Start: 2021-05-17

## 2021-05-17 NOTE — PROGRESS NOTES
Per Donnie Lindo from Dr. Keshia Hatch, patient to start on [facility] for migraines. Rx entered. RN demonstrated injection technique to patient using Demonstration Kit. Patient given [employer].   Patient instructed to sign up for Aimovig Access Card to get fr

## 2021-05-17 NOTE — PROGRESS NOTES
[facility]   Neurology; follow up  CLINIC VISIT  2021    Kobe Velazquez Patient Status:  No patient class for patient encounter    1975 MRN [id number]   Location [address] [place] [address] did not help her HA, she has at least 12-15 HA a months, she has five major migraine since May,  She is very frustrated.        PAST MEDICAL HISTORY:  Past Medical History:   Diagnosis Date   • ADD (attention deficit disorder)    • Allergic rhinitis    • Ch 250 MG Oral Tab Take 1 tablet (250 mg total) by mouth 2 (two) times daily. 90 tablet 3   • cetirizine 10 MG Oral Tab Take 1 tablet (10 mg total) by mouth daily.  30 tablet 6   • Azelastine HCl 0.1 % Nasal Solution 2 sprays by Nasal route 2 (two) times daily normal SR, no murmur  Extremities:  No edema or cyanosis, radial and pedal pulse is normal.  Skin:  No unusual lesions    Neurologic Examination:  Mental status: he is awake, alert, oriented to time, name and place, Mentally appropriate  for age;  Language sicshannen she is on lexapro,     Will give her Aimovig, monthly injection, education and side effect was discussed, and script is given.    She agreed to try,   Keep Topamax 25 mg at bedtime for HA prevention,   Maxalt PRN, side effect was discussed,   No medic

## 2021-06-08 ENCOUNTER — TELEPHONE (OUTPATIENT)
Dept: FAMILY MEDICINE CLINIC | Facility: CLINIC | Age: 46
End: 2021-06-08

## 2021-06-08 NOTE — TELEPHONE ENCOUNTER
Patient called to schedule a med f/u. She stated she needs to do this before her current job and insurance end on 6/11. Offered 10:00 on 6/11. Patient stated that was too late. Please advise where patient can be scheduled this week.

## 2021-06-14 NOTE — TELEPHONE ENCOUNTER
LOV 10/9/2020    LAST LAB 10-23-17    LAST RX 3-23-21 90*0    Next OV   Future Appointments   Date Time Provider Julian Amaya   8/23/2021  2:00 PM Jenise Sacks, MD [facility]         PROTOCOL  Name from pharmacy: Hasmukh Cagle TAB 3-0.02MG          Ju Mcgarry

## 2021-06-15 NOTE — TELEPHONE ENCOUNTER
refilled medication, patient is due for her pap and annual and needs to schedule an appointment for further refills.

## 2021-06-18 DIAGNOSIS — B00.1 RECURRENT COLD SORES: ICD-10-CM

## 2021-06-25 NOTE — PROGRESS NOTES
Jose Waggoner is a 40year old female. Patient presents with:  Medication Follow-Up    HPI:   Jose Waggoner is a 40year old female complaining of  rash on arms and legs for 6 months, states has itching in her hands and feet.     Switchin (three) times daily. 90 tablet 0   • Rizatriptan Benzoate 10 MG Oral Tab Take 1 tablet (10 mg total) by mouth daily as needed for Migraine (1 tablet at onset and repeat another dose in 2 hrs).  12 tablet 11   • escitalopram 20 MG Oral Tab Take 1 tablet by m hyperactivity  -     amphetamine-dextroamphetamine (ADDERALL) 30 MG Oral Tab; Take 1 tablet (30 mg total) by mouth 3 (three) times daily. -     amphetamine-dextroamphetamine (ADDERALL) 30 MG Oral Tab;  Take 1 tablet (30 mg total) by mouth 3 (three) times d

## 2021-06-26 RX ORDER — FAMCICLOVIR 250 MG/1
TABLET ORAL
Qty: 90 TABLET | Refills: 3 | OUTPATIENT
Start: 2021-06-26

## 2021-07-19 ENCOUNTER — PATIENT MESSAGE (OUTPATIENT)
Dept: FAMILY MEDICINE CLINIC | Facility: CLINIC | Age: 46
End: 2021-07-19

## 2021-07-19 DIAGNOSIS — M62.838 NECK MUSCLE SPASM: Primary | ICD-10-CM

## 2021-07-19 RX ORDER — CYCLOBENZAPRINE HCL 10 MG
10 TABLET ORAL NIGHTLY
Qty: 7 TABLET | Refills: 0 | Status: SHIPPED | OUTPATIENT
Start: 2021-07-19 | End: 2021-07-26

## 2021-07-19 NOTE — TELEPHONE ENCOUNTER
LOV 06-25-21. Neck/shoulder pain x 3 weeks. Using heat, Advil and massage. Would you like to prescribe muscle relaxer or order imaging?

## 2021-07-19 NOTE — TELEPHONE ENCOUNTER
From: Santana Villalobos  To: Lashell Reyna MD  Sent: 7/19/2021 12:00 AM CDT  Subject: Prescription Question    During one of our visits, years ago, I explained how I recently had really bad neck/shoulder pain.  When I said I had just mustered throu

## 2021-07-19 NOTE — TELEPHONE ENCOUNTER
Prescription for cyclobenzaprine sent for 7 days, please advise patient can make her drowsy, if her pain is not better to follow up.

## 2021-07-23 ENCOUNTER — TELEPHONE (OUTPATIENT)
Dept: FAMILY MEDICINE CLINIC | Facility: CLINIC | Age: 46
End: 2021-07-23

## 2021-07-23 NOTE — TELEPHONE ENCOUNTER
Needs to be seen, she will not get any appointments over the weekend, please schedule her with me on Monday at 10:20 am if she can come in.

## 2021-07-23 NOTE — TELEPHONE ENCOUNTER
Shoulder pain is worse, muscle relaxers are not helping. Wondering if she needs an MRI? She is trying to avoid going to ED.    Please advise

## 2021-07-23 NOTE — TELEPHONE ENCOUNTER
LOV 06-25-21    Started muscle relaxer on 07-19-21. Pain in right shoulder, radiates down to scapula, occasional tingling to fingers, taking ibuprofen 600 mg tid, Tylenol for breakthrough pain and Flexeril at night.      Insurance does not cover immediate c

## 2021-07-27 ENCOUNTER — OFFICE VISIT (OUTPATIENT)
Dept: FAMILY MEDICINE CLINIC | Facility: CLINIC | Age: 46
End: 2021-07-27
Payer: COMMERCIAL

## 2021-07-27 VITALS
WEIGHT: 176 LBS | HEIGHT: 66.5 IN | TEMPERATURE: 97 F | BODY MASS INDEX: 27.95 KG/M2 | HEART RATE: 93 BPM | RESPIRATION RATE: 18 BRPM | SYSTOLIC BLOOD PRESSURE: 110 MMHG | OXYGEN SATURATION: 98 % | DIASTOLIC BLOOD PRESSURE: 78 MMHG

## 2021-07-27 DIAGNOSIS — M54.6 ACUTE RIGHT-SIDED THORACIC BACK PAIN: Primary | ICD-10-CM

## 2021-07-27 DIAGNOSIS — M62.830 SPASM OF MUSCLE, BACK: ICD-10-CM

## 2021-07-27 DIAGNOSIS — Z12.31 ENCOUNTER FOR SCREENING MAMMOGRAM FOR MALIGNANT NEOPLASM OF BREAST: ICD-10-CM

## 2021-07-27 DIAGNOSIS — M62.838 NECK MUSCLE SPASM: ICD-10-CM

## 2021-07-27 PROCEDURE — 3078F DIAST BP <80 MM HG: CPT | Performed by: FAMILY MEDICINE

## 2021-07-27 PROCEDURE — 3074F SYST BP LT 130 MM HG: CPT | Performed by: FAMILY MEDICINE

## 2021-07-27 PROCEDURE — 3008F BODY MASS INDEX DOCD: CPT | Performed by: FAMILY MEDICINE

## 2021-07-27 PROCEDURE — 99213 OFFICE O/P EST LOW 20 MIN: CPT | Performed by: FAMILY MEDICINE

## 2021-07-27 RX ORDER — METHYLPREDNISOLONE 4 MG/1
TABLET ORAL
Qty: 21 EACH | Refills: 0 | Status: SHIPPED | OUTPATIENT
Start: 2021-07-27 | End: 2021-08-02

## 2021-07-27 RX ORDER — DIAZEPAM 5 MG/1
5 TABLET ORAL NIGHTLY PRN
Qty: 10 TABLET | Refills: 0 | Status: SHIPPED | OUTPATIENT
Start: 2021-07-27 | End: 2021-08-02 | Stop reason: ALTCHOICE

## 2021-07-27 NOTE — PROGRESS NOTES
Rhonda Sahu is a 40year old female.   Patient presents with:  Shoulder Pain  Neck Pain    HPI:   Rhonda Sahu is a 40year old female with history of depression and ADD complaining of pin in the right upper back radiating to her neck f Take 1 tablet by mouth daily. • Famciclovir (FAMVIR) 250 MG Oral Tab Take 1 tablet (250 mg total) by mouth 2 (two) times daily. 90 tablet 3   • cetirizine 10 MG Oral Tab Take 1 tablet (10 mg total) by mouth daily.  30 tablet 6   • Rizatriptan Benzoate 1 1 tablet (5 mg total) by mouth nightly as needed for Anxiety. Neck muscle spasm    Encounter for screening mammogram for malignant neoplasm of breast  -     JONAS SCREENING BILAT (CPT=77067);  Future    Advised PT, patient is traveling for work and would l

## 2021-08-02 ENCOUNTER — OFFICE VISIT (OUTPATIENT)
Dept: FAMILY MEDICINE CLINIC | Facility: CLINIC | Age: 46
End: 2021-08-02
Payer: COMMERCIAL

## 2021-08-02 VITALS
OXYGEN SATURATION: 98 % | SYSTOLIC BLOOD PRESSURE: 110 MMHG | WEIGHT: 178 LBS | HEART RATE: 76 BPM | HEIGHT: 66.5 IN | RESPIRATION RATE: 16 BRPM | DIASTOLIC BLOOD PRESSURE: 72 MMHG | BODY MASS INDEX: 28.27 KG/M2

## 2021-08-02 DIAGNOSIS — E66.3 OVERWEIGHT WITH BODY MASS INDEX (BMI) OF 28 TO 28.9 IN ADULT: Primary | ICD-10-CM

## 2021-08-02 DIAGNOSIS — Z00.00 GENERAL MEDICAL EXAM: ICD-10-CM

## 2021-08-02 PROCEDURE — 3078F DIAST BP <80 MM HG: CPT | Performed by: PHYSICIAN ASSISTANT

## 2021-08-02 PROCEDURE — 3008F BODY MASS INDEX DOCD: CPT | Performed by: PHYSICIAN ASSISTANT

## 2021-08-02 PROCEDURE — 99204 OFFICE O/P NEW MOD 45 MIN: CPT | Performed by: PHYSICIAN ASSISTANT

## 2021-08-02 PROCEDURE — 3074F SYST BP LT 130 MM HG: CPT | Performed by: PHYSICIAN ASSISTANT

## 2021-08-02 RX ORDER — PHENTERMINE HYDROCHLORIDE 37.5 MG/1
37.5 TABLET ORAL
Qty: 30 TABLET | Refills: 2 | Status: SHIPPED | OUTPATIENT
Start: 2021-08-02 | End: 2021-11-26

## 2021-08-02 NOTE — PROGRESS NOTES
HPI/Subjective:   Patient ID: Allyn Mayo is a 40year old female. HPI   Patient who is new to the clinic presents to discuss concerns with weight. She was referred by one of her friends who had success with weight loss medication.     Frann Like (GIANVI) 3-0.02 MG Oral Tab Take 1 tablet by mouth daily. 84 tablet 2   • erenumab-aooe (AIMOVIG) 70 MG/ML Subcutaneous Inject 1 mL (70 mg total) into the skin every 30 (thirty) days. 1 mL 2   • escitalopram 20 MG Oral Tab Take 1 tablet by mouth daily. 2  - CBC WITH DIFFERENTIAL WITH PLATELET; Future  - COMP METABOLIC PANEL (14); Future  - LIPID PANEL;  Future  - TSH W REFLEX TO FREE T4; Future  - HEMOGLOBIN A1C; Future  - CBC WITH DIFFERENTIAL WITH PLATELET  - COMP METABOLIC PANEL (14)  - LIPID PANEL  -

## 2021-08-06 DIAGNOSIS — B00.1 RECURRENT COLD SORES: ICD-10-CM

## 2021-08-09 NOTE — TELEPHONE ENCOUNTER
LOV 7/27/2021    LAST LAB    LAST RX 6-25-21 14*2    Next OV   Future Appointments   Date Time Provider Julian Amaya   8/23/2021  2:00 PM Jonas Lees MD [facility]         PROTOCOL  Name from pharmacy: FAMCICLOVIR 250MG TABLETS          Will

## 2021-08-11 RX ORDER — FAMCICLOVIR 250 MG/1
TABLET, FILM COATED ORAL
Qty: 90 TABLET | Refills: 3 | Status: SHIPPED | OUTPATIENT
Start: 2021-08-11 | End: 2022-01-27

## 2021-10-16 DIAGNOSIS — B00.1 RECURRENT COLD SORES: ICD-10-CM

## 2021-10-18 RX ORDER — FAMCICLOVIR 500 MG/1
TABLET, FILM COATED ORAL
Qty: 14 TABLET | Refills: 2 | OUTPATIENT
Start: 2021-10-18

## 2021-10-18 NOTE — TELEPHONE ENCOUNTER
LOV 7/27/2021    LAST LAB    LAST RX    Next OV No future appointments.     PROTOCOL  Name from pharmacy: FAMCICLOVIR 500 MG TABLET          Will file in chart as: FAMCICLOVIR 500 MG Oral Tab    Sig: TAKE TWO TABLETS BY MOUTH EVERY 12 HOURS FOR 1 DAY WITH O

## 2021-11-06 DIAGNOSIS — B00.1 RECURRENT COLD SORES: ICD-10-CM

## 2021-11-08 RX ORDER — FAMCICLOVIR 500 MG/1
TABLET, FILM COATED ORAL
Qty: 14 TABLET | Refills: 2 | OUTPATIENT
Start: 2021-11-08

## 2021-11-25 DIAGNOSIS — Z00.00 GENERAL MEDICAL EXAM: ICD-10-CM

## 2021-11-25 DIAGNOSIS — B00.1 RECURRENT COLD SORES: ICD-10-CM

## 2021-11-25 DIAGNOSIS — E66.3 OVERWEIGHT WITH BODY MASS INDEX (BMI) OF 28 TO 28.9 IN ADULT: ICD-10-CM

## 2021-11-26 RX ORDER — FAMCICLOVIR 500 MG/1
TABLET, FILM COATED ORAL
Qty: 14 TABLET | Refills: 2 | OUTPATIENT
Start: 2021-11-26

## 2021-11-26 RX ORDER — PHENTERMINE HYDROCHLORIDE 37.5 MG/1
37.5 TABLET ORAL
Qty: 30 TABLET | Refills: 0 | Status: SHIPPED | OUTPATIENT
Start: 2021-11-26 | End: 2022-06-18 | Stop reason: ALTCHOICE

## 2021-11-26 NOTE — TELEPHONE ENCOUNTER
Herpes Agent Protocol Passed 11/25/2021 03:30 PM    Appointment in the past 12 or next 3 months     LOV  7/27/21     LAST LAB n/a     LAST RX  8/11/21  250 mg dose was given     Next OV No future appointments.        PROTOCOL pass   dose was 250 mg not 500

## 2021-12-14 ENCOUNTER — PATIENT MESSAGE (OUTPATIENT)
Dept: FAMILY MEDICINE CLINIC | Facility: CLINIC | Age: 46
End: 2021-12-14

## 2021-12-14 DIAGNOSIS — G43.009 MIGRAINE WITHOUT AURA AND WITHOUT STATUS MIGRAINOSUS, NOT INTRACTABLE: ICD-10-CM

## 2021-12-16 RX ORDER — RIZATRIPTAN BENZOATE 10 MG/1
10 TABLET ORAL DAILY PRN
Qty: 12 TABLET | Refills: 11 | OUTPATIENT
Start: 2021-12-16 | End: 2022-01-15

## 2021-12-16 NOTE — TELEPHONE ENCOUNTER
From: Alcides Blackwell  To: Erica Novak MD  Sent: 12/14/2021 5:31 PM CST  Subject: Video Visit refills?     Hi Dr. Sary Be,    My new job has me traveling most days during the week, and my schedule is very last minute! (Just got back from [place]

## 2021-12-22 ENCOUNTER — TELEPHONE (OUTPATIENT)
Dept: FAMILY MEDICINE CLINIC | Facility: CLINIC | Age: 46
End: 2021-12-22

## 2021-12-22 DIAGNOSIS — F98.8 ATTENTION DEFICIT DISORDER (ADD) WITHOUT HYPERACTIVITY: ICD-10-CM

## 2021-12-22 RX ORDER — DEXTROAMPHETAMINE SACCHARATE, AMPHETAMINE ASPARTATE, DEXTROAMPHETAMINE SULFATE AND AMPHETAMINE SULFATE 7.5; 7.5; 7.5; 7.5 MG/1; MG/1; MG/1; MG/1
30 TABLET ORAL 3 TIMES DAILY
Qty: 90 TABLET | Refills: 0 | Status: SHIPPED | OUTPATIENT
Start: 2022-01-22

## 2021-12-22 RX ORDER — DEXTROAMPHETAMINE SACCHARATE, AMPHETAMINE ASPARTATE, DEXTROAMPHETAMINE SULFATE AND AMPHETAMINE SULFATE 7.5; 7.5; 7.5; 7.5 MG/1; MG/1; MG/1; MG/1
30 TABLET ORAL 3 TIMES DAILY
Qty: 90 TABLET | Refills: 0 | Status: SHIPPED | OUTPATIENT
Start: 2021-12-22

## 2021-12-22 NOTE — TELEPHONE ENCOUNTER
Refill Adderall 30mg 3 times daily   Plus additional script for January 2022. IS OUT OF MEDICATION TODAY.           Has follow up scheduled on  1/31/22    call to [address], [address] [place], [place] [address] ph: [phone]

## 2021-12-22 NOTE — TELEPHONE ENCOUNTER
LOV 6/25/21    Last refill  amphetamine-dextroamphetamine (ADDERALL) 30 MG Oral Tab (Discontinued) 90 tablet 0 8/26/2021 8/2/2021   Sig:   Take 1 tablet (30 mg total) by mouth 3 (three) times daily.          Future Appointments   Date Time Provider Departme

## 2022-01-04 DIAGNOSIS — G43.009 MIGRAINE WITHOUT AURA AND WITHOUT STATUS MIGRAINOSUS, NOT INTRACTABLE: ICD-10-CM

## 2022-01-04 RX ORDER — RIZATRIPTAN BENZOATE 10 MG/1
TABLET ORAL
Qty: 12 TABLET | Refills: 0 | Status: SHIPPED | OUTPATIENT
Start: 2022-01-04 | End: 2022-01-21

## 2022-01-04 NOTE — TELEPHONE ENCOUNTER
Sent the patient a Oyster message to make an appt for further medication refills.        Medication: RIZATRIPTAN BENZOATE 10 MG Oral Tab     Date of last refill: 02/15/2021 (#12/11)  [place]     Last office visit: 05

## 2022-01-19 DIAGNOSIS — G43.009 MIGRAINE WITHOUT AURA AND WITHOUT STATUS MIGRAINOSUS, NOT INTRACTABLE: ICD-10-CM

## 2022-01-20 ENCOUNTER — PATIENT MESSAGE (OUTPATIENT)
Dept: NEUROLOGY | Facility: CLINIC | Age: 47
End: 2022-01-20

## 2022-01-20 DIAGNOSIS — G43.009 MIGRAINE WITHOUT AURA AND WITHOUT STATUS MIGRAINOSUS, NOT INTRACTABLE: Primary | ICD-10-CM

## 2022-01-20 RX ORDER — RIZATRIPTAN BENZOATE 10 MG/1
TABLET ORAL
Qty: 12 TABLET | Refills: 0 | OUTPATIENT
Start: 2022-01-20

## 2022-01-21 RX ORDER — NARATRIPTAN 2.5 MG/1
TABLET ORAL
Qty: 12 TABLET | Refills: 0 | Status: SHIPPED | OUTPATIENT
Start: 2022-01-21

## 2022-01-21 NOTE — TELEPHONE ENCOUNTER
From: Bebe Tan  To: Fidelia Kenney MD  Sent: 1/20/2022 10:55 PM CST  Subject: Different Triptan? Hi there,  I seem to be experiencing the rebound effect of rizatriptan, as I am needing it multiple times a week. I’m out of refills.   Is there an

## 2022-01-26 ENCOUNTER — PATIENT MESSAGE (OUTPATIENT)
Dept: NEUROLOGY | Facility: CLINIC | Age: 47
End: 2022-01-26

## 2022-01-26 NOTE — TELEPHONE ENCOUNTER
From: Heide Rivas  Sent: 1/26/2022 4:04 PM CST  To: Jeanmarie Nageotte Nurse  Subject: Different Triptan?     Ignore my pharmacy question…I just found it in my “MyChart.” :P

## 2022-01-27 ENCOUNTER — TELEMEDICINE (OUTPATIENT)
Dept: FAMILY MEDICINE CLINIC | Facility: CLINIC | Age: 47
End: 2022-01-27

## 2022-01-27 DIAGNOSIS — Z30.41 SURVEILLANCE OF PREVIOUSLY PRESCRIBED CONTRACEPTIVE PILL: ICD-10-CM

## 2022-01-27 DIAGNOSIS — F98.8 ATTENTION DEFICIT DISORDER (ADD) WITHOUT HYPERACTIVITY: ICD-10-CM

## 2022-01-27 DIAGNOSIS — B00.1 RECURRENT COLD SORES: ICD-10-CM

## 2022-01-27 DIAGNOSIS — B00.9 HERPES SIMPLEX: Primary | ICD-10-CM

## 2022-01-27 PROCEDURE — 99213 OFFICE O/P EST LOW 20 MIN: CPT | Performed by: FAMILY MEDICINE

## 2022-01-27 RX ORDER — FAMCICLOVIR 250 MG/1
250 TABLET, FILM COATED ORAL DAILY
Qty: 90 TABLET | Refills: 3 | Status: SHIPPED | OUTPATIENT
Start: 2022-01-27

## 2022-01-27 RX ORDER — DEXTROAMPHETAMINE SACCHARATE, AMPHETAMINE ASPARTATE, DEXTROAMPHETAMINE SULFATE AND AMPHETAMINE SULFATE 7.5; 7.5; 7.5; 7.5 MG/1; MG/1; MG/1; MG/1
30 TABLET ORAL 3 TIMES DAILY
Qty: 90 TABLET | Refills: 0 | Status: SHIPPED | OUTPATIENT
Start: 2022-02-27 | End: 2022-03-29

## 2022-01-27 RX ORDER — DEXTROAMPHETAMINE SACCHARATE, AMPHETAMINE ASPARTATE, DEXTROAMPHETAMINE SULFATE AND AMPHETAMINE SULFATE 7.5; 7.5; 7.5; 7.5 MG/1; MG/1; MG/1; MG/1
30 TABLET ORAL 3 TIMES DAILY
Qty: 90 TABLET | Refills: 0 | Status: SHIPPED | OUTPATIENT
Start: 2022-01-27 | End: 2022-02-26

## 2022-01-27 RX ORDER — DROSPIRENONE AND ETHINYL ESTRADIOL 0.02-3(28)
1 KIT ORAL DAILY
Qty: 84 TABLET | Refills: 3 | Status: SHIPPED | OUTPATIENT
Start: 2022-01-27

## 2022-01-27 RX ORDER — DEXTROAMPHETAMINE SACCHARATE, AMPHETAMINE ASPARTATE, DEXTROAMPHETAMINE SULFATE AND AMPHETAMINE SULFATE 7.5; 7.5; 7.5; 7.5 MG/1; MG/1; MG/1; MG/1
30 TABLET ORAL 3 TIMES DAILY
Qty: 90 TABLET | Refills: 0 | Status: SHIPPED | OUTPATIENT
Start: 2022-03-30 | End: 2022-04-29

## 2022-01-27 NOTE — PROGRESS NOTES
Jamila Reno is a 40year old female. Patient presents with:  Medication Follow-Up    This visit is conducted using telemedicine with live interactive video and audio. Jamila Reno verbally consents to virtual video visit.    Patient u amphetamine-dextroamphetamine (ADDERALL) 30 MG Oral Tab Take 1 tablet (30 mg total) by mouth 3 (three) times daily.  90 tablet 0   • Phentermine HCl 37.5 MG Oral Tab Take 1 tablet (37.5 mg total) by mouth before breakfast. 30 tablet 0   • Azelastine HCl 0.1 AND PLAN:   Diagnoses and all orders for this visit:    Herpes simplex controlled  -     famciclovir 250 MG Oral Tab; Take 1 tablet (250 mg total) by mouth daily.   -patient to continue medication as prescribed    Recurrent cold sores  -     famciclovir 250

## 2022-03-02 NOTE — TELEPHONE ENCOUNTER
Pt notified will call back to schedule appt What Type Of Note Output Would You Prefer (Optional)?: Bullet Format On A Scale Of 0 To 10 How Would You Rate Your Itching?: 5 How Did Your Itching Occur?: sudden in onset (over a period of weeks to a few months) How Severe Is Your Itching?: moderate Additional History: Pt states aloe Vera and otc anti itch lotion. Pt does have psoriasis being treated by rheumatologist.

## 2022-03-07 RX ORDER — NARATRIPTAN 2.5 MG/1
TABLET ORAL
Qty: 12 TABLET | Refills: 0 | Status: SHIPPED | OUTPATIENT
Start: 2022-03-07 | End: 2022-03-28

## 2022-03-25 ENCOUNTER — TELEPHONE (OUTPATIENT)
Dept: FAMILY MEDICINE CLINIC | Facility: CLINIC | Age: 47
End: 2022-03-25

## 2022-03-25 NOTE — TELEPHONE ENCOUNTER
Received call from Pharmacist at [facility].  They want to make sure Dr. Wells Overall is aware of all the stimulant medications patient is taking. States she is getting a large dose of Adderall TID. Phentermine and Modafinil ordered by other providers and filled at [facility]. Phentermine ordered by MARY ELLEN Vitale at [facility] and Modafinil by Dr. Marylou Abbott, [facility]. States they will caution patient when filling medication.     Ernst Mejia

## 2022-03-28 RX ORDER — NARATRIPTAN 2.5 MG/1
TABLET ORAL
Qty: 12 TABLET | Refills: 0 | Status: SHIPPED | OUTPATIENT
Start: 2022-03-28

## 2022-04-22 ENCOUNTER — PATIENT MESSAGE (OUTPATIENT)
Dept: NEUROLOGY | Facility: CLINIC | Age: 47
End: 2022-04-22

## 2022-04-25 NOTE — TELEPHONE ENCOUNTER
From: Zechariah Record  Sent: 4/22/2022 4:24 PM CDT  To: Go Edwards Nurse  Subject: Medication refill    I apologize, I have been traveling out of [facility] so much lately. I just called your office, but the answering service could not take my message. I need to make a video or office apt, but my availability it limited due to work travel. I will be in [facility] next wed & thurs. I do not yet know my schedule for the following week (1st week of may). Please give me a call at your earliest convenience.  [phone]  Thank you

## 2022-05-23 ENCOUNTER — PATIENT MESSAGE (OUTPATIENT)
Dept: NEUROLOGY | Facility: CLINIC | Age: 47
End: 2022-05-23

## 2022-06-07 ENCOUNTER — TELEMEDICINE (OUTPATIENT)
Dept: NEUROLOGY | Facility: CLINIC | Age: 47
End: 2022-06-07
Payer: COMMERCIAL

## 2022-06-07 ENCOUNTER — TELEPHONE (OUTPATIENT)
Dept: NEUROLOGY | Facility: CLINIC | Age: 47
End: 2022-06-07

## 2022-06-07 DIAGNOSIS — G43.009 MIGRAINE WITHOUT AURA AND WITHOUT STATUS MIGRAINOSUS, NOT INTRACTABLE: ICD-10-CM

## 2022-06-07 DIAGNOSIS — R53.82 CHRONIC FATIGUE: Primary | ICD-10-CM

## 2022-06-07 PROCEDURE — 99213 OFFICE O/P EST LOW 20 MIN: CPT | Performed by: OTHER

## 2022-06-07 RX ORDER — RIZATRIPTAN BENZOATE 10 MG/1
TABLET ORAL
Qty: 12 TABLET | Refills: 0 | Status: SHIPPED | OUTPATIENT
Start: 2022-06-07

## 2022-06-07 RX ORDER — PROPRANOLOL HYDROCHLORIDE 10 MG/1
TABLET ORAL
Qty: 120 TABLET | Refills: 3 | Status: SHIPPED | OUTPATIENT
Start: 2022-06-07

## 2022-06-07 RX ORDER — FREMANEZUMAB-VFRM 225 MG/1.5ML
225 INJECTION SUBCUTANEOUS
Qty: 1.5 ML | Refills: 5 | Status: SHIPPED | OUTPATIENT
Start: 2022-06-07 | End: 2023-06-07

## 2022-06-07 NOTE — PROGRESS NOTES
This follow up is conducted as a video visit after consent was obtained from patient and patient' POA      HISTORY OF PRESENT ILLNESS:  Wallace Morel is a(n) 56year old, right handed,  female who presents for migraine management. Current symptoms and major concerns for this visit were discussed:  Patient was last seen on 3/21/2021, she tried Topamax she could not tolerate, so she stopped it, her insurance does not cover aimovig, since last visit, we tried naratriptan, she feels not help as much as rizatriptan, she want to change it back, she has migraine at least 2-3 times a week, PRN pain medication  is not enough, she is open for other options of preventive treatment. Current concerns are preventive medication discussion. She tried topamax, it caused sleepiness, drowsiness,  rizatriptan, emovig insurance does not cover,  She had covid infection three weeks ago, she has constant HA 24/7 frontal HA,         Neurological Examination through video   General appearance: normal   Sitting and standing, normal posture,   Skin color: normal    Mentation and language and speech normal  CN nerves:  normal    Motor strength: normal ,    Sensation,  normal  Reflexes not able to do     Gait: normal,      Impression and plan:  Common migraine , not fully controlled,   Tension HA after covid infection three weeks ago,   It may trigger migraine flare up,   Will give her inderal 10 mg bid for two weeks, then 20 mg bid for HA prevention, she failed to tolerate topamax in past,   Her insurance does not cover aimovig,   Will given her ajovy monthly,   Stop naratriptan   Give her rizatriptan,     Will see Dr. Desi Isaac for follow up in 3 months. We discussed in depth regarding diagnosis, prognosis, treatment. Side effect of medications were discussed in details. The patient  was given ample opportunity to ask questions. All questions and concerns were addressed to satisfactory status.    patient  Bandar Erazo understood the limitation of video exam , which is sub -optimal, therefore the consultation opinion may have some limitation due to above. The total time spent on this case is 30 minutes, The patient was instructed to go to local ER if current symptoms worse or significant new symptoms arise. They understood my instruction.      Coretta Heart MD  Board certified Neurologist in [address] [facility] in [address] certified Blayne Marquez  In [address] [facility] with [address] [place] [address]  6/7/2022

## 2022-06-08 ENCOUNTER — TELEPHONE (OUTPATIENT)
Dept: NEUROLOGY | Facility: CLINIC | Age: 47
End: 2022-06-08

## 2022-06-08 NOTE — TELEPHONE ENCOUNTER
Ajovy approved 6/8/2022-6/8/2023. MLTCB on VM (ok per HIPAA consent) to discuss Ajovy administration and storage as well as offer injection training.

## 2022-06-15 ENCOUNTER — PATIENT MESSAGE (OUTPATIENT)
Dept: NEUROLOGY | Facility: CLINIC | Age: 47
End: 2022-06-15

## 2022-06-15 ENCOUNTER — PATIENT MESSAGE (OUTPATIENT)
Dept: FAMILY MEDICINE CLINIC | Facility: CLINIC | Age: 47
End: 2022-06-15

## 2022-06-15 DIAGNOSIS — G43.009 MIGRAINE WITHOUT AURA AND WITHOUT STATUS MIGRAINOSUS, NOT INTRACTABLE: Primary | ICD-10-CM

## 2022-06-15 RX ORDER — RIZATRIPTAN BENZOATE 10 MG/1
TABLET ORAL
Qty: 12 TABLET | Refills: 1 | Status: SHIPPED | OUTPATIENT
Start: 2022-06-15

## 2022-06-15 NOTE — TELEPHONE ENCOUNTER
From: Florecita Lopez  To: Esperanza Boswell MD  Sent: 6/15/2022 11:24 AM CDT  Subject: Video visit availability     Hi,  Does Dr. Ila Coleman have any time this week for a quick video visit just to refill meds? Thank you for your help!   Niels Sarabia

## 2022-06-17 ENCOUNTER — PATIENT MESSAGE (OUTPATIENT)
Dept: FAMILY MEDICINE CLINIC | Facility: CLINIC | Age: 47
End: 2022-06-17

## 2022-06-17 NOTE — TELEPHONE ENCOUNTER
Dr. Samson Marcial,  Please advise if Video Visit Saturday or Monday is OK for med refill    LOV 1/27/22   Video    Herpes  +3   7/27/21   OV   Back pain  +3    Last physical was 8/26/19

## 2022-06-17 NOTE — TELEPHONE ENCOUNTER
LVM for pt to call back before 3pm or to reply back to my message on Chatter[employer] if tomorrow works. Otherwise would have to wait until next week for possible appt.

## 2022-06-17 NOTE — TELEPHONE ENCOUNTER
Scheduled    Future Appointments   Date Time Provider Julian Amaya   6/18/2022 10:40 AM Jenelle Hayes MD [facility]

## 2022-06-18 ENCOUNTER — TELEMEDICINE (OUTPATIENT)
Dept: FAMILY MEDICINE CLINIC | Facility: CLINIC | Age: 47
End: 2022-06-18
Payer: COMMERCIAL

## 2022-06-18 VITALS — WEIGHT: 185 LBS | BODY MASS INDEX: 29 KG/M2

## 2022-06-18 DIAGNOSIS — G89.29 POST-COVID CHRONIC HEADACHE: ICD-10-CM

## 2022-06-18 DIAGNOSIS — G43.709 CHRONIC MIGRAINE WITHOUT AURA WITHOUT STATUS MIGRAINOSUS, NOT INTRACTABLE: ICD-10-CM

## 2022-06-18 DIAGNOSIS — Z51.81 ENCOUNTER FOR THERAPEUTIC DRUG MONITORING: Primary | ICD-10-CM

## 2022-06-18 DIAGNOSIS — R51.9 POST-COVID CHRONIC HEADACHE: ICD-10-CM

## 2022-06-18 DIAGNOSIS — F98.8 ATTENTION DEFICIT DISORDER (ADD) WITHOUT HYPERACTIVITY: ICD-10-CM

## 2022-06-18 DIAGNOSIS — R03.0 ELEVATED BLOOD PRESSURE READING WITHOUT DIAGNOSIS OF HYPERTENSION: ICD-10-CM

## 2022-06-18 DIAGNOSIS — R63.5 WEIGHT GAIN: ICD-10-CM

## 2022-06-18 DIAGNOSIS — U09.9 POST-COVID CHRONIC HEADACHE: ICD-10-CM

## 2022-06-18 PROCEDURE — 99213 OFFICE O/P EST LOW 20 MIN: CPT | Performed by: FAMILY MEDICINE

## 2022-06-18 RX ORDER — DEXTROAMPHETAMINE SACCHARATE, AMPHETAMINE ASPARTATE, DEXTROAMPHETAMINE SULFATE AND AMPHETAMINE SULFATE 7.5; 7.5; 7.5; 7.5 MG/1; MG/1; MG/1; MG/1
30 TABLET ORAL 3 TIMES DAILY
Qty: 90 TABLET | Refills: 0 | Status: SHIPPED | OUTPATIENT
Start: 2022-08-19 | End: 2022-09-18

## 2022-06-18 RX ORDER — DEXTROAMPHETAMINE SACCHARATE, AMPHETAMINE ASPARTATE, DEXTROAMPHETAMINE SULFATE AND AMPHETAMINE SULFATE 7.5; 7.5; 7.5; 7.5 MG/1; MG/1; MG/1; MG/1
30 TABLET ORAL 3 TIMES DAILY
Qty: 90 TABLET | Refills: 0 | Status: SHIPPED | OUTPATIENT
Start: 2022-06-18 | End: 2022-07-18

## 2022-06-18 RX ORDER — DROSPIRENONE AND ETHINYL ESTRADIOL 0.02-3(28)
1 KIT ORAL DAILY
Qty: 84 TABLET | Refills: 3 | Status: CANCELLED | OUTPATIENT
Start: 2022-06-18

## 2022-06-18 RX ORDER — DEXTROAMPHETAMINE SACCHARATE, AMPHETAMINE ASPARTATE, DEXTROAMPHETAMINE SULFATE AND AMPHETAMINE SULFATE 7.5; 7.5; 7.5; 7.5 MG/1; MG/1; MG/1; MG/1
30 TABLET ORAL 3 TIMES DAILY
Qty: 90 TABLET | Refills: 0 | Status: CANCELLED | OUTPATIENT
Start: 2022-06-18

## 2022-06-18 RX ORDER — DEXTROAMPHETAMINE SACCHARATE, AMPHETAMINE ASPARTATE, DEXTROAMPHETAMINE SULFATE AND AMPHETAMINE SULFATE 7.5; 7.5; 7.5; 7.5 MG/1; MG/1; MG/1; MG/1
30 TABLET ORAL 3 TIMES DAILY
Qty: 90 TABLET | Refills: 0 | Status: SHIPPED | OUTPATIENT
Start: 2022-07-19 | End: 2022-08-18

## 2022-06-18 NOTE — PATIENT INSTRUCTIONS
Please monitor your blood pressure at home, if your diastolic blood pressure is persistently elevated please follow-up with me in office. Continue Adderall as prescribed. Please get your lab work done to see if your thyroid levels look okay, your weight gain could also be secondary to Lexapro. Continue healthy diet and exercise.

## 2022-06-21 DIAGNOSIS — F98.8 ATTENTION DEFICIT DISORDER (ADD) WITHOUT HYPERACTIVITY: ICD-10-CM

## 2022-06-21 NOTE — TELEPHONE ENCOUNTER
Pt sent [employer] message stating her vivien is out of her adderall. Wanted it sent to [facility]-advised her to call to check if it is in stock but they are also out. They wouldn't tell pt who had it in stock and she asked if we can check also. Advised pt to call around to other locations near her as well in the meantime.

## 2022-06-22 RX ORDER — DEXTROAMPHETAMINE SACCHARATE, AMPHETAMINE ASPARTATE, DEXTROAMPHETAMINE SULFATE AND AMPHETAMINE SULFATE 7.5; 7.5; 7.5; 7.5 MG/1; MG/1; MG/1; MG/1
30 TABLET ORAL 3 TIMES DAILY
Qty: 90 TABLET | Refills: 0 | Status: SHIPPED | OUTPATIENT
Start: 2022-06-22 | End: 2022-07-22

## 2022-07-16 LAB
ABSOLUTE BASOPHILS: 30 CELLS/UL (ref 0–200)
ABSOLUTE EOSINOPHILS: 83 CELLS/UL (ref 15–500)
ABSOLUTE LYMPHOCYTES: 2295 CELLS/UL (ref 850–3900)
ABSOLUTE MONOCYTES: 555 CELLS/UL (ref 200–950)
ABSOLUTE NEUTROPHILS: 4538 CELLS/UL (ref 1500–7800)
ALBUMIN/GLOBULIN RATIO: 1.3 (CALC) (ref 1–2.5)
ALBUMIN: 4 G/DL (ref 3.6–5.1)
ALKALINE PHOSPHATASE: 101 U/L (ref 31–125)
ALT: 13 U/L (ref 6–29)
AST: 16 U/L (ref 10–35)
BASOPHILS: 0.4 %
BILIRUBIN, TOTAL: 0.3 MG/DL (ref 0.2–1.2)
BUN: 18 MG/DL (ref 7–25)
CALCIUM: 9.5 MG/DL (ref 8.6–10.2)
CARBON DIOXIDE: 27 MMOL/L (ref 20–32)
CHLORIDE: 101 MMOL/L (ref 98–110)
CHOL/HDLC RATIO: 2.6 (CALC)
CHOLESTEROL, TOTAL: 246 MG/DL
CREATININE: 0.73 MG/DL (ref 0.5–0.99)
EGFR: 103 ML/MIN/1.73M2
EOSINOPHILS: 1.1 %
GLOBULIN: 3.2 G/DL (CALC) (ref 1.9–3.7)
GLUCOSE: 102 MG/DL (ref 65–139)
HDL CHOLESTEROL: 94 MG/DL
HEMATOCRIT: 39.8 % (ref 35–45)
HEMOGLOBIN A1C: 5.3 % OF TOTAL HGB
HEMOGLOBIN: 13.6 G/DL (ref 11.7–15.5)
LDL-CHOLESTEROL: 118 MG/DL (CALC)
LYMPHOCYTES: 30.6 %
MCH: 30.2 PG (ref 27–33)
MCHC: 34.2 G/DL (ref 32–36)
MCV: 88.2 FL (ref 80–100)
MONOCYTES: 7.4 %
MPV: 10.9 FL (ref 7.5–12.5)
NEUTROPHILS: 60.5 %
NON-HDL CHOLESTEROL: 152 MG/DL (CALC)
PLATELET COUNT: 347 THOUSAND/UL (ref 140–400)
POTASSIUM: 4 MMOL/L (ref 3.5–5.3)
PROTEIN, TOTAL: 7.2 G/DL (ref 6.1–8.1)
RDW: 14.3 % (ref 11–15)
RED BLOOD CELL COUNT: 4.51 MILLION/UL (ref 3.8–5.1)
SODIUM: 138 MMOL/L (ref 135–146)
TRIGLYCERIDES: 217 MG/DL
TSH W/REFLEX TO FT4: 3.29 MIU/L
WHITE BLOOD CELL COUNT: 7.5 THOUSAND/UL (ref 3.8–10.8)

## 2022-07-26 DIAGNOSIS — F98.8 ATTENTION DEFICIT DISORDER (ADD) WITHOUT HYPERACTIVITY: ICD-10-CM

## 2022-07-27 ENCOUNTER — PATIENT MESSAGE (OUTPATIENT)
Dept: NEUROLOGY | Facility: CLINIC | Age: 47
End: 2022-07-27

## 2022-07-27 DIAGNOSIS — G43.009 MIGRAINE WITHOUT AURA AND WITHOUT STATUS MIGRAINOSUS, NOT INTRACTABLE: ICD-10-CM

## 2022-07-27 RX ORDER — DEXTROAMPHETAMINE SACCHARATE, AMPHETAMINE ASPARTATE, DEXTROAMPHETAMINE SULFATE AND AMPHETAMINE SULFATE 7.5; 7.5; 7.5; 7.5 MG/1; MG/1; MG/1; MG/1
30 TABLET ORAL 3 TIMES DAILY
Qty: 90 TABLET | Refills: 0 | OUTPATIENT
Start: 2022-07-27 | End: 2022-08-26

## 2022-07-27 RX ORDER — RIZATRIPTAN BENZOATE 10 MG/1
TABLET ORAL
Qty: 12 TABLET | Refills: 1 | Status: SHIPPED | OUTPATIENT
Start: 2022-07-27

## 2022-08-09 ENCOUNTER — PATIENT MESSAGE (OUTPATIENT)
Dept: FAMILY MEDICINE CLINIC | Facility: CLINIC | Age: 47
End: 2022-08-09

## 2022-08-09 DIAGNOSIS — F98.8 ATTENTION DEFICIT DISORDER (ADD) WITHOUT HYPERACTIVITY: ICD-10-CM

## 2022-08-23 RX ORDER — DEXTROAMPHETAMINE SACCHARATE, AMPHETAMINE ASPARTATE, DEXTROAMPHETAMINE SULFATE AND AMPHETAMINE SULFATE 7.5; 7.5; 7.5; 7.5 MG/1; MG/1; MG/1; MG/1
30 TABLET ORAL 3 TIMES DAILY
Qty: 90 TABLET | Refills: 0 | Status: SHIPPED | OUTPATIENT
Start: 2022-09-20 | End: 2022-10-20

## 2022-09-17 ENCOUNTER — PATIENT MESSAGE (OUTPATIENT)
Dept: FAMILY MEDICINE CLINIC | Facility: CLINIC | Age: 47
End: 2022-09-17

## 2022-09-27 RX ORDER — DEXTROAMPHETAMINE SACCHARATE, AMPHETAMINE ASPARTATE, DEXTROAMPHETAMINE SULFATE AND AMPHETAMINE SULFATE 2.5; 2.5; 2.5; 2.5 MG/1; MG/1; MG/1; MG/1
10 TABLET ORAL 3 TIMES DAILY
Qty: 90 TABLET | Refills: 0 | Status: SHIPPED | OUTPATIENT
Start: 2022-09-27 | End: 2022-10-27

## 2022-09-27 RX ORDER — DEXTROAMPHETAMINE SACCHARATE, AMPHETAMINE ASPARTATE, DEXTROAMPHETAMINE SULFATE AND AMPHETAMINE SULFATE 5; 5; 5; 5 MG/1; MG/1; MG/1; MG/1
20 TABLET ORAL 3 TIMES DAILY
Qty: 90 TABLET | Refills: 0 | Status: SHIPPED | OUTPATIENT
Start: 2022-09-27 | End: 2022-10-27

## 2022-09-28 ENCOUNTER — PATIENT MESSAGE (OUTPATIENT)
Dept: FAMILY MEDICINE CLINIC | Facility: CLINIC | Age: 47
End: 2022-09-28

## 2022-10-04 NOTE — TELEPHONE ENCOUNTER
Noted, I would prefer she just take the 20 mg for now and see how she does, if having focus issues with the lower dose to call.

## 2022-10-07 DIAGNOSIS — G43.009 MIGRAINE WITHOUT AURA AND WITHOUT STATUS MIGRAINOSUS, NOT INTRACTABLE: ICD-10-CM

## 2022-10-10 RX ORDER — RIZATRIPTAN BENZOATE 10 MG/1
TABLET ORAL
Qty: 12 TABLET | Refills: 1 | OUTPATIENT
Start: 2022-10-10

## 2022-11-04 DIAGNOSIS — G43.009 MIGRAINE WITHOUT AURA AND WITHOUT STATUS MIGRAINOSUS, NOT INTRACTABLE: ICD-10-CM

## 2022-11-04 RX ORDER — RIZATRIPTAN BENZOATE 10 MG/1
TABLET ORAL
Qty: 12 TABLET | Refills: 1 | OUTPATIENT
Start: 2022-11-04

## 2022-11-08 ENCOUNTER — OFFICE VISIT (OUTPATIENT)
Dept: NEUROLOGY | Facility: CLINIC | Age: 47
End: 2022-11-08
Payer: COMMERCIAL

## 2022-11-08 VITALS
RESPIRATION RATE: 16 BRPM | DIASTOLIC BLOOD PRESSURE: 74 MMHG | BODY MASS INDEX: 30 KG/M2 | HEART RATE: 84 BPM | WEIGHT: 187 LBS | SYSTOLIC BLOOD PRESSURE: 112 MMHG

## 2022-11-08 DIAGNOSIS — G43.009 MIGRAINE WITHOUT AURA AND WITHOUT STATUS MIGRAINOSUS, NOT INTRACTABLE: ICD-10-CM

## 2022-11-08 PROCEDURE — 99215 OFFICE O/P EST HI 40 MIN: CPT | Performed by: OTHER

## 2022-11-08 PROCEDURE — 3078F DIAST BP <80 MM HG: CPT | Performed by: OTHER

## 2022-11-08 PROCEDURE — 3074F SYST BP LT 130 MM HG: CPT | Performed by: OTHER

## 2022-11-08 RX ORDER — RIMEGEPANT SULFATE 75 MG/75MG
75 TABLET, ORALLY DISINTEGRATING ORAL AS NEEDED
Qty: 8 TABLET | Refills: 3 | Status: SHIPPED | OUTPATIENT
Start: 2022-11-08 | End: 2022-12-08

## 2022-11-08 NOTE — PROGRESS NOTES
Patient here to establish care regarding migraines. Former Dr. Martin Pittman pt. States that Ajovy did not help and could not tolerate Propranolol. Has 12-15 migraines on average per month.

## 2022-11-09 ENCOUNTER — TELEPHONE (OUTPATIENT)
Dept: NEUROLOGY | Facility: CLINIC | Age: 47
End: 2022-11-09

## 2022-11-09 NOTE — TELEPHONE ENCOUNTER
PA needed for Nurtec.- attrempted to complete it electronically but unable to answer question regarding whether patient has tried and failed 2 Triptans as only 1 Triptan was in Epic. MLTCB on VM (ok per HIPAA consent) to ask patient if she has ever tried and failed Triptan other than Rizatriptan.

## 2022-11-18 ENCOUNTER — LAB ENCOUNTER (OUTPATIENT)
Dept: LAB | Age: 47
End: 2022-11-18
Attending: PHYSICIAN ASSISTANT
Payer: COMMERCIAL

## 2022-11-18 ENCOUNTER — OFFICE VISIT (OUTPATIENT)
Dept: FAMILY MEDICINE CLINIC | Facility: CLINIC | Age: 47
End: 2022-11-18
Payer: COMMERCIAL

## 2022-11-18 VITALS
SYSTOLIC BLOOD PRESSURE: 102 MMHG | RESPIRATION RATE: 16 BRPM | BODY MASS INDEX: 30.05 KG/M2 | HEIGHT: 66.5 IN | DIASTOLIC BLOOD PRESSURE: 70 MMHG | HEART RATE: 87 BPM | WEIGHT: 189.19 LBS | OXYGEN SATURATION: 97 %

## 2022-11-18 DIAGNOSIS — Z11.1 SCREENING-PULMONARY TB: ICD-10-CM

## 2022-11-18 DIAGNOSIS — Z23 NEED FOR VACCINATION: ICD-10-CM

## 2022-11-18 DIAGNOSIS — E66.09 CLASS 1 OBESITY DUE TO EXCESS CALORIES WITHOUT SERIOUS COMORBIDITY WITH BODY MASS INDEX (BMI) OF 30.0 TO 30.9 IN ADULT: Primary | ICD-10-CM

## 2022-11-18 PROCEDURE — 90472 IMMUNIZATION ADMIN EACH ADD: CPT | Performed by: FAMILY MEDICINE

## 2022-11-18 PROCEDURE — 3078F DIAST BP <80 MM HG: CPT | Performed by: FAMILY MEDICINE

## 2022-11-18 PROCEDURE — 3008F BODY MASS INDEX DOCD: CPT | Performed by: FAMILY MEDICINE

## 2022-11-18 PROCEDURE — 90471 IMMUNIZATION ADMIN: CPT | Performed by: FAMILY MEDICINE

## 2022-11-18 PROCEDURE — 86480 TB TEST CELL IMMUN MEASURE: CPT | Performed by: PHYSICIAN ASSISTANT

## 2022-11-18 PROCEDURE — 90686 IIV4 VACC NO PRSV 0.5 ML IM: CPT | Performed by: FAMILY MEDICINE

## 2022-11-18 PROCEDURE — 99214 OFFICE O/P EST MOD 30 MIN: CPT | Performed by: PHYSICIAN ASSISTANT

## 2022-11-18 PROCEDURE — 3074F SYST BP LT 130 MM HG: CPT | Performed by: FAMILY MEDICINE

## 2022-11-18 RX ORDER — PEN NEEDLE, DIABETIC 30 GX3/16"
1 NEEDLE, DISPOSABLE MISCELLANEOUS DAILY
Qty: 90 EACH | Refills: 0 | Status: SHIPPED | OUTPATIENT
Start: 2022-11-18 | End: 2023-02-16

## 2022-11-18 RX ORDER — LIRAGLUTIDE 6 MG/ML
INJECTION, SOLUTION SUBCUTANEOUS
Qty: 3 EACH | Refills: 3 | Status: SHIPPED | OUTPATIENT
Start: 2022-11-18 | End: 2022-11-21

## 2022-11-19 ENCOUNTER — PATIENT MESSAGE (OUTPATIENT)
Dept: FAMILY MEDICINE CLINIC | Facility: CLINIC | Age: 47
End: 2022-11-19

## 2022-11-21 LAB
M TB IFN-G CD4+ T-CELLS BLD-ACNC: 0.03 IU/ML
M TB TUBERC IFN-G BLD QL: NEGATIVE
M TB TUBERC IGNF/MITOGEN IGNF CONTROL: >10 IU/ML
QFT TB1 AG MINUS NIL: 0.05 IU/ML
QFT TB2 AG MINUS NIL: 0.11 IU/ML

## 2022-11-29 ENCOUNTER — TELEMEDICINE (OUTPATIENT)
Dept: FAMILY MEDICINE CLINIC | Facility: CLINIC | Age: 47
End: 2022-11-29
Payer: COMMERCIAL

## 2022-11-29 DIAGNOSIS — Z51.81 ENCOUNTER FOR THERAPEUTIC DRUG MONITORING: Primary | ICD-10-CM

## 2022-11-29 DIAGNOSIS — F98.8 ATTENTION DEFICIT DISORDER (ADD) WITHOUT HYPERACTIVITY: ICD-10-CM

## 2022-11-29 RX ORDER — DEXTROAMPHETAMINE SACCHARATE, AMPHETAMINE ASPARTATE, DEXTROAMPHETAMINE SULFATE AND AMPHETAMINE SULFATE 7.5; 7.5; 7.5; 7.5 MG/1; MG/1; MG/1; MG/1
30 TABLET ORAL 3 TIMES DAILY
Qty: 90 TABLET | Refills: 0 | Status: SHIPPED | OUTPATIENT
Start: 2022-11-29

## 2022-12-05 ENCOUNTER — PATIENT MESSAGE (OUTPATIENT)
Dept: FAMILY MEDICINE CLINIC | Facility: CLINIC | Age: 47
End: 2022-12-05

## 2022-12-05 DIAGNOSIS — G43.009 MIGRAINE WITHOUT AURA AND WITHOUT STATUS MIGRAINOSUS, NOT INTRACTABLE: ICD-10-CM

## 2022-12-06 ENCOUNTER — TELEPHONE (OUTPATIENT)
Dept: FAMILY MEDICINE CLINIC | Facility: CLINIC | Age: 47
End: 2022-12-06

## 2022-12-06 RX ORDER — RIZATRIPTAN BENZOATE 10 MG/1
TABLET ORAL
Qty: 12 TABLET | Refills: 1 | Status: SHIPPED | OUTPATIENT
Start: 2022-12-06

## 2022-12-06 NOTE — TELEPHONE ENCOUNTER
PT WOULD LIKE TO SPEAK TO SOMEONE REGARDING A pa FOR WEGOVY  SHE STATES SHE HAS LEFT MESSAGES 4 TIMES BUT IS NOT GETTING ANY RESPONSE     PLEASE ADVISE

## 2022-12-20 ENCOUNTER — PATIENT MESSAGE (OUTPATIENT)
Dept: FAMILY MEDICINE CLINIC | Facility: CLINIC | Age: 47
End: 2022-12-20

## 2022-12-20 DIAGNOSIS — Z30.41 SURVEILLANCE OF PREVIOUSLY PRESCRIBED CONTRACEPTIVE PILL: ICD-10-CM

## 2022-12-21 NOTE — TELEPHONE ENCOUNTER
Pt needs to pick one pcp has been seeing  [facility] and [facility]  Provider . Needs to choose one . Rx will be denied for now . Until pt calls to confirm .

## 2022-12-29 NOTE — TELEPHONE ENCOUNTER
From: Alf Clifford  Sent: 12/28/2022 9:08 PM CST  To: [facility] Clinical Staff  Subject: Zay Alert refkristine -     Dr. Shashi Gillis.   I see Corine Castro for a specific concern that she specializes in.    Zoila Greco

## 2022-12-30 RX ORDER — DROSPIRENONE AND ETHINYL ESTRADIOL 0.02-3(28)
1 KIT ORAL DAILY
Qty: 84 TABLET | Refills: 0 | Status: SHIPPED | OUTPATIENT
Start: 2022-12-30

## 2022-12-30 NOTE — TELEPHONE ENCOUNTER
Gynecology Medication Protocol Failed 12/29/2022 11:54 AM    PASS--PENDING LAST PAP WNL--VIA MANUAL LOOKUP    Mammogram in past 12 months    Physical or Pelvic/Breast in past 12 or next 3 mos--VIA MANUAL LOOKUP     Last time medication was refilled 1/27/22  Quantity and number of refills 84 w/ 3  Last OV 11/29/22 (Telemedicine)  Next OV asked to return in 3 months - no appointment scheduled

## 2023-01-03 ENCOUNTER — PATIENT MESSAGE (OUTPATIENT)
Dept: FAMILY MEDICINE CLINIC | Facility: CLINIC | Age: 48
End: 2023-01-03

## 2023-01-04 NOTE — TELEPHONE ENCOUNTER
From: Wilder Momin  To: Valeria Self PA-C  Sent: 1/3/2023 7:36 PM CST  Subject: IKWZRW    I just picked up [facility]FORT TONY tonight (it has been unavailable until now). The pharmacy noticed there were no refills and suggested that I notify you so that you can add refills to this Rx. Will also make an appointment to see Savanna Daly after I have been on this medication for at least a month (per her request). Let me know if you have any questions. Thank you.   Lavelle Hodgkins

## 2023-01-07 DIAGNOSIS — F98.8 ATTENTION DEFICIT DISORDER (ADD) WITHOUT HYPERACTIVITY: ICD-10-CM

## 2023-01-09 RX ORDER — DEXTROAMPHETAMINE SACCHARATE, AMPHETAMINE ASPARTATE, DEXTROAMPHETAMINE SULFATE AND AMPHETAMINE SULFATE 7.5; 7.5; 7.5; 7.5 MG/1; MG/1; MG/1; MG/1
30 TABLET ORAL 3 TIMES DAILY
Qty: 90 TABLET | Refills: 0 | Status: SHIPPED | OUTPATIENT
Start: 2023-01-09

## 2023-01-10 ENCOUNTER — TELEPHONE (OUTPATIENT)
Dept: FAMILY MEDICINE CLINIC | Facility: CLINIC | Age: 48
End: 2023-01-10

## 2023-01-11 ENCOUNTER — OFFICE VISIT (OUTPATIENT)
Dept: FAMILY MEDICINE CLINIC | Facility: CLINIC | Age: 48
End: 2023-01-11
Payer: COMMERCIAL

## 2023-01-11 VITALS
SYSTOLIC BLOOD PRESSURE: 106 MMHG | BODY MASS INDEX: 28.27 KG/M2 | TEMPERATURE: 98 F | DIASTOLIC BLOOD PRESSURE: 78 MMHG | WEIGHT: 178 LBS | HEIGHT: 66.5 IN | OXYGEN SATURATION: 99 % | RESPIRATION RATE: 18 BRPM | HEART RATE: 78 BPM

## 2023-01-11 DIAGNOSIS — E66.09 CLASS 1 OBESITY DUE TO EXCESS CALORIES WITHOUT SERIOUS COMORBIDITY WITH BODY MASS INDEX (BMI) OF 30.0 TO 30.9 IN ADULT: Primary | ICD-10-CM

## 2023-01-11 PROCEDURE — 99213 OFFICE O/P EST LOW 20 MIN: CPT | Performed by: PHYSICIAN ASSISTANT

## 2023-01-11 PROCEDURE — 3008F BODY MASS INDEX DOCD: CPT | Performed by: PHYSICIAN ASSISTANT

## 2023-01-11 PROCEDURE — 3074F SYST BP LT 130 MM HG: CPT | Performed by: PHYSICIAN ASSISTANT

## 2023-01-11 PROCEDURE — 3078F DIAST BP <80 MM HG: CPT | Performed by: PHYSICIAN ASSISTANT

## 2023-01-13 ENCOUNTER — TELEPHONE (OUTPATIENT)
Dept: FAMILY MEDICINE CLINIC | Facility: CLINIC | Age: 48
End: 2023-01-13

## 2023-01-13 NOTE — TELEPHONE ENCOUNTER
Pharmacy called needing a PA started for [facility]JANNIE JIMENEZ 0.5/0.5. Since they used Cover My Meds pharmacist gave me phone number of [phone] - ID [id number] to do prior auth.

## 2023-01-26 ENCOUNTER — TELEMEDICINE (OUTPATIENT)
Dept: FAMILY MEDICINE CLINIC | Facility: CLINIC | Age: 48
End: 2023-01-26

## 2023-01-26 DIAGNOSIS — E66.09 CLASS 1 OBESITY DUE TO EXCESS CALORIES WITHOUT SERIOUS COMORBIDITY WITH BODY MASS INDEX (BMI) OF 30.0 TO 30.9 IN ADULT: Primary | ICD-10-CM

## 2023-01-26 PROCEDURE — 99213 OFFICE O/P EST LOW 20 MIN: CPT | Performed by: PHYSICIAN ASSISTANT

## 2023-01-26 RX ORDER — TIRZEPATIDE 10 MG/.5ML
10 INJECTION, SOLUTION SUBCUTANEOUS WEEKLY
Qty: 2 ML | Refills: 0 | Status: SHIPPED | OUTPATIENT
Start: 2023-01-26 | End: 2023-01-27

## 2023-01-27 ENCOUNTER — PATIENT MESSAGE (OUTPATIENT)
Dept: FAMILY MEDICINE CLINIC | Facility: CLINIC | Age: 48
End: 2023-01-27

## 2023-01-27 RX ORDER — TIRZEPATIDE 12.5 MG/.5ML
12.5 INJECTION, SOLUTION SUBCUTANEOUS WEEKLY
Qty: 2 ML | Refills: 0 | Status: SHIPPED | OUTPATIENT
Start: 2023-01-27

## 2023-01-27 NOTE — TELEPHONE ENCOUNTER
LR-  Please see msg and advise.   Pt asking if she should stay on 10mg of mounjaro although out of stock or can she get a new script for 12.5mg

## 2023-02-02 DIAGNOSIS — F98.8 ATTENTION DEFICIT DISORDER (ADD) WITHOUT HYPERACTIVITY: ICD-10-CM

## 2023-02-03 RX ORDER — DEXTROAMPHETAMINE SACCHARATE, AMPHETAMINE ASPARTATE, DEXTROAMPHETAMINE SULFATE AND AMPHETAMINE SULFATE 7.5; 7.5; 7.5; 7.5 MG/1; MG/1; MG/1; MG/1
30 TABLET ORAL 3 TIMES DAILY
Qty: 90 TABLET | Refills: 0 | Status: SHIPPED | OUTPATIENT
Start: 2023-02-03

## 2023-02-06 ENCOUNTER — TELEPHONE (OUTPATIENT)
Dept: NEUROLOGY | Facility: CLINIC | Age: 48
End: 2023-02-06

## 2023-02-06 NOTE — TELEPHONE ENCOUNTER
[facility] PA form for Nurtec completed and faxed along with LOV notes with fax confirmation received.

## 2023-03-08 ENCOUNTER — PATIENT MESSAGE (OUTPATIENT)
Dept: FAMILY MEDICINE CLINIC | Facility: CLINIC | Age: 48
End: 2023-03-08

## 2023-03-08 DIAGNOSIS — R53.83 FATIGUE, UNSPECIFIED TYPE: Primary | ICD-10-CM

## 2023-03-08 RX ORDER — TIRZEPATIDE 10 MG/.5ML
10 INJECTION, SOLUTION SUBCUTANEOUS WEEKLY
Qty: 2 ML | Refills: 2 | Status: SHIPPED | OUTPATIENT
Start: 2023-03-08 | End: 2023-03-30

## 2023-03-11 DIAGNOSIS — G43.009 MIGRAINE WITHOUT AURA AND WITHOUT STATUS MIGRAINOSUS, NOT INTRACTABLE: ICD-10-CM

## 2023-03-13 RX ORDER — RIZATRIPTAN BENZOATE 10 MG/1
TABLET ORAL
Qty: 12 TABLET | Refills: 1 | Status: SHIPPED | OUTPATIENT
Start: 2023-03-13

## 2023-03-13 NOTE — TELEPHONE ENCOUNTER
Medication: Rizatriptan     Date of last refill: 1/2/23 (#12/1)  [place]      Last office visit: 11/8/2022  Due back to clinic per last office note:  6 months  Date next office visit scheduled:    Future Appointments   Date Time Provider Julian Monique   5/16/2023  2:20 PM Marielos Parks MD [facility] [address]           Last OV note recommendation:    Please keep a headache diary  Migraine education given  Medication overuse headache education given  Rizatriptan prn  Pt wants to try nurtec, if helping, may consider use it as preventive medications  See orders and medications filed with this encounter. The patient indicates understanding of these issues and agrees with the plan. Discussed with patient regarding assessment, care plan, options for preventive and abortive meds.    RTC 6 months, requested update re; nurtec

## 2023-03-29 ENCOUNTER — TELEPHONE (OUTPATIENT)
Dept: FAMILY MEDICINE CLINIC | Facility: CLINIC | Age: 48
End: 2023-03-29

## 2023-03-29 ENCOUNTER — LAB ENCOUNTER (OUTPATIENT)
Dept: LAB | Age: 48
End: 2023-03-29
Attending: PHYSICIAN ASSISTANT
Payer: COMMERCIAL

## 2023-03-29 DIAGNOSIS — R53.83 FATIGUE, UNSPECIFIED TYPE: ICD-10-CM

## 2023-03-29 LAB — VIT B12 SERPL-MCNC: 320 PG/ML (ref 193–986)

## 2023-03-29 PROCEDURE — 82607 VITAMIN B-12: CPT | Performed by: PHYSICIAN ASSISTANT

## 2023-04-03 NOTE — TELEPHONE ENCOUNTER
Patient due for follow up-my chart sent to make an in office appointment so we can document weight and vitals  LV:1/26/2023 TELEMED  LR:3/8/2023

## 2023-04-04 RX ORDER — TIRZEPATIDE 10 MG/.5ML
10 INJECTION, SOLUTION SUBCUTANEOUS WEEKLY
Qty: 2 ML | Refills: 2 | Status: SHIPPED | OUTPATIENT
Start: 2023-04-04

## 2023-04-10 NOTE — TELEPHONE ENCOUNTER
PA paper work requested to fill out, will await fax.   Phone [phone]  ID [id number]  Time spent 20min

## 2023-04-13 ENCOUNTER — OFFICE VISIT (OUTPATIENT)
Dept: FAMILY MEDICINE CLINIC | Facility: CLINIC | Age: 48
End: 2023-04-13
Payer: COMMERCIAL

## 2023-04-13 VITALS — RESPIRATION RATE: 18 BRPM | HEIGHT: 66.5 IN | BODY MASS INDEX: 28 KG/M2 | OXYGEN SATURATION: 99 %

## 2023-04-13 DIAGNOSIS — E53.8 VITAMIN B12 DEFICIENCY: ICD-10-CM

## 2023-04-13 DIAGNOSIS — R53.83 PROFOUND FATIGUE: ICD-10-CM

## 2023-04-13 DIAGNOSIS — E66.09 CLASS 1 OBESITY DUE TO EXCESS CALORIES WITHOUT SERIOUS COMORBIDITY WITH BODY MASS INDEX (BMI) OF 30.0 TO 30.9 IN ADULT: Primary | ICD-10-CM

## 2023-04-13 PROCEDURE — 96372 THER/PROPH/DIAG INJ SC/IM: CPT | Performed by: PHYSICIAN ASSISTANT

## 2023-04-13 PROCEDURE — 99214 OFFICE O/P EST MOD 30 MIN: CPT | Performed by: PHYSICIAN ASSISTANT

## 2023-04-13 RX ORDER — CYANOCOBALAMIN 1000 UG/ML
1000 INJECTION, SOLUTION INTRAMUSCULAR; SUBCUTANEOUS ONCE
Status: COMPLETED | OUTPATIENT
Start: 2023-04-13 | End: 2023-04-13

## 2023-04-13 RX ORDER — PHENTERMINE HYDROCHLORIDE 37.5 MG/1
37.5 TABLET ORAL
Qty: 30 TABLET | Refills: 2 | Status: SHIPPED | OUTPATIENT
Start: 2023-04-13

## 2023-04-13 RX ADMIN — CYANOCOBALAMIN 1000 MCG: 1000 INJECTION, SOLUTION INTRAMUSCULAR; SUBCUTANEOUS at 15:06:00

## 2023-04-13 NOTE — PROGRESS NOTES
Guicho Franklin has been approved until 4/2025    [facility] pharmacy and they will order it. Called Pt and let her know that they are ordering it and should have it tomorrow they said.

## 2023-04-18 ENCOUNTER — TELEPHONE (OUTPATIENT)
Dept: NEUROLOGY | Facility: CLINIC | Age: 48
End: 2023-04-18

## 2023-04-21 ENCOUNTER — TELEPHONE (OUTPATIENT)
Dept: FAMILY MEDICINE CLINIC | Facility: CLINIC | Age: 48
End: 2023-04-21

## 2023-04-21 DIAGNOSIS — F98.8 ATTENTION DEFICIT DISORDER (ADD) WITHOUT HYPERACTIVITY: ICD-10-CM

## 2023-04-23 RX ORDER — DEXTROAMPHETAMINE SACCHARATE, AMPHETAMINE ASPARTATE, DEXTROAMPHETAMINE SULFATE AND AMPHETAMINE SULFATE 7.5; 7.5; 7.5; 7.5 MG/1; MG/1; MG/1; MG/1
30 TABLET ORAL 3 TIMES DAILY
Qty: 90 TABLET | Refills: 0 | Status: SHIPPED | OUTPATIENT
Start: 2023-04-23

## 2023-05-04 DIAGNOSIS — G43.009 MIGRAINE WITHOUT AURA AND WITHOUT STATUS MIGRAINOSUS, NOT INTRACTABLE: ICD-10-CM

## 2023-05-05 RX ORDER — RIZATRIPTAN BENZOATE 10 MG/1
TABLET ORAL
Qty: 12 TABLET | Refills: 1 | Status: SHIPPED | OUTPATIENT
Start: 2023-05-05

## 2023-05-05 NOTE — TELEPHONE ENCOUNTER
EXAM DESCRIPTION:  CT - [id number] - 4/22/2018 5:48 pm

 

CLINICAL HISTORY:  Trauma, head and neck injury.

 

COMPARISON:  None.

 

TECHNIQUE:  Axial 5 mm thick images of the head were obtained.

 

Axial 2 mm thick images of the cervical spine were obtained with sagittal and coronal reconstruction 
images generated and reviewed.

 

All CT scans are performed using dose optimization technique as appropriate and may include automated
 exposure control or mA/KV adjustment according to patient size.

 

FINDINGS:  CT HEAD WITHOUT CONTRAST:

 

No acute hemorrhage, hydrocephalus or extra-axial collection is identified.No areas of brain edema or
 midline shift.

 

The paranasal sinuses and mastoids are clear.The calvarium is intact.

 

CT CERVICAL SPINE WITHOUT CONTRAST:

 

No fracture or subluxation.Moderate lower cervical degenerative changes.No prevertebral soft tissues 
swelling is identified.

 

IMPRESSION:  No acute intracranial or cervical spine findings.

 

Moderate lower cervical spondylosis. Medication: RIZATRIPTAN BENZOATE 10 MG Oral Tab     Date of last refill: 03/13/2023 (#12/1)  [place]     Last office visit: 11/08/2022  Due back to clinic per last office note:  Around 05/08/2023  Date next office visit scheduled:    Future Appointments   Date Time Provider Julian Amaya   5/16/2023  2:20 PM Karolina Davis MD [facility] [address] [facility] [address]           Last OV note recommendation:    Plan:  Please keep a headache diary  Migraine education given  Medication overuse headache education given  Rizatriptan prn  Pt wants to try nurtec, if helping, may consider use it as preventive medications  See orders and medications filed with this encounter. The patient indicates understanding of these issues and agrees with the plan. Discussed with patient regarding assessment, care plan, options for preventive and abortive meds.    RTC 6 months, requested update re; nurtec  Pt should go ER for any new or worsening symptoms and contact office

## 2023-05-07 DIAGNOSIS — B00.1 RECURRENT COLD SORES: ICD-10-CM

## 2023-05-07 DIAGNOSIS — B00.9 HERPES SIMPLEX: ICD-10-CM

## 2023-05-08 RX ORDER — FAMCICLOVIR 250 MG/1
TABLET ORAL
Qty: 60 TABLET | Refills: 4 | OUTPATIENT
Start: 2023-05-08

## 2023-05-08 NOTE — TELEPHONE ENCOUNTER
Herpes Agent Protocol Passed 05/07/2023 12:31 PM    Appointment in the past 12 or next 3 months        New pcp

## 2023-05-19 ENCOUNTER — OFFICE VISIT (OUTPATIENT)
Dept: NEUROLOGY | Facility: CLINIC | Age: 48
End: 2023-05-19
Payer: COMMERCIAL

## 2023-05-19 ENCOUNTER — TELEPHONE (OUTPATIENT)
Dept: SURGERY | Facility: CLINIC | Age: 48
End: 2023-05-19

## 2023-05-19 VITALS
HEART RATE: 93 BPM | DIASTOLIC BLOOD PRESSURE: 58 MMHG | RESPIRATION RATE: 16 BRPM | WEIGHT: 146.63 LBS | SYSTOLIC BLOOD PRESSURE: 124 MMHG | BODY MASS INDEX: 23 KG/M2

## 2023-05-19 DIAGNOSIS — R27.9 INCOORDINATION: ICD-10-CM

## 2023-05-19 DIAGNOSIS — F98.8 ATTENTION DEFICIT DISORDER (ADD) WITHOUT HYPERACTIVITY: ICD-10-CM

## 2023-05-19 DIAGNOSIS — R29.898 LEFT HAND WEAKNESS: Primary | ICD-10-CM

## 2023-05-19 PROCEDURE — 99215 OFFICE O/P EST HI 40 MIN: CPT | Performed by: OTHER

## 2023-05-19 PROCEDURE — 3078F DIAST BP <80 MM HG: CPT | Performed by: OTHER

## 2023-05-19 PROCEDURE — 3074F SYST BP LT 130 MM HG: CPT | Performed by: OTHER

## 2023-05-19 NOTE — TELEPHONE ENCOUNTER
Spoke to patient she does not have a place in mind yet. She will call back after she finds the place that takes her insurance and that is close to her home.

## 2023-05-19 NOTE — PROGRESS NOTES
Pt states here for follow up on migraine pt states doing the same    Pt states has lost fine motor ability in left hand.

## 2023-05-19 NOTE — TELEPHONE ENCOUNTER
PT would like OT referral changed to external referral. PT requested a call back once referral has been changed to external.

## 2023-05-24 RX ORDER — DEXTROAMPHETAMINE SACCHARATE, AMPHETAMINE ASPARTATE, DEXTROAMPHETAMINE SULFATE AND AMPHETAMINE SULFATE 7.5; 7.5; 7.5; 7.5 MG/1; MG/1; MG/1; MG/1
30 TABLET ORAL 3 TIMES DAILY
Qty: 90 TABLET | Refills: 0 | OUTPATIENT
Start: 2023-05-24

## 2023-05-24 NOTE — TELEPHONE ENCOUNTER
Please call patient to let her know she can only be on one stimulant. I am happy to follow up with her if she would like to discuss which one to choose.

## 2023-06-04 DIAGNOSIS — F98.8 ATTENTION DEFICIT DISORDER (ADD) WITHOUT HYPERACTIVITY: ICD-10-CM

## 2023-06-04 DIAGNOSIS — G43.009 MIGRAINE WITHOUT AURA AND WITHOUT STATUS MIGRAINOSUS, NOT INTRACTABLE: ICD-10-CM

## 2023-06-04 RX ORDER — RIZATRIPTAN BENZOATE 10 MG/1
10 TABLET ORAL AS NEEDED
Qty: 12 TABLET | Refills: 1 | Status: CANCELLED | OUTPATIENT
Start: 2023-06-04

## 2023-06-05 RX ORDER — RIZATRIPTAN BENZOATE 10 MG/1
10 TABLET ORAL AS NEEDED
Qty: 12 TABLET | Refills: 1 | OUTPATIENT
Start: 2023-06-05

## 2023-06-05 RX ORDER — DEXTROAMPHETAMINE SACCHARATE, AMPHETAMINE ASPARTATE, DEXTROAMPHETAMINE SULFATE AND AMPHETAMINE SULFATE 7.5; 7.5; 7.5; 7.5 MG/1; MG/1; MG/1; MG/1
30 TABLET ORAL 3 TIMES DAILY
Qty: 90 TABLET | Refills: 0 | Status: SHIPPED | OUTPATIENT
Start: 2023-06-05

## 2023-06-28 DIAGNOSIS — F98.8 ATTENTION DEFICIT DISORDER (ADD) WITHOUT HYPERACTIVITY: ICD-10-CM

## 2023-06-28 RX ORDER — DEXTROAMPHETAMINE SACCHARATE, AMPHETAMINE ASPARTATE, DEXTROAMPHETAMINE SULFATE AND AMPHETAMINE SULFATE 7.5; 7.5; 7.5; 7.5 MG/1; MG/1; MG/1; MG/1
30 TABLET ORAL 3 TIMES DAILY
Qty: 90 TABLET | Refills: 0 | Status: SHIPPED | OUTPATIENT
Start: 2023-08-30 | End: 2023-09-29

## 2023-06-28 RX ORDER — DEXTROAMPHETAMINE SACCHARATE, AMPHETAMINE ASPARTATE, DEXTROAMPHETAMINE SULFATE AND AMPHETAMINE SULFATE 7.5; 7.5; 7.5; 7.5 MG/1; MG/1; MG/1; MG/1
30 TABLET ORAL 3 TIMES DAILY
Qty: 90 TABLET | Refills: 0 | Status: SHIPPED | OUTPATIENT
Start: 2023-07-03 | End: 2023-08-02

## 2023-06-28 RX ORDER — DEXTROAMPHETAMINE SACCHARATE, AMPHETAMINE ASPARTATE, DEXTROAMPHETAMINE SULFATE AND AMPHETAMINE SULFATE 7.5; 7.5; 7.5; 7.5 MG/1; MG/1; MG/1; MG/1
30 TABLET ORAL 3 TIMES DAILY
Qty: 90 TABLET | Refills: 0 | Status: SHIPPED | OUTPATIENT
Start: 2023-08-01 | End: 2023-08-31

## 2023-06-29 DIAGNOSIS — G43.009 MIGRAINE WITHOUT AURA AND WITHOUT STATUS MIGRAINOSUS, NOT INTRACTABLE: ICD-10-CM

## 2023-06-30 RX ORDER — RIZATRIPTAN BENZOATE 10 MG/1
10 TABLET ORAL AS NEEDED
Qty: 12 TABLET | Refills: 1 | Status: SHIPPED | OUTPATIENT
Start: 2023-06-30

## 2023-07-28 DIAGNOSIS — B00.1 RECURRENT COLD SORES: ICD-10-CM

## 2023-07-28 DIAGNOSIS — B00.9 HERPES SIMPLEX: ICD-10-CM

## 2023-07-28 RX ORDER — FAMCICLOVIR 250 MG/1
250 TABLET ORAL DAILY
Qty: 90 TABLET | Refills: 3 | OUTPATIENT
Start: 2023-07-28

## 2023-07-28 NOTE — TELEPHONE ENCOUNTER
Pt requesting refill on famciclovir 250 MG     [facility] [id number], [address] [id number] [phone]

## 2023-07-31 RX ORDER — FAMCICLOVIR 250 MG/1
250 TABLET ORAL DAILY
Qty: 90 TABLET | Refills: 3 | Status: SHIPPED | OUTPATIENT
Start: 2023-07-31

## 2023-08-04 NOTE — TELEPHONE ENCOUNTER
Medication denied since just filled on 08/03/2023    Medication: rizatriptan     Date of last refill: 06/30/2023 (#12/1)  [place] 23/33/8926 #12 tablets 30 days     Last office visit: 05/19/2023  Due back to clinic per last office note:  3 months  Date next office visit scheduled:    Future Appointments   Date Time Provider Julian Amaya   8/25/2023 10:00 AM Sharri Reeves MD [facility] [place]           Last OV note recommendation:    Assessment:   Pt has new worsening symptoms: left hand weakness and incoordination     Migraine without aura and without status migrainosus  Left hand weakness and incoordination  Left hand tremor, action, mild     Plan:  MRI c spine  OT ordered  Rizatriptan prn ( states nurtec not helping)  See orders and medications filed with this encounter. The patient indicates understanding of these issues and agrees with the plan. Discussed with patient regarding assessment, care plan, options for preventive and abortive meds.    RTC 2-3 months,   Pt should go ER for any new or worsening symptoms and contact office

## 2023-08-12 DIAGNOSIS — E66.09 CLASS 1 OBESITY DUE TO EXCESS CALORIES WITHOUT SERIOUS COMORBIDITY WITH BODY MASS INDEX (BMI) OF 30.0 TO 30.9 IN ADULT: ICD-10-CM

## 2023-08-14 RX ORDER — PHENTERMINE HYDROCHLORIDE 37.5 MG/1
37.5 TABLET ORAL
Qty: 30 TABLET | Refills: 2 | OUTPATIENT
Start: 2023-08-14

## 2023-08-15 DIAGNOSIS — G43.009 MIGRAINE WITHOUT AURA AND WITHOUT STATUS MIGRAINOSUS, NOT INTRACTABLE: ICD-10-CM

## 2023-08-16 RX ORDER — RIZATRIPTAN BENZOATE 10 MG/1
10 TABLET ORAL AS NEEDED
Qty: 12 TABLET | Refills: 1 | Status: SHIPPED | OUTPATIENT
Start: 2023-08-16

## 2023-08-16 NOTE — TELEPHONE ENCOUNTER
Medication: Rizatriptan     Date of last refill: 06/30/2023 (#12/1)  [place] 94/66/7593     Last office visit: 05/19/2023  Due back to clinic per last office note:  3 months  Date next office visit scheduled:    Future Appointments   Date Time Provider Julian Amaya   12/6/2023 11:00 AM Alisa Sung MD [facility] [place]           Last OV note recommendation:    Assessment:   Pt has new worsening symptoms: left hand weakness and incoordination     Migraine without aura and without status migrainosus  Left hand weakness and incoordination  Left hand tremor, action, mild     Plan:  MRI c spine  OT ordered  Rizatriptan prn ( states nurtec not helping)  See orders and medications filed with this encounter. The patient indicates understanding of these issues and agrees with the plan. Discussed with patient regarding assessment, care plan, options for preventive and abortive meds.    RTC 2-3 months,   Pt should go ER for any new or worsening symptoms and contact office

## 2023-08-31 DIAGNOSIS — G43.009 MIGRAINE WITHOUT AURA AND WITHOUT STATUS MIGRAINOSUS, NOT INTRACTABLE: ICD-10-CM

## 2023-09-01 RX ORDER — RIZATRIPTAN BENZOATE 10 MG/1
TABLET ORAL
Qty: 12 TABLET | Refills: 1 | OUTPATIENT
Start: 2023-09-01

## 2023-09-15 ENCOUNTER — PATIENT MESSAGE (OUTPATIENT)
Dept: NEUROLOGY | Facility: CLINIC | Age: 48
End: 2023-09-15

## 2023-09-18 ENCOUNTER — TELEMEDICINE (OUTPATIENT)
Dept: FAMILY MEDICINE CLINIC | Facility: CLINIC | Age: 48
End: 2023-09-18
Payer: COMMERCIAL

## 2023-09-18 DIAGNOSIS — E66.09 CLASS 1 OBESITY DUE TO EXCESS CALORIES WITHOUT SERIOUS COMORBIDITY WITH BODY MASS INDEX (BMI) OF 30.0 TO 30.9 IN ADULT: Primary | ICD-10-CM

## 2023-09-18 DIAGNOSIS — F98.8 ATTENTION DEFICIT DISORDER (ADD) WITHOUT HYPERACTIVITY: ICD-10-CM

## 2023-09-18 PROCEDURE — 99214 OFFICE O/P EST MOD 30 MIN: CPT | Performed by: PHYSICIAN ASSISTANT

## 2023-09-18 RX ORDER — DEXTROAMPHETAMINE SACCHARATE, AMPHETAMINE ASPARTATE, DEXTROAMPHETAMINE SULFATE AND AMPHETAMINE SULFATE 7.5; 7.5; 7.5; 7.5 MG/1; MG/1; MG/1; MG/1
30 TABLET ORAL 3 TIMES DAILY
Qty: 90 TABLET | Refills: 0 | Status: SHIPPED | OUTPATIENT
Start: 2023-10-19 | End: 2023-11-18

## 2023-09-18 RX ORDER — TIRZEPATIDE 12.5 MG/.5ML
12.5 INJECTION, SOLUTION SUBCUTANEOUS WEEKLY
Qty: 6 ML | Refills: 0 | Status: SHIPPED | OUTPATIENT
Start: 2023-09-18

## 2023-09-18 RX ORDER — DEXTROAMPHETAMINE SACCHARATE, AMPHETAMINE ASPARTATE, DEXTROAMPHETAMINE SULFATE AND AMPHETAMINE SULFATE 7.5; 7.5; 7.5; 7.5 MG/1; MG/1; MG/1; MG/1
30 TABLET ORAL 3 TIMES DAILY
Qty: 90 TABLET | Refills: 0 | Status: SHIPPED | OUTPATIENT
Start: 2023-09-18 | End: 2023-10-18

## 2023-09-18 RX ORDER — DEXTROAMPHETAMINE SACCHARATE, AMPHETAMINE ASPARTATE, DEXTROAMPHETAMINE SULFATE AND AMPHETAMINE SULFATE 7.5; 7.5; 7.5; 7.5 MG/1; MG/1; MG/1; MG/1
30 TABLET ORAL 3 TIMES DAILY
Qty: 90 TABLET | Refills: 0 | Status: SHIPPED | OUTPATIENT
Start: 2023-11-19 | End: 2023-12-19

## 2023-10-02 ENCOUNTER — HOSPITAL ENCOUNTER (OUTPATIENT)
Dept: MRI IMAGING | Age: 48
Discharge: HOME | End: 2023-10-02
Attending: Other
Payer: COMMERCIAL

## 2023-10-02 DIAGNOSIS — R29.898 LEFT HAND WEAKNESS: ICD-10-CM

## 2023-10-02 PROCEDURE — 72141 MRI NECK SPINE W/O DYE: CPT | Performed by: OTHER

## 2023-10-10 DIAGNOSIS — E66.09 CLASS 1 OBESITY DUE TO EXCESS CALORIES WITHOUT SERIOUS COMORBIDITY WITH BODY MASS INDEX (BMI) OF 30.0 TO 30.9 IN ADULT: ICD-10-CM

## 2023-10-10 NOTE — TELEPHONE ENCOUNTER
[facility] calling requesting refill of Tirzepatide [facility]) 12.5 MG/0.5ML Subcutaneous Solution Pen-injector     Send to   [facility], [address] [place] [address],[address] [place], [phone], [phone]

## 2023-10-11 DIAGNOSIS — G43.009 MIGRAINE WITHOUT AURA AND WITHOUT STATUS MIGRAINOSUS, NOT INTRACTABLE: ICD-10-CM

## 2023-10-11 RX ORDER — TIRZEPATIDE 12.5 MG/.5ML
12.5 INJECTION, SOLUTION SUBCUTANEOUS WEEKLY
Qty: 6 ML | Refills: 0 | Status: SHIPPED | OUTPATIENT
Start: 2023-10-11

## 2023-10-11 RX ORDER — RIZATRIPTAN BENZOATE 10 MG/1
10 TABLET ORAL AS NEEDED
Qty: 12 TABLET | Refills: 1 | Status: SHIPPED | OUTPATIENT
Start: 2023-10-11

## 2023-10-11 NOTE — TELEPHONE ENCOUNTER
Medication: Rizatriptan Benzoate 10 MG    Date of last refill: 8/16/23 (#12/1)  [place] 0/4/34     Last office visit: 5/19/23  Due back to clinic per last office note:  Return in about 3 months (around 8/19/2023   Date next office visit scheduled:    Future Appointments   Date Time Provider Julian Amaya   12/6/2023 11:00 AM Jacoby Aggarwal MD [facility] [place]           Last OV note recommendation:    Plan:  MRI c spine  OT ordered  Rizatriptan prn ( states nurtec not helping)  See orders and medications filed with this encounter. The patient indicates understanding of these issues and agrees with the plan. Discussed with patient regarding assessment, care plan, options for preventive and abortive meds.    RTC 2-3 months,   Pt should go ER for any new or worsening symptoms and contact office

## 2023-10-12 RX ORDER — RIZATRIPTAN BENZOATE 10 MG/1
TABLET ORAL
Qty: 12 TABLET | Refills: 1 | OUTPATIENT
Start: 2023-10-12

## 2023-10-12 NOTE — TELEPHONE ENCOUNTER
Refused. Duplicate request      Medication: RIZATRIPTAN BENZOATE 10 MG Oral Tab      Date of last refill: 10/11/2023 (#12/1)  [place]     Last office visit: 05/19/2023  Due back to clinic per last office note:  Around 08/19/2023  Date next office visit scheduled:    Future Appointments   Date Time Provider Julian Amaya   12/6/2023 11:00 AM Cortney Kerr MD [facility] [place]           Last OV note recommendation:    Plan:  MRI c spine  OT ordered  Rizatriptan prn ( states nurtec not helping)  See orders and medications filed with this encounter. The patient indicates understanding of these issues and agrees with the plan. Discussed with patient regarding assessment, care plan, options for preventive and abortive meds.    RTC 2-3 months,   Pt should go ER for any new or worsening symptoms and contact office

## 2023-11-27 ENCOUNTER — TELEPHONE (OUTPATIENT)
Dept: NEUROLOGY | Facility: CLINIC | Age: 48
End: 2023-11-27

## 2023-11-27 NOTE — TELEPHONE ENCOUNTER
Pt extremely nervous for EMG. RN assured pt that Dr. Tacho Rutherford is very well trained with EMGs. VU.      RN allowed time for pt to verbalize feelings. No further questions.

## 2023-11-28 ENCOUNTER — LAB ENCOUNTER (OUTPATIENT)
Dept: LAB | Age: 48
End: 2023-11-28
Attending: Other
Payer: COMMERCIAL

## 2023-11-28 ENCOUNTER — PROCEDURE VISIT (OUTPATIENT)
Dept: NEUROLOGY | Facility: CLINIC | Age: 48
End: 2023-11-28
Payer: COMMERCIAL

## 2023-11-28 DIAGNOSIS — R29.898 LEFT ARM WEAKNESS: Primary | ICD-10-CM

## 2023-11-28 DIAGNOSIS — R53.1 WEAKNESS: ICD-10-CM

## 2023-11-28 DIAGNOSIS — R25.3 FASCICULATIONS: ICD-10-CM

## 2023-11-28 DIAGNOSIS — R29.898 LEFT ARM WEAKNESS: ICD-10-CM

## 2023-11-28 DIAGNOSIS — G72.89: ICD-10-CM

## 2023-11-28 PROCEDURE — 86381 MITOCHONDRIAL ANTIBODY EACH: CPT

## 2023-11-28 PROCEDURE — 36415 COLL VENOUS BLD VENIPUNCTURE: CPT

## 2023-11-28 PROCEDURE — 83519 RIA NONANTIBODY: CPT

## 2023-11-28 PROCEDURE — 99215 OFFICE O/P EST HI 40 MIN: CPT | Performed by: OTHER

## 2023-11-28 NOTE — PROGRESS NOTES
[facility] Neurology Outpatient Progress Note  Date of service: 11/28/2023    Assessment:   Pt has new worsening symptoms: left hand weakness, progressively worsening since last visit  Left upper extremity paresis with fasciculation; DDX new intracranial pathology, left brachial plexopathy, MND  Migraine without aura and without status migrainosus     Plan:  MRI c spine showed disc disease which does not explain her left arm paresis  OT recommended  MG panel ordered re; slurred or weak speech   Refer to [place] [facility] Neuromuscular specialist re; to rule out MND, I prefer emg to be done by neuromuscular specialist in tertiary academic center  See orders and medications filed with this encounter. The patient indicates understanding of these issues and agrees with the plan. Discussed with patient regarding assessment, care plan   RTC prn   Pt should go ER for any new or worsening symptoms and contact office       Procedures    Myasthenia Gravis Reflex Panel    MRI BRAIN (W+WO) (CPT=70553) [[id number]]    MRI BRACHIAL PLEXUS (W+WO) SH(CPT=73220)   A total of 60 minutes were spent on patient care,  more than 50% was spent counseling patient regarding studies' results, assessment, treatment options and care plan. RN was present throughout today's office visit. Subjective:   History:  Patient here for a follow-up visit for worsening left upper extremities weakness which has been progressively worse than last visit. Muscle fasciculations noted. Also weak voice or slurred speech occasionally noticed, no difficulty swallowing. Reported left shoulder weakness with pain lately, denies trauma. Her MRI c spine was done. migraine pt states doing the same. nurtec not helping  In last office visit, Pt stated has lost fine motor ability in left hand. left hand no pain, no numbness and tingling, started in Feb. Mild action tremor noted in left hand. This is a former Dr. Rivera Records pt.  States that Ajovy did not help and could not tolerate Propranolol. Has 12-15 migraines on average per month. She failed several preventive and abortive meds in the past including Eulalia Bumpers. Headache is worse since she moved here from [place]. Has seen ENT, allergy specialist.       History/Other:   REVIEW OF SYSTEMS:  A 10-point system was reviewed. Pertinent positives and negatives are noted as above       Current Outpatient Medications:     Tirzepatide (MOUNJARO) 12.5 MG/0.5ML Subcutaneous Solution Pen-injector, Inject 12.5 mg into the skin once a week., Disp: 6 mL, Rfl: 0    Rizatriptan Benzoate 10 MG Oral Tab, Take 1 tablet (10 mg total) by mouth as needed. USE AT ONSET MAY REPEAT ONCE AFTER 2 HOURS- ONLY 2 IN 24 HOUR PERIOD MAX, Disp: 12 tablet, Rfl: 1    amphetamine-dextroamphetamine (ADDERALL) 30 MG Oral Tab, Take 1 tablet (30 mg total) by mouth 3 (three) times daily. , Disp: 90 tablet, Rfl: 0    famciclovir 250 MG Oral Tab, Take 1 tablet (250 mg total) by mouth daily. , Disp: 90 tablet, Rfl: 3    Drospirenone-Ethinyl Estradiol (GIANVI) 3-0.02 MG Oral Tab, Take 1 tablet by mouth daily. , Disp: 84 tablet, Rfl: 0    escitalopram 20 MG Oral Tab, Take 1 tablet (20 mg total) by mouth daily. , Disp: , Rfl:   Allergies: Allergies   Allergen Reactions    Sulfa Antibiotics ANAPHYLAXIS    Augmentin [Amoxicillin-Pot Clavulanate] RASH    Penicillins RASH     Past Medical History:   Diagnosis Date    ADD (attention deficit disorder)     Allergic rhinitis     Chronic migraine without aura, without mention of intractable migraine without mention of status migrainosus     Contact with or exposure to tuberculosis     Herpes simplex     lips, monthly.     Unspecified hypothyroidism      Past Surgical History:   Procedure Laterality Date    CHOLECYSTECTOMY  1994    OTHER SURGICAL HISTORY  1994    left breast lump removed- benign    OTHER SURGICAL HISTORY      gallbladder removed     Social History:  Social History     Tobacco Use    Smoking status: Never    Smokeless tobacco: Never Substance Use Topics    Alcohol use: Yes     Comment: social     Family History   Problem Relation Age of Onset    Hypertension Father     Heart Disorder Father         Helyn Bowels    Other (CAD) Father     Other (hyperlipidemia) Father     Other (hyperlipidemia) Mother     Breast Cancer Maternal Grandmother     Other (Other) Maternal Grandfather         copd    Cancer Paternal Grandmother         breast    Other (CHF) Paternal Grandmother     Other (emphysema) Paternal Grandfather     Other (CAD) Maternal Grandfather         Objective:   Mental status: A & O X 3  Language: no aphasia  Speech: no dysarthria  CN II-XII: intact   Motor strength: left UE weakness, proximal 3/5, distal 0~1 /5 (both extensors and flexors)  Muscle atrophy noted in left hand  Tone: left UE flaccid  DTRs: brisk throughout  Coordination: left FNF unable   Sensory: symmetric  Gait: normal    Test reviewed on 11/28/2023      Cristina Maki MD  Neurology  [facility]  11/28/2023, 3:11 PM  CC: Joseph Tony DO

## 2023-12-12 DIAGNOSIS — G43.009 MIGRAINE WITHOUT AURA AND WITHOUT STATUS MIGRAINOSUS, NOT INTRACTABLE: ICD-10-CM

## 2023-12-12 NOTE — TELEPHONE ENCOUNTER
Medication:  RIZATRIPTAN BENZOATE 10 MG Oral Tab      Date of last refill: 10/11/2023 (#12/1)  [place]     Last office visit: 11/28/2023  Due back to clinic per last office note:  Return if symptoms worsen or fail to improve  Date next office visit scheduled:    Future Appointments   Date Time Provider Julian Amaya   1/2/2024  1:00 PM PF MRI RM1 (1.5T) [facility] [place]   1/2/2024  2:00 PM PF MRI RM1 (1.5T) [facility] [place]           Last OV note recommendation:    Plan:  MRI c spine showed disc disease which does not explain her left arm paresis  OT recommended  MG panel ordered re; slurred or weak speech   Refer to [facility] Neuromuscular specialist re; to rule out MND, I prefer emg to be done by neuromuscular specialist in tertiary academic center  See orders and medications filed with this encounter. The patient indicates understanding of these issues and agrees with the plan.   Discussed with patient regarding assessment, care plan   RTC prn   Pt should go ER for any new or worsening symptoms and contact office

## 2023-12-13 RX ORDER — RIZATRIPTAN BENZOATE 10 MG/1
TABLET ORAL
Qty: 12 TABLET | Refills: 1 | Status: SHIPPED | OUTPATIENT
Start: 2023-12-13

## 2023-12-14 LAB
ACHR BINDING ABS: <0.03 NMOL/L
ACHR BLOCKING ABS: 23 %
ACHR-MODULATING AB: 4 %
MUSK ABS, SERUM: <1 U/ML
STRIATIONAL ABS, SERUM: NEGATIVE

## 2023-12-15 DIAGNOSIS — F98.8 ATTENTION DEFICIT DISORDER (ADD) WITHOUT HYPERACTIVITY: ICD-10-CM

## 2023-12-15 RX ORDER — DEXTROAMPHETAMINE SACCHARATE, AMPHETAMINE ASPARTATE, DEXTROAMPHETAMINE SULFATE AND AMPHETAMINE SULFATE 7.5; 7.5; 7.5; 7.5 MG/1; MG/1; MG/1; MG/1
30 TABLET ORAL 3 TIMES DAILY
Qty: 90 TABLET | Refills: 0 | Status: SHIPPED | OUTPATIENT
Start: 2023-12-15 | End: 2024-01-14

## 2023-12-15 NOTE — TELEPHONE ENCOUNTER
amphetamine-dextroamphetamine (ADDERALL) 30 MG Oral Tab       [facility] [id number] [place] Cinthia Mcduffie [phone], [phone]

## 2023-12-22 ENCOUNTER — PATIENT MESSAGE (OUTPATIENT)
Dept: NEUROLOGY | Facility: CLINIC | Age: 48
End: 2023-12-22

## 2023-12-26 NOTE — TELEPHONE ENCOUNTER
From: Wallace Morel  To: Edith Scheuermann  Sent: 12/22/2023 9:58 PM CST  Subject: Imaging    Hi, where can I view my recent & upcoming images in MyChart?     Thanks

## 2023-12-28 ENCOUNTER — TELEMEDICINE (OUTPATIENT)
Dept: FAMILY MEDICINE CLINIC | Facility: CLINIC | Age: 48
End: 2023-12-28
Payer: COMMERCIAL

## 2023-12-28 DIAGNOSIS — E66.09 CLASS 1 OBESITY DUE TO EXCESS CALORIES WITHOUT SERIOUS COMORBIDITY WITH BODY MASS INDEX (BMI) OF 30.0 TO 30.9 IN ADULT: Primary | ICD-10-CM

## 2023-12-28 DIAGNOSIS — F98.8 ATTENTION DEFICIT DISORDER (ADD) WITHOUT HYPERACTIVITY: ICD-10-CM

## 2023-12-28 PROCEDURE — 99214 OFFICE O/P EST MOD 30 MIN: CPT | Performed by: PHYSICIAN ASSISTANT

## 2023-12-28 RX ORDER — TIRZEPATIDE 10 MG/.5ML
10 INJECTION, SOLUTION SUBCUTANEOUS WEEKLY
Qty: 6 ML | Refills: 0 | Status: SHIPPED | OUTPATIENT
Start: 2023-12-28

## 2023-12-28 RX ORDER — LISDEXAMFETAMINE DIMESYLATE CAPSULES 40 MG/1
40 CAPSULE ORAL EVERY MORNING
Qty: 30 CAPSULE | Refills: 0 | Status: SHIPPED | OUTPATIENT
Start: 2023-12-28

## 2023-12-28 NOTE — PROGRESS NOTES
Video Visit    Megan Partida is a 53year old female. No chief complaint on file. HPI:   Patient presents for follow up of weight management  Currently on mounjaro 12.5 mg weekly  Getting extreme fatigue from mounjaro 12.5 mg  Was plateued in weight loss on dose of 10 mg which is why we increased it  She would like to drop it back down  Cannot tolerate topiramate  Already on stimulant, adderall for adhd  Working on diet and exercise as best she can  Would like to lose about another 10 lbs. Current Outpatient Medications   Medication Sig Dispense Refill    amphetamine-dextroamphetamine (ADDERALL) 30 MG Oral Tab Take 1 tablet (30 mg total) by mouth 3 (three) times daily. 90 tablet 0    Rizatriptan Benzoate 10 MG Oral Tab TAKE 1 TABLET BY MOUTH AS NEEDED AT ONSET MAY REPEAT ONCE AFTER 2 HOURS- ONLY 2 IN 24 HOUR MAX 12 tablet 1    Tirzepatide (MOUNJARO) 12.5 MG/0.5ML Subcutaneous Solution Pen-injector Inject 12.5 mg into the skin once a week. 6 mL 0    famciclovir 250 MG Oral Tab Take 1 tablet (250 mg total) by mouth daily. 90 tablet 3    Drospirenone-Ethinyl Estradiol (GIANVI) 3-0.02 MG Oral Tab Take 1 tablet by mouth daily. 84 tablet 0    escitalopram 20 MG Oral Tab Take 1 tablet (20 mg total) by mouth daily. Past Medical History:   Diagnosis Date    ADD (attention deficit disorder)     Allergic rhinitis     Chronic migraine without aura, without mention of intractable migraine without mention of status migrainosus     Contact with or exposure to tuberculosis     Herpes simplex     lips, monthly.     Unspecified hypothyroidism       Past Surgical History:   Procedure Laterality Date    Cholecystectomy  1994    Other surgical history  1994    left breast lump removed- benign    Other surgical history      gallbladder removed      Social History:  Social History     Socioeconomic History    Marital status: Single    Number of children: 0   Occupational History    Occupation: clinical specialist Employer: [employer]   Tobacco Use    Smoking status: Never    Smokeless tobacco: Never   Vaping Use    Vaping Use: Never used   Substance and Sexual Activity    Alcohol use: Yes     Comment: social    Drug use: No    Sexual activity: Not Currently     Partners: Male   Other Topics Concern    Caffeine Concern Yes     Comment: coffee occasionally    Exercise Yes     Comment: 2-3x weekly    Seat Belt Yes        REVIEW OF SYSTEMS:   GENERAL HEALTH: Denies fevers, chills, sweats, and fatigue. EYES: Denies vision changes, eye redness, itching, or drainage. HENT: Denies hearing loss, ear pain, nasal congestion, sinus pain, and sore throat. SKIN: Denies skin lesions and rashes. RESPIRATORY: Denies shortness of breath, wheezing, and cough. CARDIOVASCULAR: Denies chest pain, palpitations, and edema. GI: Denies abdominal pain, heartburn, nausea, vomiting, and diarrhea. : Denies dysuria, hematuria, urinary frequency, change urine color, and discharge. MUSK: Denies back pain, joint pain, neck pain, and myalgias. NEURO: Denies numbness/tingling, weakness, and headaches  ENDO: Denies polyuria, polydipsia, and tremors. ALLERGY/ASTHMA: Denies asthma and environmental allergies  HEME: Denies anemia, abnormal bleeding, and easy bruising. PSYCH: Denies anxiety and depression. EXAM:   GENERAL: well developed, well nourished, NAD. HEENT: AT/NC. Normal lips, gums, teeth, tongue. LUNGS: Speaking in complete sentences comfortably without increased work of breathing. SKIN: normal mobility and turgor. No rashes or suspicious lesions. PSYCH: Normal mood and affect, A&Ox3. ASSESSMENT AND PLAN:   1. Class 1 obesity due to excess calories without serious comorbidity with body mass index (BMI) of 30.0 to 30.9 in adult  Reduce mounjaro back down to 10 mg due to better tolerability. Will try switching her stimulant to vyvanse to see if that helps her lose the last 10 lbs. Continue with diet and exercise.  Follow up in 2-4 weeks. Sooner prn.   - lisdexamfetamine (VYVANSE) 40 MG Oral Cap; Take 1 capsule (40 mg total) by mouth every morning. Dispense: 30 capsule; Refill: 0  - Tirzepatide (MOUNJARO) 10 MG/0.5ML Subcutaneous Solution Pen-injector; Inject 10 mg into the skin once a week. Dispense: 6 mL; Refill: 0    2. Attention deficit disorder (ADD) without hyperactivity  As above. If symptoms of adhd are not as well controlled with this change, we can adjust the dose or switch back to adderall if needed. - lisdexamfetamine (VYVANSE) 40 MG Oral Cap; Take 1 capsule (40 mg total) by mouth every morning. Dispense: 30 capsule; Refill: 0        The patient indicates understanding of these issues and agrees to the plan. No follow-ups on file. Ladonna Hogan PA-C  12/28/2023  3:57 PM    Telehealth Verbal Consent   I conducted a telehealth visit with Prasanna Buenrostro today, 12/28/23, which was completed using two-way, real-time interactive audio and video communication. This has been done in good marissa to provide continuity of care in the best interest of the provider-patient relationship, due to the COVID -19 public health crisis/national emergency where restrictions of face-to-face office visits are ongoing. Every conscious effort was taken to allow for sufficient and adequate time to complete the visit. The patient was made aware of the limitations of the telehealth visit, including treatment limitations as no physical exam could be performed. The patient was advised to call 911 or to go to the ER in case there was an emergency. The patient was also advised of the potential privacy & security concerns related to the telehealth platform. The patient was made aware of where to find [facility] notice of privacy practices, telehealth consent form and other related consent forms and documents. which are located on the [facility] website.  The patient verbally agreed to telehealth consent form, related consents and the risks discussed. Lastly, the patient confirmed that they were in [place]. Included in this visit, time may have been spent reviewing labs, medications, radiology tests and decision making. Appropriate medical decision-making and tests are ordered as detailed in the plan of care above. Coding/billing information is submitted for this visit based on complexity of care and/or time spent for the visit.

## 2024-01-02 ENCOUNTER — HOSPITAL ENCOUNTER (OUTPATIENT)
Dept: MRI IMAGING | Age: 49
Discharge: HOME | End: 2024-01-02
Attending: Other
Payer: COMMERCIAL

## 2024-01-02 DIAGNOSIS — R29.898 LEFT ARM WEAKNESS: ICD-10-CM

## 2024-01-02 PROCEDURE — 73220 MRI UPPR EXTREMITY W/O&W/DYE: CPT | Performed by: OTHER

## 2024-01-02 PROCEDURE — 70553 MRI BRAIN STEM W/O & W/DYE: CPT | Performed by: OTHER

## 2024-01-02 PROCEDURE — A9575 INJ GADOTERATE MEGLUMI 0.1ML: HCPCS | Performed by: OTHER

## 2024-01-02 RX ORDER — GADOTERATE MEGLUMINE 376.9 MG/ML
15 INJECTION INTRAVENOUS
Status: DISCONTINUED | OUTPATIENT
Start: 2024-01-02 | End: 2024-01-02

## 2024-01-02 RX ORDER — GADOTERATE MEGLUMINE 376.9 MG/ML
15 INJECTION INTRAVENOUS
Status: COMPLETED | OUTPATIENT
Start: 2024-01-02 | End: 2024-01-02

## 2024-01-02 RX ADMIN — GADOTERATE MEGLUMINE 15 ML: 376.9 INJECTION INTRAVENOUS at 14:13:00

## 2024-01-23 DIAGNOSIS — B00.1 RECURRENT COLD SORES: ICD-10-CM

## 2024-01-23 DIAGNOSIS — B00.9 HERPES SIMPLEX: ICD-10-CM

## 2024-01-23 RX ORDER — FAMCICLOVIR 250 MG/1
250 TABLET ORAL DAILY
Qty: 90 TABLET | Refills: 3 | Status: SHIPPED | OUTPATIENT
Start: 2024-01-23

## 2024-01-24 NOTE — TELEPHONE ENCOUNTER
Gynecology Medication Protocol Failed 12/20/2022 02:32 PM    PASS--PENDING LAST PAP WNL--VIA MANUAL LOOKUP    Mammogram in past 12 months    Physical or Pelvic/Breast in past 12 or next 3 mos--VIA MANUAL LOOKUP        Needs to schedule in person visit . She [place] [facility] [place] Carson Barboza . RDA

## 2024-02-12 ENCOUNTER — PATIENT MESSAGE (OUTPATIENT)
Dept: FAMILY MEDICINE CLINIC | Facility: CLINIC | Age: 49
End: 2024-02-12

## 2024-02-12 DIAGNOSIS — F98.8 ATTENTION DEFICIT DISORDER (ADD) WITHOUT HYPERACTIVITY: ICD-10-CM

## 2024-02-12 RX ORDER — DEXTROAMPHETAMINE SACCHARATE, AMPHETAMINE ASPARTATE, DEXTROAMPHETAMINE SULFATE AND AMPHETAMINE SULFATE 7.5; 7.5; 7.5; 7.5 MG/1; MG/1; MG/1; MG/1
30 TABLET ORAL 3 TIMES DAILY
Qty: 90 TABLET | Refills: 0 | Status: SHIPPED | OUTPATIENT
Start: 2024-02-12 | End: 2024-03-13

## 2024-02-12 NOTE — TELEPHONE ENCOUNTER
From: Tere Rothman  To: Mary Carmen Carmichael  Sent: 2/12/2024 1:13 PM CST  Subject: Adderall at [facility] there!  The Vyvanse was great for appetite suppression, but was not helpful with attention & fatigue. Need to go back to adderall regimen. Open to discussing options on our call or just staying with this. :)  Tried to request refill but it is not listed as a medication.   [facility] [address]  Thank u!

## 2024-02-13 DIAGNOSIS — G43.009 MIGRAINE WITHOUT AURA AND WITHOUT STATUS MIGRAINOSUS, NOT INTRACTABLE: ICD-10-CM

## 2024-02-13 RX ORDER — RIZATRIPTAN BENZOATE 10 MG/1
TABLET ORAL
Qty: 12 TABLET | Refills: 2 | Status: SHIPPED | OUTPATIENT
Start: 2024-02-13

## 2024-02-13 NOTE — TELEPHONE ENCOUNTER
Medication: RIZATRIPTAN BENZOATE 10 MG Oral Tab      Date of last refill: 12/13/2023 (#12/1)  [place]     Last office visit: 11/28/2023  Due back to clinic per last office note:  RTC prn  Date next office visit scheduled:    Future Appointments   Date Time Provider Department Center   2/21/2024  2:15 PM Arcelia Carmichael PA-C [facility] [address]           Last OV note recommendation:    Plan:  MRI c spine showed disc disease which does not explain her left arm paresis  OT recommended  MG panel ordered re; slurred or weak speech   [facility] to [facility] re; to rule out MND, I prefer emg to be done by neuromuscular specialist in tertiary academic center  See orders and medications filed with this encounter. The patient indicates understanding of these issues and agrees with the plan.  Discussed with patient regarding assessment, care plan   RTC prn   Pt should go ER for any new or worsening symptoms and contact office

## 2024-02-22 ENCOUNTER — TELEPHONE (OUTPATIENT)
Dept: FAMILY MEDICINE CLINIC | Facility: CLINIC | Age: 49
End: 2024-02-22

## 2024-02-22 RX ORDER — NITROFURANTOIN 25; 75 MG/1; MG/1
100 CAPSULE ORAL 2 TIMES DAILY
Qty: 14 CAPSULE | Refills: 0 | Status: SHIPPED | OUTPATIENT
Start: 2024-02-22

## 2024-02-22 NOTE — TELEPHONE ENCOUNTER
Pt just had VV yesterday, was UTI discussed? Pt in [place], are you able to prescribe med or go to [facility] where she [facility]?

## 2024-02-22 NOTE — TELEPHONE ENCOUNTER
Pt believes she has UTI and wants something rx'd (Macrobid).  She is currently in [address]  [id number]

## 2024-02-22 NOTE — TELEPHONE ENCOUNTER
I will send macrobid but if her symptoms do not improve in 2-3 days on this, she should be evaluated

## 2024-03-04 RX ORDER — NITROFURANTOIN 25; 75 MG/1; MG/1
100 CAPSULE ORAL 2 TIMES DAILY
Qty: 14 CAPSULE | Refills: 0 | OUTPATIENT
Start: 2024-03-04

## 2024-03-07 ENCOUNTER — TRANSFERRED RECORDS (OUTPATIENT)
Dept: HEALTH INFORMATION MANAGEMENT | Facility: CLINIC | Age: 49
End: 2024-03-07
Payer: COMMERCIAL

## 2024-03-11 ENCOUNTER — TRANSFERRED RECORDS (OUTPATIENT)
Dept: HEALTH INFORMATION MANAGEMENT | Facility: CLINIC | Age: 49
End: 2024-03-11
Payer: COMMERCIAL

## 2024-03-25 DIAGNOSIS — F98.8 ATTENTION DEFICIT DISORDER (ADD) WITHOUT HYPERACTIVITY: ICD-10-CM

## 2024-03-25 NOTE — TELEPHONE ENCOUNTER
amphetamine-dextroamphetamine (ADDERALL) 30 MG Oral Tab       Pharmacy doesn't have March's rx.  Please resend

## 2024-03-27 DIAGNOSIS — M62.81 GENERALIZED MUSCLE WEAKNESS: Primary | ICD-10-CM

## 2024-03-27 RX ORDER — DEXTROAMPHETAMINE SACCHARATE, AMPHETAMINE ASPARTATE, DEXTROAMPHETAMINE SULFATE AND AMPHETAMINE SULFATE 7.5; 7.5; 7.5; 7.5 MG/1; MG/1; MG/1; MG/1
30 TABLET ORAL DAILY
Qty: 30 TABLET | Refills: 0 | Status: SHIPPED | OUTPATIENT
Start: 2024-03-27 | End: 2024-04-26

## 2024-04-03 ENCOUNTER — OFFICE VISIT (OUTPATIENT)
Dept: NEUROLOGY | Facility: CLINIC | Age: 49
End: 2024-04-03
Payer: COMMERCIAL

## 2024-04-03 VITALS
DIASTOLIC BLOOD PRESSURE: 81 MMHG | WEIGHT: 136.4 LBS | HEART RATE: 98 BPM | OXYGEN SATURATION: 98 % | SYSTOLIC BLOOD PRESSURE: 124 MMHG

## 2024-04-03 DIAGNOSIS — M75.02 ADHESIVE CAPSULITIS OF LEFT SHOULDER: ICD-10-CM

## 2024-04-03 DIAGNOSIS — G12.21 ALS (AMYOTROPHIC LATERAL SCLEROSIS) (H): Primary | ICD-10-CM

## 2024-04-03 PROCEDURE — 99205 OFFICE O/P NEW HI 60 MIN: CPT | Performed by: PSYCHIATRY & NEUROLOGY

## 2024-04-03 PROCEDURE — G2211 COMPLEX E/M VISIT ADD ON: HCPCS | Performed by: PSYCHIATRY & NEUROLOGY

## 2024-04-03 PROCEDURE — 99417 PROLNG OP E/M EACH 15 MIN: CPT | Performed by: PSYCHIATRY & NEUROLOGY

## 2024-04-03 RX ORDER — DEXTROMETHORPHAN HYDROBROMIDE AND QUINIDINE SULFATE 20; 10 MG/1; MG/1
CAPSULE, GELATIN COATED ORAL
COMMUNITY
Start: 2024-03-13

## 2024-04-03 RX ORDER — RILUZOLE 50 MG/1
50 TABLET, FILM COATED ORAL
COMMUNITY
Start: 2024-03-13

## 2024-04-03 RX ORDER — ESCITALOPRAM OXALATE 20 MG/1
0.5 TABLET ORAL DAILY
COMMUNITY

## 2024-04-03 RX ORDER — MINOXIDIL 2.5 MG/1
2.5 TABLET ORAL DAILY
COMMUNITY
Start: 2024-02-13

## 2024-04-03 RX ORDER — RIZATRIPTAN BENZOATE 10 MG/1
10 TABLET ORAL
COMMUNITY

## 2024-04-03 RX ORDER — DEXTROAMPHETAMINE SACCHARATE, AMPHETAMINE ASPARTATE, DEXTROAMPHETAMINE SULFATE AND AMPHETAMINE SULFATE 7.5; 7.5; 7.5; 7.5 MG/1; MG/1; MG/1; MG/1
TABLET ORAL
COMMUNITY

## 2024-04-03 RX ORDER — FAMCICLOVIR 250 MG/1
250 TABLET ORAL
COMMUNITY
Start: 2024-01-23

## 2024-04-03 ASSESSMENT — REVISED AMYOTROPHIC LATERAL SCLEROSIS FUNCTIONAL RATING SCALE (ALSFRS-R)
SWALLOWING: 2
HANDWRITING: SLOW OR SLOPPY: ALL WORDS ARE LEGIBLE
WALKING: 3
SPEECH: DETECTABLE SPEECH DISTURBANCES
SALIVATION: SLIGHT BUT DEFINITE EXCESS OF SALIVA IN MOUTH; MAY HAVE NIGHTTIME DROOLING
RESPIRATORY_SUBTOTAL_SCORE: 12
BULBAR_SUBTOTAL: 8
DRESSING_AND_HYGEINE: INTERMITTENT ASSISTANCE OR SUBSTITUTE METHODS
FINE_MOTOR_SUBTOTAL_SCORE: 6
CLIMBING_STAIRS: 1
HANDWRITING: 3
TURNING_IN_BED_AND_ADJUSTING_BED_CLOTHES: 2
ALSFRS_TOTAL_SCORE: 32
RESPIRATORY_INSUFFICIENCY: 4
DYSPNEA: 4
SIX_ITEM_SUBTOTAL: 17
SWALLOWING: DIETARY CONSISTENCY CHANGES
CUTTING_FOOD_AND_HANDLING_UTENSILES: 1
GROSS_MOTOR_SUBTOTAL_SCORE: 6
DRESSING_AND_HYGEINE: 2
ORTHOPENA: 4
SPEECH: 3
TURNING_IN_BED_AND_ADJUSTING_BED_CLOTHES: CAN TURN ALONE OR ADJUST SHEETS, BUT WITH GREAT DIFFICULTY
SALIVATION: 3
CLIMBING_STAIRS: NEEDS ASSISTANCE
WALKING: EARLY AMBULATION DIFFICULTIES

## 2024-04-03 ASSESSMENT — PAIN SCALES - GENERAL: PAINLEVEL: MILD PAIN (3)

## 2024-04-03 NOTE — NURSING NOTE
Chief Complaint   Patient presents with    New Patient    Als       Vitals were taken and medications were reconciled.    Kena Espinosa, Technician  3:03 PM  April 3, 2024

## 2024-04-03 NOTE — LETTER
4/3/2024       RE: Rianna Salazar  [address]  [place] [address]     Dear Colleague,    Thank you for referring your patient, Rianna Salazar, to the [facility] [place] at [facility]. Please see a copy of my visit note below.    Rianna Salazar is a 48 year old year old female with a recent diagnosis of ALS. She is self-referred for a second opinion regarding ALS management. Her history is as follows: In February 2023 she noted increased effort required when using the left hand for dextrous tasks. This subsequently progressed to hand, forearm, and arm weakness. In recent months she has also noted difficulty articulating, a change in gait, and RUE weakness. She was diagnosed with ALS on March 7 and, since then, has noted worsening gait. She has noted slight increase in saliva and has had to have her food cut into smaller pieces for safe swallowing. She denies positive or negative sensory symptoms, cognitive or behavioral symptoms, or depressed mood. She has some left shoulder discomfort and limited ROM in the fingers of the left hand. She denies orthopnea or disturbed sleep, but does have modest FLOYD. She initially lost t 30-40 pounds deliberately coincident with onset of symptoms; weight now stable and appetite good.       Today's vital signs:    /81 (BP Location: Right arm, Patient Position: Sitting, Cuff Size: Adult Small)   Pulse 98   Wt 61.9 kg (136 lb 6.4 oz)   SpO2 98%      Today's neuromuscular examination:      General Neuro Exam      Frontotemporal spectrum disorder: no    Eye movements: Normal  Sensation: Normal  Cerebellar function: Normal  Comment: Pin and light touch normal. Timed vibration 6/5 R/L       NM Exam: Cranial        Atrophy Fasciculations   Upper face: Negative Negative   Lower face: Negative Negative   Tongue: Negative Negative      Facial weakness: mild  Tongue  movements: slow  Tongue weakness: mild  Jaw jerk: present  Speech: dysarthria  Pseudobulbar affect: present  Comment: Mild slowing of diadochokinesis; minimal dysarthria; reports suggestive symptoms of PBA, though not clearly present today.       NM Exam: Axial    Neck flexion weakness: mild  Neck extension weakness: none  Trunk LMN findings       NM Exam: Upper Extremities        Right Left   Atrophy: Negative Positive   Fasciculations: Negative Positive   Muscle tone: normal hypotonic   Rapid alt. movements: slow unable to perform     Reflexes Right Left   Biceps: increased increased   Brachioradialis: increased increased   Triceps: increased increased   Santos: right Santos reflex present left Santos reflex present     Strength Right Left   * Indicates a recommended field     Shoulder abduction*: 4+ 0   Elbow flexion: 5    Elbow extension*: 5 4   Wrist extension*: 5 4   Finger extension: 4+    Finger flexion:     Abductor pollicis brevis:     First dorsal interosseous*: 5 0   Abductor digiti minimi: 4 0   Comment:       NM Exam: Lower Extremities        Right Left   Atrophy: Negative    Fasciculations: Negative    Muscle tone: normal normal   Rapid alt. movements: normal slow     Reflexes Right Left   Patellar: increased increased   Achilles: increased increased   Plantar: mute mute     Strength Right Left   * Indicates a recommended field     Hip flexion*: 5 5   Hip extension: 5 5   Hip adduction: 5 5   Hip abduction: 5 5   Knee extension*: 5 5   Knee flexion: 5 5   Ankle dorsiflexion*: 5 5   Ankle plantar flexion: 5 5   Ankle inversion:     Ankle eversion:     Toe extension:     Toe flexion:       Normal gait: no   Able to stand on heels: yes   Able to stand on toes: yes   Comment:  5 beats ankle clonus bilaterally  Difficulty with heel walking but able  Unable to sit up from supine                  Today's functional outcome measures and pulmonary function studies:    ALSFRS-R Total Score  32    FVC March  7: 88%, MIP -51 per [facility]      Summary of today's multidisciplinary clinic visit:    Mobility: developing some gait difficulty. Will need PT evaluation.  ADLs: Will need OT evaluation. Also needs left hand splint. Consider robotic arm.  Ventilation: Qualifies for NIV. Discussed benefit of NIV for QoL, energy, and survival. Discussed challenges of NIV mask as she lives alone with limited LUE function. Should not have full face mask.   Communication: Recommended SLP evaluation and discussed voice banking. Encouraged her to trial Nuedexta, which has been prescribed.   Swallowing and nutrition: Requires SLP evaluation. Discussed indications for gastrostomy.   Secretion and saliva management: Not needed at this point.  ALS research: Discussed interventional trials in detail. Suggested she reach out to [facility] as she lives in [place] and their center has numerous clinical trial opportunities.   Genetic testing: Sent 1 week ago via [employer], under [facility] provider. Given relatively young age and rate of progression, some genetic forms of ALS with ASO options come to mind.  Advance care planning: Discussed. Will need ACP documents. She expressed interest in administering treatments (death with dignity) at a later date and we discussed management options in this context.  Cognitive symptoms: Denies, and none evident in today's visit. Not formally assessed.  Behavioral health concerns: Denies, and none evident in today's visit. Query modest PBA by history only.   Pain: Has left adhesive capsulitis; will need injection and PROM if possible. Offered an external referral. She would like to wait for her follow up visit.     We also discussed financial concerns and disability. I recommended she meet with a , and can speak with our  as well. She may be eligible for certain benefits through ALS Association, and I encouraged her to contact [employer] as well. I do not know what  resources they offer to PALS.     She is interested in obtaining care here. I re-iterated that I am not licensed to provide care in formal visits outside of [place]. She anticipates having a  at a later date and currently travels widely.     Patient instructions:  I agree with the diagnosis of ALS. We discussed the diagnosis and management in detail today. Recommendations at present are as follows:    Lab today if possible, then start Riluzole.  We will provide prior authorization paperwork for Radicava at your next visit.  We will see if it possible to arrange a visit in the multidisciplinary clinic next week. I would like you to see our , PT, OT, speech therapist, ALS Association representative, and research coordinator. I will also arrange a palliative care evaluation.   Let me know the results of the genetic test when you receive it.  I recommend you complete an advance directive.  Depending upon your breathing test results, we discussed NIV's value.  Speak with a .  Regarding supplements, consider looking at [email or web address].  Regarding other reliable ALS information: als.net, alsa.org, clinicaltrials.gov    Addendum: will arrange a PMR consultation for treatment of left adhesive capsulitis at her return visit.         150 minutes spent on the date of the encounter on chart review, history and examination, documentation and further activities as noted above.         The longitudinal plan of care for ALS was addressed during this visit. Due to the added complexity in care, I will continue to support .name in the subsequent management of this condition and with the ongoing continuity of care of this condition.         Again, thank you for allowing me to participate in the care of your patient.      Sincerely,    Michael Rodriguez MD

## 2024-04-03 NOTE — PROGRESS NOTES
Rianna Salazar is a 48 year old year old female with a recent diagnosis of ALS. She is self-referred for a second opinion regarding ALS management. Her history is as follows: In 2023 she noted increased effort required when using the left hand for dextrous tasks. This subsequently progressed to hand, forearm, and arm weakness. In recent months she has also noted difficulty articulating, a change in gait, and RUE weakness. She was diagnosed with ALS on  and, since then, has noted worsening gait. She has noted slight increase in saliva and has had to have her food cut into smaller pieces for safe swallowing. She denies positive or negative sensory symptoms, cognitive or behavioral symptoms, or depressed mood. She has some left shoulder discomfort and limited ROM in the fingers of the left hand. She denies orthopnea or disturbed sleep, but does have modest FLOYD. She initially lost t 30-40 pounds deliberately coincident with onset of symptoms; weight now stable and appetite good. She denies a history of spinal injury or surgery.    Past medical history negative for major surgery, diabetes, cancer. Does not smoke or use alcohol. Not a  . Family history negative for neuromuscular disease or for early-onset neurodegenerative disease; mother  of stroke at 77; father is living at 87. One sister, no children.        Today's vital signs:    /81 (BP Location: Right arm, Patient Position: Sitting, Cuff Size: Adult Small)   Pulse 98   Wt 61.9 kg (136 lb 6.4 oz)   SpO2 98%      Today's neuromuscular examination:      General Neuro Exam      Frontotemporal spectrum disorder: no    Eye movements: Normal  Sensation: Normal  Cerebellar function: Normal  Comment: Pin and light touch normal. Timed vibration 6/5 R/L       NM Exam: Cranial        Atrophy Fasciculations   Upper face: Negative Negative   Lower face: Negative Negative   Tongue: Negative Negative      Facial weakness:  mild  Tongue movements: slow  Tongue weakness: mild  Jaw jerk: present  Speech: dysarthria  Pseudobulbar affect: present  Comment: Mild slowing of diadochokinesis; minimal dysarthria; reports suggestive symptoms of PBA, though not clearly present today.       NM Exam: Axial    Neck flexion weakness: mild  Neck extension weakness: none  Trunk LMN findings       NM Exam: Upper Extremities        Right Left   Atrophy: Negative Positive   Fasciculations: Negative Positive   Muscle tone: normal hypotonic   Rapid alt. movements: slow unable to perform     Reflexes Right Left   Biceps: increased increased   Brachioradialis: increased increased   Triceps: increased increased   Santos: right Santos reflex present left Santos reflex present     Strength Right Left   * Indicates a recommended field     Shoulder abduction*: 4+ 0   Elbow flexion: 5    Elbow extension*: 5 4   Wrist extension*: 5 4   Finger extension: 4+    Finger flexion:     Abductor pollicis brevis:     First dorsal interosseous*: 5 0   Abductor digiti minimi: 4 0   Comment:       NM Exam: Lower Extremities        Right Left   Atrophy: Negative    Fasciculations: Negative    Muscle tone: normal normal   Rapid alt. movements: normal slow     Reflexes Right Left   Patellar: increased increased   Achilles: increased increased   Plantar: mute mute     Strength Right Left   * Indicates a recommended field     Hip flexion*: 5 5   Hip extension: 5 5   Hip adduction: 5 5   Hip abduction: 5 5   Knee extension*: 5 5   Knee flexion: 5 5   Ankle dorsiflexion*: 5 5   Ankle plantar flexion: 5 5   Ankle inversion:     Ankle eversion:     Toe extension:     Toe flexion:       Normal gait: no   Able to stand on heels: yes   Able to stand on toes: yes   Comment:  5 beats ankle clonus bilaterally  Difficulty with heel walking but able  Unable to sit up from supine                  Today's functional outcome measures and pulmonary function studies:    ALSFRS-R Total Score   32    FVC  March 7: 88%, MIP -51 per [facility]      Summary of today's multidisciplinary clinic visit:    Mobility: developing some gait difficulty. Will need PT evaluation.  ADLs: Will need OT evaluation. Also needs left hand splint. Consider robotic arm.  Ventilation: Qualifies for NIV. Discussed benefit of NIV for QoL, energy, and survival, hence it is medically indicated in her condition. Discussed challenges of NIV mask as she lives alone with limited LUE function. Should not have full face mask.   Communication: Recommended SLP evaluation and discussed voice banking. Encouraged her to trial Nuedexta, which has been prescribed.   Swallowing and nutrition: Requires SLP evaluation. Discussed indications for gastrostomy.   Secretion and saliva management: Not needed at this point.  ALS research: Discussed interventional trials in detail. Suggested she reach out to [facility] as she lives in [place] and their center has numerous clinical trial opportunities.   Genetic testing: Sent 1 week ago via [facility], under [facility] provider. Given relatively young age and rate of progression, some genetic forms of ALS with ASO options come to mind.  Advance care planning: Discussed. Will need ACP documents. She expressed interest in administering treatments (death with dignity) at a later date and we discussed management options in this context.  Cognitive symptoms: Denies, and none evident in today's visit. Not formally assessed.  Behavioral health concerns: Denies, and none evident in today's visit. Query modest PBA by history only.   Pain: Has left adhesive capsulitis; will need injection and PROM if possible. Offered an external referral. She would like to wait for her follow up visit.     We also discussed financial concerns and disability. I recommended she meet with a , and can speak with our  as well. She may be eligible for certain benefits through ALS Association, and I  encouraged her to contact [facility] [place] as well. I do not know what resources they offer to PALS.     She is interested in obtaining care here. I re-iterated that I am not licensed to provide care in formal visits outside of [place]. She anticipates having a  at a later date and currently travels widely.     Patient instructions:  I agree with the diagnosis of ALS. We discussed the diagnosis and management in detail today. Recommendations at present are as follows:    Lab today if possible, then start Riluzole.  We will provide prior authorization paperwork for Radicava at your next visit.  We will see if it possible to arrange a visit in the multidisciplinary clinic next week. I would like you to see our , PT, OT, speech therapist, ALS Association representative, and research coordinator. I will also arrange a palliative care evaluation.   Let me know the results of the genetic test when you receive it.  I recommend you complete an advance directive.  Depending upon your breathing test results, we discussed NIV's value.  Speak with a .  Regarding supplements, consider looking at alsuntangled.org.  Regarding other reliable ALS information: als.net, alsa.org, clinicaltrials.gov    Addendum: will arrange a PMR consultation for treatment of left adhesive capsulitis at her return visit.       Michael Rodriguez M.D.      150 minutes spent on the date of the encounter on chart review, history and examination, documentation and further activities as noted above.         The longitudinal plan of care for ALS was addressed during this visit. Due to the added complexity in care, I will continue to support .name in the subsequent management of this condition and with the ongoing continuity of care of this condition.

## 2024-04-03 NOTE — PATIENT INSTRUCTIONS
I agree with the diagnosis of ALS. We discussed the diagnosis and management in detail today. Recommendations at present are as follows:    Lab today if possible, then start Riluzole.  We will provide prior authorization paperwork for Radicava at your next visit.  We will see if it possible to arrange a visit in the multidisciplinary clinic next week. I would like you to see our , PT, OT, speech therapist, ALS Association representative, and research coordinator. I will also arrange a palliative care evaluation.   Let me know the results of the genetic test when you receive it.  I recommend you complete an advance directive.  Depending upon your breathing test results, we discussed NIV's value.  Speak with a .  Regarding supplements, consider looking at alsuntangled.org.  Regarding other reliable ALS information: als.net, alsa.org, clinicaltrials.gov    Please call Mine @ [phone] for questions or concerns during regular business hours. For a more efficient way to communicate, use thrdPlace and address the message to your physician. Remember, Urgent Groupt is only read during business hours. Do not leave urgent messages on voicemail or thrdPlace. If situation is urgent, contact the Neurology Clinic @ [phone] and ask to speak to a Triage Nurse or Call 911 or visit an Emergency Department.     Please call your pharmacy if you need a medication refill. They will send us an electronic message.

## 2024-04-03 NOTE — Clinical Note
Patient would like a meet the team visit April 11. Suggest double-book with 9 AM video visit. She is aware I do not provide care in [place]. Needs PFT and a PMR appt same day as well. Thanks.

## 2024-04-05 ENCOUNTER — LAB (OUTPATIENT)
Dept: LAB | Facility: CLINIC | Age: 49
End: 2024-04-05
Payer: COMMERCIAL

## 2024-04-05 DIAGNOSIS — G12.21 ALS (AMYOTROPHIC LATERAL SCLEROSIS) (H): ICD-10-CM

## 2024-04-05 LAB
ALBUMIN SERPL BCG-MCNC: 4.1 G/DL (ref 3.5–5.2)
ALP SERPL-CCNC: 94 U/L (ref 40–150)
ALT SERPL W P-5'-P-CCNC: 11 U/L (ref 0–50)
ANION GAP SERPL CALCULATED.3IONS-SCNC: 10 MMOL/L (ref 7–15)
AST SERPL W P-5'-P-CCNC: 17 U/L (ref 0–45)
BILIRUB SERPL-MCNC: 0.2 MG/DL
BUN SERPL-MCNC: 14.5 MG/DL (ref 6–20)
CALCIUM SERPL-MCNC: 8.9 MG/DL (ref 8.6–10)
CHLORIDE SERPL-SCNC: 105 MMOL/L (ref 98–107)
CREAT SERPL-MCNC: 0.62 MG/DL (ref 0.51–0.95)
DEPRECATED HCO3 PLAS-SCNC: 24 MMOL/L (ref 22–29)
EGFRCR SERPLBLD CKD-EPI 2021: >90 ML/MIN/1.73M2
GLUCOSE SERPL-MCNC: 88 MG/DL (ref 70–99)
POTASSIUM SERPL-SCNC: 4 MMOL/L (ref 3.4–5.3)
PROT SERPL-MCNC: 7.1 G/DL (ref 6.4–8.3)
SODIUM SERPL-SCNC: 139 MMOL/L (ref 135–145)

## 2024-04-05 PROCEDURE — 80053 COMPREHEN METABOLIC PANEL: CPT | Performed by: PATHOLOGY

## 2024-04-05 PROCEDURE — 36415 COLL VENOUS BLD VENIPUNCTURE: CPT | Performed by: PATHOLOGY

## 2024-04-07 DIAGNOSIS — E66.09 CLASS 1 OBESITY DUE TO EXCESS CALORIES WITHOUT SERIOUS COMORBIDITY WITH BODY MASS INDEX (BMI) OF 30.0 TO 30.9 IN ADULT: ICD-10-CM

## 2024-04-08 RX ORDER — TIRZEPATIDE 10 MG/.5ML
10 INJECTION, SOLUTION SUBCUTANEOUS WEEKLY
Qty: 6 ML | Refills: 0 | Status: SHIPPED | OUTPATIENT
Start: 2024-04-08

## 2024-04-08 NOTE — TELEPHONE ENCOUNTER
A refill request was received for:  Requested Prescriptions     Pending Prescriptions Disp Refills    MOUNJARO 10 MG/0.5ML Subcutaneous Solution Pen-injector [Pharmacy Med Name: MOUNJARO (4) PEN 10/0.5] 6 mL 0     Sig: INJECT 10MG SUBCUTANEOUSLY ONCE A WEEK       Last refill date:12/28/23     Last office visit: 04/13/23      No future appointments.

## 2024-04-09 DIAGNOSIS — G12.21 ALS (AMYOTROPHIC LATERAL SCLEROSIS) (H): Primary | ICD-10-CM

## 2024-04-11 ENCOUNTER — THERAPY VISIT (OUTPATIENT)
Dept: SPEECH THERAPY | Facility: CLINIC | Age: 49
End: 2024-04-11
Payer: COMMERCIAL

## 2024-04-11 ENCOUNTER — OFFICE VISIT (OUTPATIENT)
Dept: NEUROLOGY | Facility: CLINIC | Age: 49
End: 2024-04-11
Payer: COMMERCIAL

## 2024-04-11 ENCOUNTER — LAB (OUTPATIENT)
Dept: LAB | Facility: CLINIC | Age: 49
End: 2024-04-11
Payer: COMMERCIAL

## 2024-04-11 ENCOUNTER — ALLIED HEALTH/NURSE VISIT (OUTPATIENT)
Dept: NEUROLOGY | Facility: CLINIC | Age: 49
End: 2024-04-11

## 2024-04-11 ENCOUNTER — THERAPY VISIT (OUTPATIENT)
Dept: OCCUPATIONAL THERAPY | Facility: CLINIC | Age: 49
End: 2024-04-11
Payer: COMMERCIAL

## 2024-04-11 ENCOUNTER — OFFICE VISIT (OUTPATIENT)
Dept: PULMONOLOGY | Facility: CLINIC | Age: 49
End: 2024-04-11
Payer: COMMERCIAL

## 2024-04-11 ENCOUNTER — OFFICE VISIT (OUTPATIENT)
Dept: PHYSICAL MEDICINE AND REHAB | Facility: CLINIC | Age: 49
End: 2024-04-11
Payer: COMMERCIAL

## 2024-04-11 ENCOUNTER — THERAPY VISIT (OUTPATIENT)
Dept: PHYSICAL THERAPY | Facility: CLINIC | Age: 49
End: 2024-04-11
Payer: COMMERCIAL

## 2024-04-11 VITALS
WEIGHT: 136 LBS | HEART RATE: 67 BPM | SYSTOLIC BLOOD PRESSURE: 116 MMHG | HEIGHT: 66 IN | OXYGEN SATURATION: 94 % | DIASTOLIC BLOOD PRESSURE: 75 MMHG | BODY MASS INDEX: 21.86 KG/M2

## 2024-04-11 DIAGNOSIS — G12.21 ALS (AMYOTROPHIC LATERAL SCLEROSIS) (H): Primary | ICD-10-CM

## 2024-04-11 DIAGNOSIS — Z00.6 RESEARCH STUDY PATIENT: Primary | ICD-10-CM

## 2024-04-11 DIAGNOSIS — M75.02 ADHESIVE CAPSULITIS OF LEFT SHOULDER: Primary | ICD-10-CM

## 2024-04-11 DIAGNOSIS — R13.12 OROPHARYNGEAL DYSPHAGIA: ICD-10-CM

## 2024-04-11 DIAGNOSIS — R47.1 DYSARTHRIA: ICD-10-CM

## 2024-04-11 DIAGNOSIS — M62.81 GENERALIZED MUSCLE WEAKNESS: ICD-10-CM

## 2024-04-11 DIAGNOSIS — Z78.9 DECREASED ACTIVITIES OF DAILY LIVING (ADL): ICD-10-CM

## 2024-04-11 DIAGNOSIS — Z78.9 IMPAIRED INSTRUMENTAL ACTIVITIES OF DAILY LIVING (IADL): ICD-10-CM

## 2024-04-11 LAB
EXPTIME-PRE: 5.05 SEC
FEF2575-%PRED-PRE: 82 %
FEF2575-PRE: 2.31 L/SEC
FEF2575-PRED: 2.8 L/SEC
FEFMAX-%PRED-PRE: 88 %
FEFMAX-PRE: 6.26 L/SEC
FEFMAX-PRED: 7.11 L/SEC
FEV1-%PRED-PRE: 81 %
FEV1-PRE: 2.29 L
FEV1FEV6-PRE: 82 %
FEV1FEV6-PRED: 83 %
FEV1FVC-PRE: 82 %
FEV1FVC-PRED: 81 %
FIFMAX-PRE: 3.37 L/SEC
FVC-%PRED-PRE: 80 %
FVC-PRE: 2.8 L
FVC-PRED: 3.46 L
MEP-PRE: 32 CMH2O
MIP-PRE: -37 CMH2O

## 2024-04-11 PROCEDURE — 97535 SELF CARE MNGMENT TRAINING: CPT | Mod: GO

## 2024-04-11 PROCEDURE — 99205 OFFICE O/P NEW HI 60 MIN: CPT | Performed by: PHYSICAL MEDICINE & REHABILITATION

## 2024-04-11 PROCEDURE — 99207 PR NO CHARGE LOS: CPT | Performed by: INTERNAL MEDICINE

## 2024-04-11 PROCEDURE — 92610 EVALUATE SWALLOWING FUNCTION: CPT | Mod: GN | Performed by: SPEECH-LANGUAGE PATHOLOGIST

## 2024-04-11 PROCEDURE — G2211 COMPLEX E/M VISIT ADD ON: HCPCS | Performed by: PSYCHIATRY & NEUROLOGY

## 2024-04-11 PROCEDURE — 92522 EVALUATE SPEECH PRODUCTION: CPT | Mod: GN | Performed by: SPEECH-LANGUAGE PATHOLOGIST

## 2024-04-11 PROCEDURE — 99214 OFFICE O/P EST MOD 30 MIN: CPT | Performed by: PSYCHIATRY & NEUROLOGY

## 2024-04-11 PROCEDURE — 97535 SELF CARE MNGMENT TRAINING: CPT | Mod: GP | Performed by: PHYSICAL THERAPIST

## 2024-04-11 PROCEDURE — 94799 UNLISTED PULMONARY SVC/PX: CPT | Performed by: INTERNAL MEDICINE

## 2024-04-11 PROCEDURE — 97166 OT EVAL MOD COMPLEX 45 MIN: CPT | Mod: GO

## 2024-04-11 PROCEDURE — 94375 RESPIRATORY FLOW VOLUME LOOP: CPT | Performed by: INTERNAL MEDICINE

## 2024-04-11 PROCEDURE — 97162 PT EVAL MOD COMPLEX 30 MIN: CPT | Mod: GP | Performed by: PHYSICAL THERAPIST

## 2024-04-11 NOTE — PROGRESS NOTES
SPEECH LANGUAGE PATHOLOGY EVALUATION    See electronic medical record for Abuse and Falls Screening details.    Subjective   Presenting condition or subjective complaint: ALS  Date of onset: diagnosis confirmed by Dr Rodriguez 4/3/24  Relevant medical history: Refer to medical record  Prior therapy history for the same diagnosis, illness or injury: patient will be followed in this ALS Multidisciplinary Clinic on average every 3 months. She lives in [facility] therefore will also receive care locally.  Others at Clinic Visit: None  Patient goals for therapy/concerns: Increased swallowing difficulty, Increased speech deficits, Augmentative / Alternative communication needs       Objective   Cardio-Respiratory Status: Forced vital capacity: 80 % of predicted   Height/Weight: 5'6 and 136lbs  Functional Rating Scale (ALS-FRS):  32/48 on 4/3/24  10 Meter Walk test: defer to PT  Speaking Rate: 152 wpm      Oral Motor/Swallowing  Current Diet/Method of Nutritional Intake: thin liquids (level 0), regular diet    Level of assist required for feeding: no assistance needed    Oral Motor Function: Anomalies present:    Mandibular anomaly- none  Labial anomaly- none  Lingual anomaly- mild weakness suggested on lateralization  Velar elevation- intact  Buccal strength- intact  AMR/SMR- intact    Volitional Cough: Functional  Volitional Throat Clear: Functional  Volitional Swallow: Present  Oral Hygiene: Functional    Swallow Function:  Thin Liquids: intact oral and pharyngeal swallow function noted on exam today  Regular Solids: intact oral and pharyngeal swallow function noted on exam today    Dysphagia Diagnosis: Swallow within functional limits  Diet Texture Recommendations: thin liquids (level 0), regular diet  Recommended Feeding/Eating Techniques: see description below  Medication Administration Recommendations: continue as current  Instrumental Assessment Recommendations: instrumental evaluation not recommended at this  time    Dysphagia Intervention: SLP educated in swallowing anatomy and physiology including aspiration and possible respiratory compromise from aspiration. Trained safe swallow strategies to reduce aspiration risks (small bites/sips, slow rate of intake, chin tuck/neutral head position, mindful swallowing, use caution with straws). Also trained diet modifications to reduce risk of aspiration and ease intake with focus on caution with mixed consistencies as her description of coughing with intake appear related to items such as a juicy pineapple. Additionally she describes occasional right sided pocketing of solids therefore trained strategies to clear and monitor for residue.         Speech Intelligibility/Functional Communication   Methods of communication: Verbal    Speech Intelligibility:  100% intelligible   Respiration Observations: WNL  Phonation: WNL  Resonance: WNL  Articulation: imprecise articulation  Speaking Rate: 152 words per minute per [place] reading (norm is 150-250wpm)    Motor speech level of impairment: mild impairment  Speech Intelligibility/Communication: Communicates functionally  Augmentative and Alternative Communication (AAC):  Voice Preservation as below    Communication Intervention: She is interested in Voice Preservation and previous SLP did refer her to the [facility] for more information. She has been registered with that organization but has not yet received any information regarding process of Voice Preservation. SLP explained that our local ALS Association team does provide support and equipment for Voice Preservation so if support is needed she could also contact [facility] and I provided the contact information for the SLP at [facility].       Assessment & Plan   Clinical Impressions  Medical Diagnosis: ALS  Treatment Diagnosis:  Dysarthria, Dysphagia  Impression/Assessment: Pt is a 49 year old female with ALS. The following significant findings have been identified:  impaired speech intelligibility and impaired swallowing, characterized by mild dysarthria and dysphagia. Identified deficits interfere with their ability to communicate wants and needs and consume an oral diet as compared to previous level of function.    Plan of Care  Treatment Interventions:  Swallowing dysfunction and/or oral function for feeding, Speech    Prognosis to achieve stated therapy goals is good   Rehab potential is impacted by: comorbidities    Long Term Goals: Based on today's evaluation session patient and/or caregiver will have understanding of current communication and/or swallowing status and recommendations for management.  Frequency of Treatment: one time only eval and treat within ALS Interdisciplinary Clinic  Duration of Treatment: one time only eval and treat within ALS Interdisciplinary Clinic  Recommended Referrals to Other Professionals: None     Risks and benefits of evaluation/treatment have been explained.   Patient/Family/caregiver agrees with Plan of Care.     Recommendations:  Oral diet.  Continue use of compensatory strategies.  Continue compensatory speech strategies.  Voice Preservation through LTALSF or ALSA.    Education provided/response:  Speech  Swallowing  AAC    Treatment provided this date:   Swallow intervention, 3 minutes (see above for details)  Communication intervention, 0 minutes (see above for details)    Response to recommendations/treatment: verbalized understanding, asked appropriate questions, agreed to recommendations    Goal attainment: All goals met    Total Evaluation Time (minutes): 18  Timed Code Treatment (minutes): 0 minutes  Total Treatment Time (sum of timed and untimed services): 3     Present: Not applicable     Signing Clinician: Rianna Winter, SLP

## 2024-04-11 NOTE — PROGRESS NOTES
PM&R Clinic Note     Patient Name: Rianna Salazar : 1975 Medical Record: [id number]     Requesting Physician/clinician: Dr. Rodriguez           History of Present Illness:     Rianna Salazar is a 49 year old female with history of ALS diagnosed one month back on 2023., with left sided weakness slowly progressive over the last year starting from 2023.  She has been currently seen in the multidisciplinary ALS clinic, headed by Dr. Rodriguez.  We were asked to see this patient to evaluate her shoulder pain.    Symptoms, patient reports left sided weakness, with a specific left foot drop impacting her mobility and leading to increased risk for falls.  She has a clear dorsiflexion weakness on examination today.  Specifically today she complains of left shoulder pain, describes the pain as a dull ache.  She points to the ball of her shoulder joint for location.  When asked specifically she reports that while turning in bed, her flaccid arm gets in the way and gets stuck underneath her at times.  She has a self-made pink-colored sling that she is trying to use however she reports that it results in pain on the base of the neck on the right side due to the added weight of the sling.  She shows me the way she positions the sling at times over her wrist, and at other times over the elbow.  She does note that she has increased pain when she is standing for prolonged periods of time with her flaccid arm hanging on the side.  She denies any history of adhesive capsulitis or shoulder pain prior to .    Therapies/HEP, she was seen by OT today.  ALSFS-R score is 32            Past Medical and Surgical History:     ALS diagnosed in 2024.         Social History:     Social History     Tobacco Use    Smoking status: Never    Smokeless tobacco: Never   Substance Use Topics    Alcohol use: Not on file              Medications:     Current Outpatient Medications   Medication Sig Dispense  "Refill    amphetamine-dextroamphetamine (ADDERALL) 30 MG tablet Take 1 tablet (30 mg total) by mouth daily.      escitalopram (LEXAPRO) 20 MG tablet Take 0.5 tablets by mouth daily      famciclovir (FAMVIR) 250 MG TABS tablet Take 250 mg by mouth      minoxidil (LONITEN) 2.5 MG tablet Take 2.5 mg by mouth daily      NUEDEXTA 20-10 MG capsule Take 1 tablet by mouth daily at bedtime x 7 days, then take 1 tablet twice daily,      riluzole (RILUTEK) 50 MG tablet Take 50 mg by mouth      rizatriptan (MAXALT) 10 MG tablet Take 10 mg by mouth at onset of headache              Allergies:     Allergies   Allergen Reactions    Sulfa Antibiotics Anaphylaxis    Amoxicillin-Pot Clavulanate Rash    Penicillins Rash                  Physical Examiniation:     VITAL SIGNS: There were no vitals taken for this visit.  BMI: Estimated body mass index is 21.95 kg/m  as calculated from the following:    Height as of an earlier encounter on 4/11/24: 1.676 m (5' 6\").    Weight as of an earlier encounter on 4/11/24: 61.7 kg (136 lb).    Gen: NAD, pleasant and cooperative   HEENT: Lids and lashes normal, pupils equal, round and reactive to light, extra ocular muscles intact, sclera clear, conjunctiva normal,  Cardio: regular pulse  Pulm: non-labored breathing in room air  Abd: benign  Ext: WWP, no edema in BLE, no tenderness in calves  Neuro/MSK:   She has difficulty articulating  Dysarthria  Speech at times is mildly slurred however easily understandable.  Left-sided weakness, left upper extremity is flaccid.  Left lower extremity with good antigravity strength except in dorsiflexion.  Fasciculations visible in the left upper extremity and lower extremity.  Mild atrophy noted.    Examination of the shoulder  She has 1 fingerbreadth subluxation clearly visible  Tenderness to palpation noted throughout the capsule of the joint which is being stretched  Turner test is positive           Assessment/Plan:   Rianna Salazar is a " 49-year-old female with no significant past past medical history diagnosed with ALS on March 7, who presents with left shoulder pain secondary to adhesive capsulitis.    Patient education: In depth discussion and education was provided about the assessment and implications of each of the below recommendations for management. Patient indicated readiness to learn, all questions were answered and understanding of material presented was confirmed.  We discussed several strategies on protection of the arm during turning positions while in bed or sitting.  We also discussed protecting the shoulders from hanging on the side causing pain by the use of the sling.  Work-up: No workup needed  Therapy/equipment/braces: She will benefit from a referral to orthotist to evaluate for an optimal sling which would minimize the weight borne on the right side of the neck.  Referral made and placed.  Medications: None  Interventions: She will benefit from getting a left shoulder injection consisting specifically of Kenalog 40 mg, Toradol, and 0.25% bupivacaine and a total mixture of 10 cc under fluoroscopy.  Referral made to the PM&R providers for such injection under fluoroscopy.  Patient agreeable to the plan of care.  I have counseled the patient that she may require more than 1 injections for good recovery.  She is agreeable to the plan of care.  Referral / follow up with other providers: As above  Follow up: As needed    Lotus Henson MD MHA  Physical Medicine & Rehabilitation    I spent a total of 60 minutes face-to-face with Rianna Salazar during today's office visit. Over 50% of this time was spent counseling the patient and/or coordinating care. See note for details.

## 2024-04-11 NOTE — PROGRESS NOTES
[facility]: [place]  Neuromuscular Department  ALS Association Certified Center of Excellence    Patient Name:  Rianna Salazar  MRN:  [id number]      :  1975  Date of Service:  2024  Primary care provider:  No Ref-Primary, Physician      Brief Summary:   Rianna Salazar is a 49 year old woman with ***-onset ALS who presents to the [facility] center of excellence for follow up. Genetics ***. Currently taking ***. Prominent symptoms include ***.    Interval:   ***Patient outreach      Cough: ***  Ventilation/breathing: ***  Sleep: ***  Dysarthria/Communication: ***  Dysphagia: ***  Sialorrhea: ***  Weight: ***  Mobility: ***  ADLs: ***  Spasticity: ***  Pain/Cramps: ***  ADLs: ***  Mood: ***  Pseudobulbar: ***  Cognitive: ***  Life/Care Planning: ***  Caregiver: ***  Research: ***  Genetics: ***                                                                ROS: A 10-point ROS was performed as per HPI.    PMH:  No past medical history on file.  No past surgical history on file.    Medications:      Current Outpatient Medications:     amphetamine-dextroamphetamine (ADDERALL) 30 MG tablet, Take 1 tablet (30 mg total) by mouth daily., Disp: , Rfl:     escitalopram (LEXAPRO) 20 MG tablet, Take 0.5 tablets by mouth daily, Disp: , Rfl:     famciclovir (FAMVIR) 250 MG TABS tablet, Take 250 mg by mouth, Disp: , Rfl:     minoxidil (LONITEN) 2.5 MG tablet, Take 2.5 mg by mouth daily, Disp: , Rfl:     NUEDEXTA 20-10 MG capsule, Take 1 tablet by mouth daily at bedtime x 7 days, then take 1 tablet twice daily,, Disp: , Rfl:     riluzole (RILUTEK) 50 MG tablet, Take 50 mg by mouth, Disp: , Rfl:     rizatriptan (MAXALT) 10 MG tablet, Take 10 mg by mouth at onset of headache, Disp: , Rfl:     Physical Examination:   There were no vitals taken for this visit.  Wt Readings from Last 4 Encounters:   04/03/24 61.9 kg (136 lb 6.4 oz)       General: sitting  comfortably in chair, breathing easily on room air, NAD ***  Musculoskeletal: No*** deformities of feet, hands, or limbs  Skin: Not jaundiced, no rash, no*** ecchymoses  Psych: Normal mood and affect    Neurologic Exam:                  -Sensory: intact to light touch and pinprick in all extremities.   -Coordination: intact to FNF, H2S bilaterally  -Gait: normal station, arm swing, and stride length. Able to heel and toe walk. No instability with Romberg***      Investigations:    Last PFTs  ***    ALS FRS  ***        Impression:  ***      Mobility:  ADLs:  Ventilation:  Communication:  Swallowing and nutrition:  Secretion and saliva management:  ALS research:  Genetic testing:  Advance care planning:  Cognitive symptoms:  Behavioral health concerns:  Pain:    Patient instructions:  ***          Recommendations:   - ***  - Return to ALS clinic in 3 months      Patient seen and discussed with attending neurologist, Dr. Rodriguez***      Leander Richards MD  Neuromuscular Medicine Fellow, PGY-5  [facility]

## 2024-04-11 NOTE — PATIENT INSTRUCTIONS
Stop Nuedexta.  Orthotics appointment. You should hear from [facility] Ortho dept about the referrals we sent.  Follow up appointment for your shoulder injection (Dr Elizabeth will arrange).  I will communicate with [facility] about clinical trial opportunities in [place].  Return in about 2 months, on June 13th @ 8:30 (breathing test) and 9:00 to see the Team. This is printed on your After Visit Summary.  Follow up blood test in about 1 month, after starting Riluzole. Lab orders should be taken to your local clinic to have them drawn 30 days after you start Riluzole, then again at 60 days and at 90 days. Your lab should fax the results to us at [phone].    Please call Mine @ [phone] for questions or concerns during regular business hours. For a more efficient way to communicate, use GenieBelt and address the message to your physician. Remember, MyChart is only read during business hours. Do not leave urgent messages on voicemail or GenieBelt. If situation is urgent, contact the Neurology Clinic @ [phone] and ask to speak to a Triage Nurse or Call 911 or visit an Emergency Department.     Please call your pharmacy if you need a medication refill. They will send us an electronic message.

## 2024-04-11 NOTE — Clinical Note
Mine: 1. NIV recommended. I explained that she may have to obtain from a DME supplier locally. Please determine best course of action. 2. I entered standing hepatic panel orders. 3. Reminder to work with her to move follow up to 6-8 weeks, depending upon when she is in [place]. 3 months too long at this point. Thanks.

## 2024-04-11 NOTE — PATIENT INSTRUCTIONS
Will ask Dr. Rodriguez for hand therapy orders for fabrication of L wrist splint for joint protection/function [place] at [facility] and also at [facility].   Will recommend an orthotics/prosthetic consult for shoulder brace support- ottobock or other relevant shoulder brace.  Go to [email or web address] -for shoulder/elbow PROM-enter your code: [email or web address]

## 2024-04-11 NOTE — PROGRESS NOTES
PHYSICAL THERAPY EVALUATION  Type of Visit: Evaluation    See electronic medical record for Abuse and Falls Screening details.    Subjective       Presenting condition or subjective complaint:    Patient was seen in ALS clinic today with concerns of L UE weakness and left foot drop. 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 She now drives everywhere. She does have weakness in her left arm and also in her left foot. Sometimes her left foot will drag.   She is not currently doing anything for exercise and wants to know what she can do.   She reports a few falls in the past year. She reports that she did fall a couple weeks ago when she was getting dressed. She also had a fall a couple months ago in the snow.     Date of onset: 04/11/24    Relevant medical history:     Dates & types of surgery:      Prior diagnostic imaging/testing results:       Prior therapy history for the same diagnosis, illness or injury:        Prior Level of Function  Transfers: Independent  Ambulation: Independent  ADL: Independent  IADL: Driving, Work    Living Environment  Social support:   lives alone  Type of home:   apartment  Stairs to enter the home:       0  Ramp:     Stairs inside the home:       0  Help at home:   dad, sister, friends  Equipment owned:    No equipment. Recommended single end cane and referral for left AFO.    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     Range of motion: left HC tightness,  2 beats clonus left and right      Manual muscle  testing:  Les are 5/5 throughout, except ankle L DF 4/5, L knee flex 4/5     Gait:  Patient ambulates with no assistive device. Demonstrates left foot slap, minimal left knee flexion with swing phase.      10 m walk: 6.25 sec; gait speed .96 m/sec     Five time Sit to Stand: 17.93 sec      Cognition:  Intact    Recommended Interventions: therapeutic activities, therapeutic procedures, self care/management    Treatment provided this date:   Self Care/Home Management (10 min)  -Educated patient on energy conservation techniques including pacing of activities, frequent rest breaks, use of assistive device and monitoring signs of fatigue (increased muscle soreness, weakness, cramps, fasciculations) for safe functional mobility and performance of daily tasks.   -Educated patient on exercise recommendations including focus on stretching and light cardiovascular conditioning as main modes. Educated on safe strengthening principles including endurance focus for muscle groups that have anti gravity strength. Educated patient to monitor signs for red flags after exercise (increased weakness, cramping, fasciculations) that last >30 minutes. Discussed starting with shorter exercise durations (10 min) and slowly progressing, based on tolerance. Instructed in heelcord and hamstring stretches to be performed daily.   -Educated patient on fall risk reduction techniques including energy conservations, monitoring signs of fatigue, performance of single tasks with rest breaks in between activities and use of assisted devices. Recommended referral for left AFO and using single end cane for longer distances.       Response to treatment/recommendations: Patient was receptive to all education    Goal attainment:  All goals met    Total Evaluation Time (Minutes): 22  Timed Code Treatment Minutes: 10  Total Treatment Time (sum of timed and untimed services): 32    Assessment & Plan   CLINICAL IMPRESSIONS  Medical Diagnosis: ALS    Treatment  Diagnosis: Force Production Deficit   Impression/Assessment: Patient is a 49 year old female with complaints of left UE weakness and left foot drop.  The following significant findings have been identified: Decreased strength, Impaired gait, Decreased activity tolerance, and Instability. These impairments interfere with their ability to perform self care tasks, work tasks, household chores, and community mobility as compared to previous level of function.     Clinical Decision Making (Complexity):  Clinical Presentation: Evolving/Changing  Clinical Presentation Rationale: based on medical and personal factors listed in PT evaluation  Clinical Decision Making (Complexity): Moderate complexity    PLAN OF CARE  Treatment Interventions:  Interventions: Gait Training, Neuromuscular Re-education, Therapeutic Activity, Therapeutic Exercise, Self-Care/Home Management    Long Term Goals     PT Goal 1  Goal Identifier: Understand evaluation  Goal Description: Patient, family and/or caregiver will verbalize understanding of evaluation results and implications for functional performance.  Target Date: 04/11/24  Date Met: 04/11/24  PT Goal 2  Goal Identifier: Compensatory methods/equipment  Goal Description: Patient, family and/or caregiver will verbalize/demonstrate understanding of compensatory methods/equipment to enhance functional independence and safety  Target Date: 04/11/24  Date Met: 04/11/24  PT Goal 3  Goal Identifier: Positioning/equipment  Goal Description: Patient, family and/or caregiver will verbalize/demonstrate understanding of positioning techniques/equipment  Target Date: 04/11/24  Date Met: 04/11/24  PT Goal 4  Goal Identifier: HEP  Goal Description: Patient, family and/or caregiver will verbalize/demonstrate understanding of home program  Target Date: 04/11/24  Date Met: 04/11/24  PT Goal 5  Goal Identifier: Energy Management  Goal Description: Patient, family and/or caregiver will verbalize energy  management techniques appropriate for satus and setting  Target Date: 04/11/24  Date Met: 04/11/24      Frequency of Treatment: 1 time  Duration of Treatment: 1 day    Recommended Referrals to Other Professionals:  Recommended referral to orthotics/prosthetics for L AFO  Education Assessment:   Learner/Method: Patient;Caregiver;Listening;Pictures/Video    Risks and benefits of evaluation/treatment have been explained.   Patient/Family/caregiver agrees with Plan of Care.     Evaluation Time:     PT Eval, Moderate Complexity Minutes (74791): 22     Signing Clinician: Virgie De La O PT

## 2024-04-11 NOTE — LETTER
"4/11/2024       RE: Rianna Salazar  [address]  [address]  [place] [id number]       Dear Colleague,    Thank you for referring your patient, Rianna Salazar, to the [facility] [place] [facility]. Please see a copy of my visit note below.    Return visit for 49 year old year old woman with ALS. She is here for her first multidisciplinary clinic visit.     Interval history:    No perceived benefit with Nuedexta, and feels her gait has worsened, although this may relate to a long day in the car.  No other subjective change since initial visit last week.      Today's vital signs:    /75 (BP Location: Right arm, Patient Position: Sitting, Cuff Size: Adult Regular)   Pulse 67   Ht 1.676 m (5' 6\")   Wt 61.7 kg (136 lb)   SpO2 94%   BMI 21.95 kg/m       Today's neuromuscular examination:                     NM Exam: Lower Extremities        Right Left   Atrophy: Negative Negative   Fasciculations: Negative Negative   Muscle tone: normal hypotonic   Rapid alt. movements:       Reflexes Right Left   Patellar:     Achilles:     Plantar:       Strength Right Left   * Indicates a recommended field     Hip flexion*: 5 5   Hip extension: 5 5   Hip adduction: 5 5   Hip abduction: 5 5   Knee extension*: 5 5   Knee flexion: 5 5   Ankle dorsiflexion*: 5 5   Ankle plantar flexion: 5 5   Ankle inversion:     Ankle eversion:     Toe extension:     Toe flexion:       Normal gait: yes   Comment:  Spontaneous gait is independent. 6MWT 6.25 seconds.            Today's functional outcome measures and pulmonary function studies:    ALSFRS-R Total Score  32    FVC    Lab Results   Component Value Date    20002 2.80 04/11/2024          Summary of today's multidisciplinary clinic visit:    Mobility: See above. Focused examination of lower limbs unchanged.  ADLs: see OT   Ventilation: reviewed PFT. Based upon reduced MIP, patient would " benefit from NIV. Discussed evidence of benefit in survival, QoL, and energy in ALS. I believe she will need to identify a DME supplier locally however.  Communication: see SLP note, which I personally reviewed.  Swallowing and nutrition: see SLP note, which I personally reviewed.  Secretion and saliva management: No concerns currently.  ALS research: Discussed in some detail. Enrolled in observational natural history study. She is interested in clinical trials. Currently one placebo-controlled trial is enrolling locally, but she was reluctant to pursue this as it is a 12 month trial with a 1:1 placebo:IP ratio. I explained that she would not generally qualify for EAPs if she qualifies for a placebo-controlled trial. Communicated with [facility] clinical trial coordinator as well.   Genetic testing: Result pending. Instructed to notify us when result available.  Advance care planning: Discussed. Recommended she complete an advance directive document.   Cognitive symptoms: None identified; screening not performed today.  Behavioral health concerns: Not addressed at this visit.  Pain: See PMR note.     Patient instructions:  Lab visit today.  Stop Nuedexta.  Orthotics appointment.  Follow up appointment for your shoulder injection (Dr Elizabeth will arrange).  I will ask Mine to provide you with the benefits investigation form for [employer].  Mine will provide you information about the [place] advance directive.  I will communicate with [facility] about clinical trial opportunities in [place].  Return in about 2 months.  Follow up blood test in about 1 month, after starting Riluzolle.      30 minutes spent on the date of the encounter on chart review, history and examination, documentation and further activities as noted above.        The longitudinal plan of care for ALS was addressed during this visit. Due to the added complexity in care, I will continue to support .name in the subsequent management of this  condition and with the ongoing continuity of care of this condition.           Again, thank you for allowing me to participate in the care of your patient.      Sincerely,    Michael Rodriguez MD

## 2024-04-11 NOTE — PROGRESS NOTES
"Return visit for 49 year old year old woman with ALS. She is here for her first multidisciplinary clinic visit.     Interval history:    No perceived benefit with Nuedexta, and feels her gait has worsened, although this may relate to a long day in the car.  No other subjective change since initial visit last week.      Today's vital signs:    /75 (BP Location: Right arm, Patient Position: Sitting, Cuff Size: Adult Regular)   Pulse 67   Ht 1.676 m (5' 6\")   Wt 61.7 kg (136 lb)   SpO2 94%   BMI 21.95 kg/m       Today's neuromuscular examination:                     NM Exam: Lower Extremities        Right Left   Atrophy: Negative Negative   Fasciculations: Negative Negative   Muscle tone: normal hypotonic   Rapid alt. movements:       Reflexes Right Left   Patellar:     Achilles:     Plantar:       Strength Right Left   * Indicates a recommended field     Hip flexion*: 5 5   Hip extension: 5 5   Hip adduction: 5 5   Hip abduction: 5 5   Knee extension*: 5 5   Knee flexion: 5 5   Ankle dorsiflexion*: 5 5   Ankle plantar flexion: 5 5   Ankle inversion:     Ankle eversion:     Toe extension:     Toe flexion:       Normal gait: yes   Comment:  Spontaneous gait is independent. 6MWT 6.25 seconds.            Today's functional outcome measures and pulmonary function studies:    ALSFRS-R Total Score  32    FVC    Lab Results   Component Value Date    20002 2.80 04/11/2024          Summary of today's multidisciplinary clinic visit:    Mobility: See above. Focused examination of lower limbs unchanged.  ADLs: see OT   Ventilation: reviewed PFT. Based upon reduced MIP, patient would benefit from NIV. Discussed evidence of benefit in survival, QoL, and energy in ALS. NIV, rather than BiPAP, is medically indicated because of battery back up and portability. I believe she will need to identify a DME supplier locally however.  Communication: see SLP note, which I personally reviewed.  Swallowing and nutrition: see SLP note, " which I personally reviewed.  Secretion and saliva management: No concerns currently.  ALS research: Discussed in some detail. Enrolled in observational natural history study. She is interested in clinical trials. Currently one placebo-controlled trial is enrolling locally, but she was reluctant to pursue this as it is a 12 month trial with a 1:1 placebo:IP ratio. I explained that she would not generally qualify for EAPs if she qualifies for a placebo-controlled trial. Communicated with [facility] clinical trial coordinator as well.   Genetic testing: Result pending. Instructed to notify us when result available.  Advance care planning: Discussed. Recommended she complete an advance directive document.   Cognitive symptoms: None identified; screening not performed today.  Behavioral health concerns: Not addressed at this visit.  Pain: See PMR note.     Patient instructions:  Lab visit today.  Stop Nuedexta.  Orthotics appointment.  Follow up appointment for your shoulder injection (Dr Elizabeth will arrange).  I will ask Mine to provide you with the benefits investigation form for [employer].  Mine will provide you information about the [place] advance directive.  I will communicate with [facility] about clinical trial opportunities in [place].  Return in about 2 months.  Follow up blood test in about 1 month, after starting Riluzolle.      Michael Rodriguez M.D.      30 minutes spent on the date of the encounter on chart review, history and examination, documentation and further activities as noted above.     The longitudinal plan of care for ALS was addressed during this visit. Due to the added complexity in care, I will continue to support Ms. Salazar in the subsequent management of this condition and with the ongoing continuity of care of this condition.

## 2024-04-11 NOTE — PATIENT INSTRUCTIONS
Recommendations:  I will have an order placed for a brace for your left foot/leg.  I would recommend grab bars in the bathroom  I would recommend wearing flat shoes for better balance.  It may be helpful to use a cane for walking longer distances.     Here is the link for the stretches:     https://ptrx.org/en/pevvqbxq5q/667887        Exercise recommendations with ALS    Resource for exercise recommendations: https://www.als.org/navigating-als/living-with-als/therapies-care/how-to-improve-mobility    If you are new to a form of exercise it is important to start SLOWLY and see how your body responds. Please see the directions below on how to safely exercise with ALS.    Stretching  Maintaining range of motion is important to reduce pain and tightness in weakened muscles. This should be done daily.    Your physical therapist or occupational therapist will identify a few key stretches for you to do. Each stretch should be held for 30 seconds without bouncing, 2-3 repetitions.    Cardiovascular training  Your physical therapist will help you identify the best mode of exercise for you to perform. This may change over time, so continue to follow recommendations from your therapists.    A good starting point is usually 10 mins at a slow, comfortable pace. Allow a break day in between.   - If you are more fatigued or sore, then it was too much. You need to wait until your symptoms of over-use resolve before trying again. Next time try only 5 mins and see how you feel. If symptoms persist, then likely your body won't tolerate cardiovascular training.  - If you are feeling ok the next day after doing 10 mins of exercise, then you can try it again. Repeat your exercise 3 times before increasing the time by no more than 5 mins.  - Continue to assess your symptoms along the way. If you have increased fatigue or soreness, then you will need to back up to the last level.  - The goal of cardiovascular training is continuous  movement. Don't worry about increasing speed, incline, or resistance.   - Every day may be a little different depending on your fatigue and other activities you have been doing. Listen to your body and gauge your exercise routine accordingly.    Strength  Strength training of muscles that are weakened is to be avoided, as it will just over strain them and there is potential to increase the progression of weakness in these muscles.    If you choose to strength train your non-affected  muscles, it is recommended to use a low level of weight with higher reps (10-20 reps). Make sure you have at least 1 rest day in between strength training sessions. If you notice increase in fatigue or soreness, then what you did was too much, and you should discontinue exercising those muscles.

## 2024-04-11 NOTE — PROGRESS NOTES
"OCCUPATIONAL THERAPY EVALUATION  Type of Visit: Evaluation    See electronic medical record for Abuse and Falls Screening details.    Subjective      Presenting condition or subjective complaint:   L hand weakness affecting ability to dress herself, the use of B function. Pt having difficulty managing containers/bottles/jars.   Date of onset: 04/11/244/11/24   Relevant medical history:     Dates & types of surgery:      Prior diagnostic imaging/testing results:       Prior therapy history for the same diagnosis, illness or injury:        Prior Level of Function  Transfers: Independent  Ambulation: Independent  ADL: Independent  IADL: Driving, Finances, Housekeeping, Laundry, Meal preparation, Medication management, Work    Living Environment  Social support:   lives alone  Type of home:   apartment  Stairs to enter the home:         Ramp:     Stairs inside the home:         Help at home:   dad, sister, friends  Equipment owned:    No equipment. PT recommended single end cane and referral for left AFO.    Employment:      Hobbies/Interests:      Patient goals for therapy:   Be able to figure out how to dress or manage with one hand.    Pain assessment: Pt denied     Objective      NEURO CLINIC EVALUATION    Others present at visit: None    Cardio-respiratory status: Forced vital capacity: 80 % of predicted     Height/Weight:  5'6\", 136 lbs    Technology used: Cell MVP Vault    ALS FRS:  /48- not completed this date.    Evaluation   Interview completed.     Range of motion:  WNL for RUE for all muscle groups. Passive for LUE. Shoulder tightness noted in L shoulder in abduction and flexion to approximately 80 degrees. Full PROM for L wrist and elbow. Fasciculations noted in LUE. LUE extensior tightness and pt unable to bring hand to fist due to bony joint tightness noted. Pt c/o of pain w/ PROM w/ L MCP, PIP and DIPs. Fasciculations noted in LUE and patient reports RUE fasciculations emerging but was not observable. "     Manual muscle testing: Grossly 4-5 for most muscle groups in RUE. Minimal trace in hands, trace to zero in wrist, forearm, and elbow. Pt able to demonstrated scapular retraction/protraction in gravity eliminated resistance and shoulder elevation.     Cognition:  Appears intact.     Recommended Interventions: self care/home management and therapeutic procedures    Treatment provided this date:   Self care:  Provided patient education with modified dressing techniques/clothing modifcations, AE and HEP to assist w/ management of self feeding tasks and ease with ADLs secondary to non-functional use of LUE:  [employer]SA [employer] equipment pool:  5 in 1 bottle opener  Electric Jar opener  Pt issued oval 8 to management of PIP flexion or mild boutonniere deformity for sizes 6 and 8 for digits 3,4 and 5. Pt educated on name of item and where she can purchase more as needed.   Educated on finding modified ways to dress using kaylin technique or using oversized/stretchy clothes. Also recommended slip on shoes with option of sturdy heel support to act as a shoehorn. - see [employer] for demonstration of kaylin dressing technique.   Pt also educated on passive stretches w/ assistance of family/friend. Demonstrated exercises w/ patient in supine w/ should flexion, sh abduction and finger extension on table top. Also encouraged self range of L hand into fisting as pt is able to tolerate due to discomfort with further range.     Response to treatment/recommendations: Pt was receptive and verbalized understanding with exercises.     Total Evaluation Time (Minutes): 15 minutes   Timed Code Treatment Minutes: 39 minutes  Total Treatment Time (sum of timed and untimed services): 54 minutes    Assessment & Plan   CLINICAL IMPRESSIONS  Medical Diagnosis: ALS    Treatment Diagnosis: Impaired mobility/ADLs/IADLs    Impression/Assessment: Pt is a 49 year old female presenting to Occupational Therapy due to ALS.  The following significant findings have  been identified: L hand weakness w/ fasciculations and currently non-functional.  These identified deficits interfere with their ability to perform self care tasks, work tasks, recreational activities, household chores, driving , household mobility, community mobility, medication management, financial management, meal planning and preparation, and community or volunteer activities as compared to previous level of function.     Clinical Decision Making (Complexity):  Assessment of Occupational Performance: 3-5 Performance Deficits  Occupational Performance Limitations: bathing/showering, toileting, dressing, feeding, hygiene and grooming, communication management, driving and community mobility, health management and maintenance, home establishment and management, meal preparation and cleanup, shopping, work, volunteer activities, leisure activities, and social participation  Clinical Decision Making (Complexity): Moderate complexity    PLAN OF CARE  Treatment Interventions:  Interventions: Self-Care/Home Management    Long Term Goals   OT Goal 2  Goal Identifier: Eval  Goal Description: Patient, family and/or caregiver will verbalize/demonstrate understanding of compensatory methods /equipment to enhance functional independence and safety.  Rationale: In order to maximize safety and independence with performance of self-care activities;In order to maximize safety and independence with ADL/IADLs  OT Goal 3  Goal Identifier: HEP  Goal Description: Patient, family and/or caregiver will verbalize/demonstrate understanding of home program.  Rationale: In order to maximize safety and independence with performance of self-care activities;In order to maximize safety and independence with ADL/IADLs  OT Goal 4  Goal Identifier: Positioning/equipment  Goal Description: Patient, family and/or caregiver will verbalize/demonstrate understanding of positioning techniques/equipment.  Rationale: In order to maximize safety and  independence with ADL/IADLs;In order to maximize safety and independence with performance of self-care activities  OT Goal 5  Goal Identifier: AE  Goal Description: Pt will identfy at least 2-3 AE to aid assistance w/ management of self feeding tasks.  Rationale: In order to maximize safety and independence with ADL/IADLs      Frequency of Treatment: 1x visit  Duration of Treatment: 1x     Recommended Referrals to Other Professionals: Orthotics and Hand Therapy  Education Assessment: Learner/Method: Patient;Family  Education Comments: Edu on role of OT and POC     Risks and benefits of evaluation/treatment have been explained.   Patient/Family/caregiver agrees with Plan of Care.     Evaluation Time:    OT Ileana Low Complexity Minutes (12294): 15    Signing Clinician: ROSI Wiseman

## 2024-04-11 NOTE — LETTER
2024       RE: Rianna Salazar  [address]  [place] [address]     Dear Colleague,    Thank you for referring your patient, Rianna Salazar, to the [facility] PHYSICAL MEDICINE AND REHABILITATION CLINIC [place] at [facility]. Please see a copy of my visit note below.           PM&R Clinic Note     Patient Name: Rianna Salazar : 1975 Medical Record: [id number]     Requesting Physician/clinician: Dr. Rodriguez           History of Present Illness:     Rianna Salazar is a 49 year old female with history of ALS diagnosed one month back on 2023., with left sided weakness slowly progressive over the last year starting from 2023.  She has been currently seen in the multidisciplinary ALS clinic, headed by Dr. Rodriguez.  We were asked to see this patient to evaluate her shoulder pain.    Symptoms, patient reports left sided weakness, with a specific left foot drop impacting her mobility and leading to increased risk for falls.  She has a clear dorsiflexion weakness on examination today.  Specifically today she complains of left shoulder pain, describes the pain as a dull ache.  She points to the ball of her shoulder joint for location.  When asked specifically she reports that while turning in bed, her flaccid arm gets in the way and gets stuck underneath her at times.  She has a self-made pink-colored sling that she is trying to use however she reports that it results in pain on the base of the neck on the right side due to the added weight of the sling.  She shows me the way she positions the sling at times over her wrist, and at other times over the elbow.  She does note that she has increased pain when she is standing for prolonged periods of time with her flaccid arm hanging on the side.  She denies any history of adhesive capsulitis or shoulder pain prior to .    Therapies/HEP, she was seen  "by OT today.  ALSFS-R score is 32            Past Medical and Surgical History:     ALS diagnosed in February 2024.         Social History:     Social History     Tobacco Use    Smoking status: Never    Smokeless tobacco: Never   Substance Use Topics    Alcohol use: Not on file              Medications:     Current Outpatient Medications   Medication Sig Dispense Refill    amphetamine-dextroamphetamine (ADDERALL) 30 MG tablet Take 1 tablet (30 mg total) by mouth daily.      escitalopram (LEXAPRO) 20 MG tablet Take 0.5 tablets by mouth daily      famciclovir (FAMVIR) 250 MG TABS tablet Take 250 mg by mouth      minoxidil (LONITEN) 2.5 MG tablet Take 2.5 mg by mouth daily      NUEDEXTA 20-10 MG capsule Take 1 tablet by mouth daily at bedtime x 7 days, then take 1 tablet twice daily,      riluzole (RILUTEK) 50 MG tablet Take 50 mg by mouth      rizatriptan (MAXALT) 10 MG tablet Take 10 mg by mouth at onset of headache              Allergies:     Allergies   Allergen Reactions    Sulfa Antibiotics Anaphylaxis    Amoxicillin-Pot Clavulanate Rash    Penicillins Rash                  Physical Examiniation:     VITAL SIGNS: There were no vitals taken for this visit.  BMI: Estimated body mass index is 21.95 kg/m  as calculated from the following:    Height as of an earlier encounter on 4/11/24: 1.676 m (5' 6\").    Weight as of an earlier encounter on 4/11/24: 61.7 kg (136 lb).    Gen: NAD, pleasant and cooperative   HEENT: Lids and lashes normal, pupils equal, round and reactive to light, extra ocular muscles intact, sclera clear, conjunctiva normal,  Cardio: regular pulse  Pulm: non-labored breathing in room air  Abd: benign  Ext: WWP, no edema in BLE, no tenderness in calves  Neuro/MSK:   She has difficulty articulating  Dysarthria  Speech at times is mildly slurred however easily understandable.  Left-sided weakness, left upper extremity is flaccid.  Left lower extremity with good antigravity strength except in " dorsiflexion.  Fasciculations visible in the left upper extremity and lower extremity.  Mild atrophy noted.    Examination of the shoulder  She has 1 fingerbreadth subluxation clearly visible  Tenderness to palpation noted throughout the capsule of the joint which is being stretched  Turner test is positive           Assessment/Plan:   Rianna Salazar is a 49-year-old female with no significant past past medical history diagnosed with ALS on March 7, who presents with left shoulder pain secondary to adhesive capsulitis.    Patient education: In depth discussion and education was provided about the assessment and implications of each of the below recommendations for management. Patient indicated readiness to learn, all questions were answered and understanding of material presented was confirmed.  We discussed several strategies on protection of the arm during turning positions while in bed or sitting.  We also discussed protecting the shoulders from hanging on the side causing pain by the use of the sling.  Work-up: No workup needed  Therapy/equipment/braces: She will benefit from a referral to orthotist to evaluate for an optimal sling which would minimize the weight borne on the right side of the neck.  Referral made and placed.  Medications: None  Interventions: She will benefit from getting a left shoulder injection consisting specifically of Kenalog 40 mg, Toradol, and 0.25% bupivacaine and a total mixture of 10 cc under fluoroscopy.  Referral made to the PM&R providers for such injection under fluoroscopy.  Patient agreeable to the plan of care.  I have counseled the patient that she may require more than 1 injections for good recovery.  She is agreeable to the plan of care.  Referral / follow up with other providers: As above  Follow up: As needed      I spent a total of 60 minutes face-to-face with Rianna Salazar during today's office visit. Over 50% of this time was spent counseling the  patient and/or coordinating care. See note for details.         Again, thank you for allowing me to participate in the care of your patient.      Sincerely,    Lotus Henson MD

## 2024-04-12 DIAGNOSIS — G12.21 ALS (AMYOTROPHIC LATERAL SCLEROSIS) (H): Primary | ICD-10-CM

## 2024-04-15 DIAGNOSIS — G12.21 ALS (AMYOTROPHIC LATERAL SCLEROSIS) (H): Primary | ICD-10-CM

## 2024-04-17 ENCOUNTER — TELEPHONE (OUTPATIENT)
Dept: FAMILY MEDICINE CLINIC | Facility: CLINIC | Age: 49
End: 2024-04-17

## 2024-04-19 NOTE — NURSING NOTE
Registry/Observational Study     Patient was offered participation in the Multi-Site Natural History of ALS and other motor neuron disorders registry. The consent and HIPAA form was discussed and   and explained to the patient. It was discussed that involvement with the study is voluntary and refusal to participate would not involve penalty or decrease benefits at which the patient is entitled, and the subject may discontinue his/her involvement at any time without penalty or loss in benefits. The patient was given time to review and ask any questions about the consent. Patient verbalized understanding of the study. A copy of the signed consent was given to the patient for their records.     Subject Informed Consent and Authorization Form: SIGNED ON 04/11/2024    Multi-Site Natural History of ALS and other motor neuron disorders  PI: Michael Rodriguez MD  : Yuli Cardona [email or web address]

## 2024-04-21 DIAGNOSIS — B00.9 HERPES SIMPLEX: ICD-10-CM

## 2024-04-21 DIAGNOSIS — B00.1 RECURRENT COLD SORES: ICD-10-CM

## 2024-04-22 ENCOUNTER — TELEPHONE (OUTPATIENT)
Dept: NEUROLOGY | Facility: CLINIC | Age: 49
End: 2024-04-22
Payer: COMMERCIAL

## 2024-04-22 RX ORDER — FAMCICLOVIR 250 MG/1
250 TABLET ORAL DAILY
Qty: 90 TABLET | Refills: 3 | Status: SHIPPED | OUTPATIENT
Start: 2024-04-22

## 2024-04-22 NOTE — TELEPHONE ENCOUNTER
[facility] Call Center    Phone Message    May a detailed message be left on voicemail: yes     Reason for Call: Nayeli from [facility] requests a call back from [place] of Dr. Rodriguez's office to discuss a non invasive ventilator.  Please call Nayeli at      Action Taken: [facility]    Travel Screening: Not Applicable

## 2024-04-24 ENCOUNTER — TELEMEDICINE (OUTPATIENT)
Dept: FAMILY MEDICINE CLINIC | Facility: CLINIC | Age: 49
End: 2024-04-24
Payer: COMMERCIAL

## 2024-04-24 DIAGNOSIS — G12.21 ALS (AMYOTROPHIC LATERAL SCLEROSIS) (HCC): Primary | ICD-10-CM

## 2024-04-24 DIAGNOSIS — F98.8 ATTENTION DEFICIT DISORDER (ADD) WITHOUT HYPERACTIVITY: ICD-10-CM

## 2024-04-24 DIAGNOSIS — R09.81 NASAL CONGESTION: ICD-10-CM

## 2024-04-24 PROCEDURE — 99214 OFFICE O/P EST MOD 30 MIN: CPT | Performed by: PHYSICIAN ASSISTANT

## 2024-04-24 RX ORDER — DEXTROAMPHETAMINE SACCHARATE, AMPHETAMINE ASPARTATE, DEXTROAMPHETAMINE SULFATE AND AMPHETAMINE SULFATE 7.5; 7.5; 7.5; 7.5 MG/1; MG/1; MG/1; MG/1
30 TABLET ORAL 3 TIMES DAILY
Qty: 90 TABLET | Refills: 0 | Status: SHIPPED | OUTPATIENT
Start: 2024-06-25 | End: 2024-07-25

## 2024-04-24 RX ORDER — DEXTROAMPHETAMINE SACCHARATE, AMPHETAMINE ASPARTATE, DEXTROAMPHETAMINE SULFATE AND AMPHETAMINE SULFATE 7.5; 7.5; 7.5; 7.5 MG/1; MG/1; MG/1; MG/1
30 TABLET ORAL 3 TIMES DAILY
Qty: 90 TABLET | Refills: 0 | Status: SHIPPED | OUTPATIENT
Start: 2024-04-24 | End: 2024-05-24

## 2024-04-24 RX ORDER — AZELASTINE 1 MG/ML
2 SPRAY, METERED NASAL 2 TIMES DAILY
Qty: 30 ML | Refills: 3 | Status: SHIPPED | OUTPATIENT
Start: 2024-04-24

## 2024-04-24 RX ORDER — DEXTROAMPHETAMINE SACCHARATE, AMPHETAMINE ASPARTATE, DEXTROAMPHETAMINE SULFATE AND AMPHETAMINE SULFATE 7.5; 7.5; 7.5; 7.5 MG/1; MG/1; MG/1; MG/1
30 TABLET ORAL 3 TIMES DAILY
Qty: 90 TABLET | Refills: 0 | Status: SHIPPED | OUTPATIENT
Start: 2024-05-25 | End: 2024-06-24

## 2024-04-24 RX ORDER — FLUTICASONE PROPIONATE 50 MCG
2 SPRAY, SUSPENSION (ML) NASAL DAILY
Qty: 3 EACH | Refills: 3 | Status: SHIPPED | OUTPATIENT
Start: 2024-04-24

## 2024-04-24 NOTE — PROGRESS NOTES
Video Visit    Tere Rothman is a 49 year old female.  No chief complaint on file.      HPI:   Patient presents to follow up on a few concerns:    She was recently diagnosed with ALS  Her symptoms have been progressing  Mainly presents as left sided weakness and tremor  Also has reported dyspnea, tripping/fall  Now having more loss of voice and difficulty speaking  Would like to join a clinical trial through [facility] clinic and needs a referral    H/o ADHD  On adderall 30 mg TID and working well  Helps combat a lot of the fatigue she has been experiencing  No side effects  Also helps with focus  Would like to continue same dose    Also c/o increased nasal congestion, rhinorrhea, PND  Requests nasal spray rx  No fever/chills      Current Outpatient Medications   Medication Sig Dispense Refill    famciclovir 250 MG Oral Tab Take 1 tablet (250 mg total) by mouth daily. 90 tablet 3    Tirzepatide (MOUNJARO) 10 MG/0.5ML Subcutaneous Solution Pen-injector Inject 10 mg into the skin once a week. 6 mL 0    amphetamine-dextroamphetamine (ADDERALL) 30 MG Oral Tab Take 1 tablet (30 mg total) by mouth daily. 30 tablet 0    nitrofurantoin monohydrate macro 100 MG Oral Cap Take 1 capsule (100 mg total) by mouth 2 (two) times daily. 14 capsule 0    amphetamine-dextroamphetamine (ADDERALL) 30 MG Oral Tab Take 1 tablet (30 mg total) by mouth daily. 30 tablet 0    Rizatriptan Benzoate 10 MG Oral Tab TAKE 1 TABLET BY MOUTH AS NEEDED AT ONSET MAY REPEAT ONCE AFTER 2 HOURS- ONLY 2 IN 24 HOUR MAX 12 tablet 2    Drospirenone-Ethinyl Estradiol (GIANVI) 3-0.02 MG Oral Tab Take 1 tablet by mouth daily. 84 tablet 0    escitalopram 20 MG Oral Tab Take 1 tablet (20 mg total) by mouth daily.        Past Medical History:    ADD (attention deficit disorder)    Allergic rhinitis    Chronic migraine without aura, without mention of intractable migraine without mention of status migrainosus    Contact with or exposure to tuberculosis     Herpes simplex    lips, monthly.    Unspecified hypothyroidism      Past Surgical History:   Procedure Laterality Date    Cholecystectomy  1994    Other surgical history  1994    left breast lump removed- benign    Other surgical history      gallbladder removed      Social History:  Social History     Socioeconomic History    Marital status: Single    Number of children: 0   Occupational History    Occupation: clinical specialist     Employer: [employer]   Tobacco Use    Smoking status: Never    Smokeless tobacco: Never   Vaping Use    Vaping status: Never Used   Substance and Sexual Activity    Alcohol use: Yes     Comment: social    Drug use: No    Sexual activity: Not Currently     Partners: Male   Other Topics Concern    Caffeine Concern Yes     Comment: coffee occasionally    Exercise Yes     Comment: 2-3x weekly    Seat Belt Yes     Social Determinants of Health      [facility]    Housing Stability        REVIEW OF SYSTEMS:   GENERAL HEALTH: +fatigue.  EYES: Denies vision changes, eye redness, itching, or drainage.   HENT: +nasal congestion, rhinorrhea, PND, hoarseness  SKIN: Denies skin lesions and rashes.  RESPIRATORY: +dyspnea  CARDIOVASCULAR: Denies chest pain, palpitations, and edema.  GI: Denies abdominal pain, heartburn, nausea, vomiting, and diarrhea.  : Denies dysuria, hematuria, urinary frequency, change urine color, and discharge.  MUSK: Denies back pain, joint pain, neck pain, and myalgias.  NEURO: +weakness, speech difficulty, tremor  ENDO: Denies polyuria, polydipsia  ALLERGY/ASTHMA: Denies asthma and environmental allergies  HEME: Denies anemia, abnormal bleeding, and easy bruising.  PSYCH: Denies anxiety and depression.      EXAM:   GENERAL: well developed, well nourished, NAD.  HEENT: +congested on exam. AT/NC. Normal lips, gums, teeth, tongue.   LUNGS: Speaking in complete sentences comfortably without increased work of  breathing.  SKIN: normal mobility and turgor. No rashes or suspicious lesions.   PSYCH: Normal mood and affect, A&Ox3.      ASSESSMENT AND PLAN:   1. ALS (amyotrophic lateral sclerosis) (HCC)  Patient has recently been diagnosed with ALS that is progressing over time. Referral given for participation in clinical trial at [facility] clinic.  - Specialty Other Referral - External    2. Nasal congestion  Trial of nasal sprays and ongoing supportive measures. Contact the office if symptoms worsen or do not improve in the coming days.   - fluticasone propionate 50 MCG/ACT Nasal Suspension; 2 sprays by Each Nare route daily.  Dispense: 3 each; Refill: 3  - azelastine 0.1 % Nasal Solution; 2 sprays by Nasal route 2 (two) times daily.  Dispense: 30 mL; Refill: 3    3. Attention deficit disorder (ADD) without hyperactivity  Stable. Cpm. Refills given. Follow up in 6 months. Sooner prn.   - amphetamine-dextroamphetamine (ADDERALL) 30 MG Oral Tab; Take 1 tablet (30 mg total) by mouth 3 (three) times daily.  Dispense: 90 tablet; Refill: 0  - amphetamine-dextroamphetamine (ADDERALL) 30 MG Oral Tab; Take 1 tablet (30 mg total) by mouth 3 (three) times daily.  Dispense: 90 tablet; Refill: 0  - amphetamine-dextroamphetamine (ADDERALL) 30 MG Oral Tab; Take 1 tablet (30 mg total) by mouth 3 (three) times daily.  Dispense: 90 tablet; Refill: 0        The patient indicates understanding of these issues and agrees to the plan.    No follow-ups on file.      Mary Carmen Carmichael PA-C  4/24/2024  8:05 AM    Telehealth Verbal Consent   I conducted a telehealth visit with Tere Rothman today, 04/24/24, which was completed using two-way, real-time interactive audio and video communication. This has been done in good marissa to provide continuity of care in the best interest of the provider-patient relationship, due to the COVID -19 public health crisis/national emergency where restrictions of face-to-face office visits are ongoing.  Every conscious effort was taken to allow for sufficient and adequate time to complete the visit.  The patient was made aware of the limitations of the telehealth visit, including treatment limitations as no physical exam could be performed.  The patient was advised to call 911 or to go to the ER in case there was an emergency.  The patient was also advised of the potential privacy & security concerns related to the telehealth platform.   The patient was made aware of where to find [facility]'s notice of privacy practices, telehealth consent form and other related consent forms and documents.  which are located on the [facility] website. The patient verbally agreed to telehealth consent form, related consents and the risks discussed.    Lastly, the patient confirmed that they were in [place].   Included in this visit, time may have been spent reviewing labs, medications, radiology tests and decision making. Appropriate medical decision-making and tests are ordered as detailed in the plan of care above.  Coding/billing information is submitted for this visit based on complexity of care and/or time spent for the visit.

## 2024-04-25 NOTE — TELEPHONE ENCOUNTER
[facility] Call Center     Phone Message     May a detailed message be left on voicemail: yes      Reason for Call: Chris from [facility] requesting that the order date be placed for the Pt's invasive ventilator  Please fax order to [phone].    Please call Chris at [phone] with additional questions      Action Taken: [facility]     Travel Screening: Not Applicable

## 2024-04-30 NOTE — TELEPHONE ENCOUNTER
[facility] Call Center    Phone Message    May a detailed message be left on voicemail: no     Reason for Call: Other: Please review previous TE from 4/25 requesting missing information on a recently faxed form to [facility] .      Please  reach out to Chris with questions  # [phone]     Action Taken: Other: Neurology    Travel Screening: Not Applicable

## 2024-05-01 ENCOUNTER — PATIENT OUTREACH (OUTPATIENT)
Dept: CARE COORDINATION | Facility: CLINIC | Age: 49
End: 2024-05-01
Payer: COMMERCIAL

## 2024-05-06 NOTE — PROGRESS NOTES
Social Work - Telephone/MyChart message  [facility]  Data:   Patient Name: Rianna Salazar  Goes By: Rianna    /Age: 1975 (49 year old)      Referral Source: ALS clinic    Reason for Referral: psychosocial assessment    Intervention: Left voice message for patient on 2024 .   Plan:  will await patient's return phone call/message and provide assistance at that time.      NELLIE Burris, [facility]    [facility]  [address] [place] [id number]    [phone]

## 2024-05-10 DIAGNOSIS — G43.009 MIGRAINE WITHOUT AURA AND WITHOUT STATUS MIGRAINOSUS, NOT INTRACTABLE: ICD-10-CM

## 2024-05-10 RX ORDER — RIZATRIPTAN BENZOATE 10 MG/1
TABLET ORAL
Qty: 12 TABLET | Refills: 0 | Status: SHIPPED | OUTPATIENT
Start: 2024-05-10 | End: 2024-07-11

## 2024-05-10 NOTE — TELEPHONE ENCOUNTER
Medication: RIZATRIPTAN BENZOATE 10 MG Oral Tab      Date of last refill: 02/13/2024 (#12/2)  [place]     Last office visit: 11/28/2023  Due back to clinic per last office note:  RTC prn  Date next office visit scheduled:    No future appointments.        Last OV note recommendation:    Plan:  MRI c spine showed disc disease which does not explain her left arm paresis  OT recommended  MG panel ordered re; slurred or weak speech   [facility] to [facility] re; to rule out MND, I prefer emg to be done by neuromuscular specialist in tertiary academic center  See orders and medications filed with this encounter. The patient indicates understanding of these issues and agrees with the plan.  Discussed with patient regarding assessment, care plan   RTC prn   Pt should go ER for any new or worsening symptoms and contact office

## 2024-05-19 ENCOUNTER — HEALTH MAINTENANCE LETTER (OUTPATIENT)
Age: 49
End: 2024-05-19

## 2024-06-11 DIAGNOSIS — E66.09 CLASS 1 OBESITY DUE TO EXCESS CALORIES WITHOUT SERIOUS COMORBIDITY WITH BODY MASS INDEX (BMI) OF 30.0 TO 30.9 IN ADULT: ICD-10-CM

## 2024-06-11 DIAGNOSIS — G12.21 ALS (AMYOTROPHIC LATERAL SCLEROSIS) (H): Primary | ICD-10-CM

## 2024-06-11 NOTE — TELEPHONE ENCOUNTER
A refill request was received for:  Requested Prescriptions     Pending Prescriptions Disp Refills    MOUNJARO 10 MG/0.5ML Subcutaneous Solution Pen-injector [Pharmacy Med Name: MOUNJARO (4) PEN 10/0.5] 6 mL 0     Sig: INJECT 10MG SUBCUTANEOUSLY ONCE A WEEK       Last refill date:   4/8/2024    Last office visit: 12/28/2023    Follow up due:  No future appointments.

## 2024-06-12 RX ORDER — TIRZEPATIDE 10 MG/.5ML
10 INJECTION, SOLUTION SUBCUTANEOUS WEEKLY
Qty: 6 ML | Refills: 0 | Status: SHIPPED | OUTPATIENT
Start: 2024-06-12

## 2024-07-11 DIAGNOSIS — G43.009 MIGRAINE WITHOUT AURA AND WITHOUT STATUS MIGRAINOSUS, NOT INTRACTABLE: ICD-10-CM

## 2024-07-11 RX ORDER — RIZATRIPTAN BENZOATE 10 MG/1
TABLET ORAL
Qty: 12 TABLET | Refills: 0 | Status: SHIPPED | OUTPATIENT
Start: 2024-07-11

## 2024-07-11 NOTE — TELEPHONE ENCOUNTER
Medication: Rizatriptan Benzoate 10 MG Oral Tab      Date of last refill: 05/10/2024 (#12/0)  [place]     Last office visit: 05/19/2023  Due back to clinic per last office note:  08/19/2023  Date next office visit scheduled:    No future appointments.        Last OV note recommendation:       Assessment:   Pt has new worsening symptoms: left hand weakness and incoordination     Migraine without aura and without status migrainosus  Left hand weakness and incoordination  Left hand tremor, action, mild     Plan:  MRI c spine  OT ordered  Rizatriptan prn ( states nurtec not helping)  See orders and medications filed with this encounter. The patient indicates understanding of these issues and agrees with the plan.  Discussed with patient regarding assessment, care plan, options for preventive and abortive meds.   RTC 2-3 months,   Pt should go ER for any new or worsening symptoms and contact office

## 2024-08-01 ENCOUNTER — OFFICE VISIT (OUTPATIENT)
Dept: FAMILY MEDICINE CLINIC | Facility: CLINIC | Age: 49
End: 2024-08-01
Payer: COMMERCIAL

## 2024-08-01 VITALS — OXYGEN SATURATION: 95 % | DIASTOLIC BLOOD PRESSURE: 74 MMHG | HEART RATE: 96 BPM | SYSTOLIC BLOOD PRESSURE: 116 MMHG

## 2024-08-01 DIAGNOSIS — G12.20 MOTOR NEURON DISEASE (HCC): ICD-10-CM

## 2024-08-01 DIAGNOSIS — Z91.89 AT HIGH RISK FOR ASPIRATION: ICD-10-CM

## 2024-08-01 DIAGNOSIS — L21.9 SEBORRHEIC DERMATITIS: ICD-10-CM

## 2024-08-01 DIAGNOSIS — R47.9 DIFFICULTY WITH SPEECH: ICD-10-CM

## 2024-08-01 DIAGNOSIS — R09.82 PND (POST-NASAL DRIP): ICD-10-CM

## 2024-08-01 DIAGNOSIS — R29.898 BILATERAL ARM WEAKNESS: ICD-10-CM

## 2024-08-01 DIAGNOSIS — Z74.1 REQUIRES ASSISTANCE WITH ACTIVITIES OF DAILY LIVING (ADL): ICD-10-CM

## 2024-08-01 DIAGNOSIS — R29.898 BILATERAL LEG WEAKNESS: ICD-10-CM

## 2024-08-01 DIAGNOSIS — G12.21 ALS (AMYOTROPHIC LATERAL SCLEROSIS) (HCC): Primary | ICD-10-CM

## 2024-08-01 DIAGNOSIS — R26.9 GAIT DISORDER: ICD-10-CM

## 2024-08-01 PROCEDURE — 3074F SYST BP LT 130 MM HG: CPT | Performed by: PHYSICIAN ASSISTANT

## 2024-08-01 PROCEDURE — 3078F DIAST BP <80 MM HG: CPT | Performed by: PHYSICIAN ASSISTANT

## 2024-08-01 PROCEDURE — 99214 OFFICE O/P EST MOD 30 MIN: CPT | Performed by: PHYSICIAN ASSISTANT

## 2024-08-01 RX ORDER — KETOCONAZOLE 20 MG/ML
1 SHAMPOO TOPICAL
Qty: 120 ML | Refills: 2 | Status: SHIPPED | OUTPATIENT
Start: 2024-08-01

## 2024-08-01 RX ORDER — DROSPIRENONE 4 MG/1
1 TABLET, FILM COATED ORAL DAILY
Qty: 90 TABLET | Refills: 3 | Status: SHIPPED | OUTPATIENT
Start: 2024-08-01

## 2024-08-01 RX ORDER — FEXOFENADINE HCL AND PSEUDOEPHEDRINE HCL 180; 240 MG/1; MG/1
1 TABLET, EXTENDED RELEASE ORAL DAILY
Qty: 90 TABLET | Refills: 0 | Status: SHIPPED | OUTPATIENT
Start: 2024-08-01

## 2024-08-01 RX ORDER — KETOCONAZOLE 20 MG/G
1 CREAM TOPICAL DAILY
Qty: 60 G | Refills: 2 | Status: SHIPPED | OUTPATIENT
Start: 2024-08-01

## 2024-08-01 NOTE — PROGRESS NOTES
Subjective:   Patient ID: Tere Rothman is a 49 year old female.    HPI  Patient presents for follow up of chronic conditions:    She was diagnosed with ALS earlier this year  Her condition has rapidly progressed since onset  Currently she is wheelchair bound  She is almost completely dependent for all ADLs  At this point she can only grasp a cup and bring to her mouth  All other ADLS require assistance of at least one other person  She has significant proximal upper and lower extremity weakness  Her speech is becoming more difficult  Swallowing is difficult, will be undergoing swallow study soon  Also having difficulty clearing phlegm and PND, coughing frequently  Will be seeing pulmonology soon  She is requesting medication to suppress menses as she can no longer tend to this on her own as it comes  She lives alone and is in great need of home care including PT, OT, ST, and home aide    Also c/o skin dryness, thick scaly plaques, redness/itching of the nasolabial folds, hair line, scalp and periauricular areas  Nothing seems to help with this      History/Other:   Review of Systems   Constitutional:  Positive for fatigue. Negative for chills and fever.   HENT:  Positive for postnasal drip. Negative for congestion, ear pain, rhinorrhea and sore throat.    Eyes:  Negative for visual disturbance.   Respiratory:  Positive for cough. Negative for shortness of breath and wheezing.    Cardiovascular:  Negative for chest pain, palpitations and leg swelling.   Gastrointestinal:  Negative for abdominal pain, diarrhea, nausea and vomiting.   Genitourinary:  Positive for menstrual problem. Negative for dysuria, frequency and hematuria.   Musculoskeletal:  Positive for gait problem. Negative for arthralgias and myalgias.   Skin:  Positive for rash.   Neurological:  Positive for speech difficulty and weakness. Negative for light-headedness and headaches.   Hematological:  Negative for adenopathy.    Psychiatric/Behavioral:  Negative for dysphoric mood. The patient is not nervous/anxious.      Current Outpatient Medications   Medication Sig Dispense Refill    Rizatriptan Benzoate 10 MG Oral Tab TAKE 1 TABLET BY MOUTH AS NEEDED AT ONSET MAY REPEAT ONCE AFTER 2 HOURS- ONLY 2 IN 24 HOUR MAX 12 tablet 0    MOUNJARO 10 MG/0.5ML Subcutaneous Solution Pen-injector INJECT 10MG SUBCUTANEOUSLY ONCE A WEEK 6 mL 0    fluticasone propionate 50 MCG/ACT Nasal Suspension 2 sprays by Each Nare route daily. 3 each 3    azelastine 0.1 % Nasal Solution 2 sprays by Nasal route 2 (two) times daily. 30 mL 3    famciclovir 250 MG Oral Tab Take 1 tablet (250 mg total) by mouth daily. 90 tablet 3    nitrofurantoin monohydrate macro 100 MG Oral Cap Take 1 capsule (100 mg total) by mouth 2 (two) times daily. 14 capsule 0    Drospirenone-Ethinyl Estradiol (GIANVI) 3-0.02 MG Oral Tab Take 1 tablet by mouth daily. 84 tablet 0    escitalopram 20 MG Oral Tab Take 1 tablet (20 mg total) by mouth daily.       Allergies:  Allergies   Allergen Reactions    Sulfa Antibiotics ANAPHYLAXIS    Augmentin [Amoxicillin-Pot Clavulanate] RASH    Penicillins RASH       Objective:   Physical Exam  Vitals and nursing note reviewed. Exam conducted with a chaperone present.   HENT:      Head: Normocephalic and atraumatic.   Skin:     Findings: Rash (erythematous, dry/scaly rash of the nasolabial folds, along the entire hairline, on the scalp, and periauricular areas) present.   Neurological:      Mental Status: She is alert and oriented to person, place, and time.      Cranial Nerves: Cranial nerve deficit present.      Motor: Weakness, atrophy and abnormal muscle tone present.      Gait: Gait abnormal.   Psychiatric:         Mood and Affect: Mood normal.         Behavior: Behavior normal.         Assessment & Plan:   1. ALS (amyotrophic lateral sclerosis) (HCC)  2. Motor neuron disease (HCC)  3. Bilateral leg weakness  4. Bilateral arm weakness  5. Gait  disorder  6. Requires assistance with activities of daily living (ADL)  7. Difficulty with speech  8. At high risk for aspiration  Patient condition is rapidly progressing. She is dependent for all ADLs.  She requires home health including PT, OT, speech, likely RN, and home aide. Referral placed. Ongoing follow up with neurology of [facility].  - [facility] REFERRAL  - Home Health Referral - In Network    9. PND (post-nasal drip)  Trial of allegra-D to help clear mucous/PND and lessen risk of infection/aspiration.  - Fexofenadine-Pseudoephed -240 MG Oral Tablet 24 Hr; Take 1 tablet by mouth daily.  Dispense: 90 tablet; Refill: 0    10. Seborrheic dermatitis  Trial of topical ketoconazole as directed. Follow up if not helping.   - ketoconazole 2 % External Shampoo; Apply 1 Application topically twice a week.  Dispense: 120 mL; Refill: 2  - ketoconazole 2 % External Cream; Apply 1 Application topically daily.  Dispense: 60 g; Refill: 2

## 2024-08-05 ENCOUNTER — TELEPHONE (OUTPATIENT)
Dept: FAMILY MEDICINE CLINIC | Facility: CLINIC | Age: 49
End: 2024-08-05

## 2024-08-05 DIAGNOSIS — R26.9 ABNORMALITY OF GAIT: ICD-10-CM

## 2024-08-05 DIAGNOSIS — R47.9 SPEAKING DIFFICULTY: ICD-10-CM

## 2024-08-05 DIAGNOSIS — R29.898 BILATERAL LEG WEAKNESS: ICD-10-CM

## 2024-08-05 DIAGNOSIS — G12.20: ICD-10-CM

## 2024-08-05 DIAGNOSIS — G12.21 AMYOTROPHIC LATERAL SCLEROSIS (HCC): Primary | ICD-10-CM

## 2024-08-05 DIAGNOSIS — Z91.89 AT RISK FOR ASPIRATION: ICD-10-CM

## 2024-08-05 DIAGNOSIS — R29.898 UPPER EXTREMITY WEAKNESS: ICD-10-CM

## 2024-08-05 NOTE — TELEPHONE ENCOUNTER
[facility] won't accept case. Will refer to [facility]. Referral entered as Urgent.

## 2024-08-08 NOTE — TELEPHONE ENCOUNTER
Has clinical questions that  need to be answered before they can say wether they can take patient      [phone]  opt 1 for intake then ask for leo

## 2024-08-08 NOTE — TELEPHONE ENCOUNTER
[facility] [phone]. Fx # [phone].  Faxed over patient information to see if they would accept patient as a client.

## 2024-08-08 NOTE — TELEPHONE ENCOUNTER
Noted, thank you so much. If she is going to switch to medicare this month, we can reach out to her and see if she wants to go ahead and start with Adrian and then reassess when her insurance changes.

## 2024-08-08 NOTE — TELEPHONE ENCOUNTER
Called [facility] - they asked for office notes, demographics and insurance information to be faxed over. All information faxed.

## 2024-08-14 ENCOUNTER — TELEPHONE (OUTPATIENT)
Dept: FAMILY MEDICINE CLINIC | Facility: CLINIC | Age: 49
End: 2024-08-14

## 2024-09-10 ENCOUNTER — TELEPHONE (OUTPATIENT)
Dept: FAMILY MEDICINE CLINIC | Facility: CLINIC | Age: 49
End: 2024-09-10

## 2024-09-10 NOTE — TELEPHONE ENCOUNTER
She has very bad itching on her skin all over.  She wants some medication for it.  Her sister has been giving her benadryl but doesn't want to have to that all the time.      On medication to help stop her period but she has had a constant period since she started the medication.  It is not working.      [facility] [address]

## 2024-09-10 NOTE — TELEPHONE ENCOUNTER
S/w sister Mariah, on HIPAA  Reports itching still persisting, wonders if meds are not working. She can't use her L arm and losing strength in Rt arm. Reports pt has her use a scrub daddy sponge on her skin to relieve the itch, helps but does not want to continue that if they can avoid.     Reports she was in hospital x a month, just home as of yesterday and is staying with her now.     No new or worse sx's w the itching. Wonders about an alternative--has appt 9/23. She is not sure if she is using the allegra D you rx'd. Pt is sleeping currently she will confirm once she awakes.     Also reports menses have continued in spite of taking Slynd.  Denies heavy bleeding but has been spotting over a month now with this medication.    Wonders if alternative for that?    Also asking if we can change her upcoming vs to a virtual as it is difficult for her to be transported.    She is aware you are off today but that I will forward.

## 2024-09-11 ENCOUNTER — TELEPHONE (OUTPATIENT)
Dept: FAMILY MEDICINE CLINIC | Facility: CLINIC | Age: 49
End: 2024-09-11

## 2024-09-11 RX ORDER — HYDROXYZINE HYDROCHLORIDE 25 MG/1
25 TABLET, FILM COATED ORAL 3 TIMES DAILY PRN
Qty: 30 TABLET | Refills: 1 | Status: SHIPPED | OUTPATIENT
Start: 2024-09-11

## 2024-09-11 RX ORDER — ACETAMINOPHEN AND CODEINE PHOSPHATE 120; 12 MG/5ML; MG/5ML
0.35 SOLUTION ORAL DAILY
Qty: 90 TABLET | Refills: 3 | Status: SHIPPED | OUTPATIENT
Start: 2024-09-11

## 2024-09-11 NOTE — TELEPHONE ENCOUNTER
I sent in an alternative OCP to replace Slynd. I also sent hydroxyzine but this may cause tiredness and should be taken with caution. She was given baclofen #90 pills dispensed yesterday which is a muscle relaxer so giving another one would not be safe.

## 2024-09-11 NOTE — TELEPHONE ENCOUNTER
Yes, we can do virtual instead. We can try hydroxyzine for itching if she wants, just let me know an I will prescribe. I can change her to a different OCP now if she wants or discuss more at the visit.

## 2024-09-11 NOTE — TELEPHONE ENCOUNTER
Spoke with sister appt changed to VV.  Sister states pt would like to try hydroxyzine .[facility].  They would also like to try different OCP now instead of waiting till appt.  Pt also c/o left shoulder discomfort to left shoulder and would like to try  a muscle relaxer.Discomfort started during transport home.Please advise

## 2024-09-12 ENCOUNTER — TELEPHONE (OUTPATIENT)
Dept: FAMILY MEDICINE CLINIC | Facility: CLINIC | Age: 49
End: 2024-09-12

## 2024-09-13 DIAGNOSIS — G43.009 MIGRAINE WITHOUT AURA AND WITHOUT STATUS MIGRAINOSUS, NOT INTRACTABLE: ICD-10-CM

## 2024-09-13 RX ORDER — RIZATRIPTAN BENZOATE 10 MG/1
TABLET ORAL
Qty: 12 TABLET | Refills: 0 | OUTPATIENT
Start: 2024-09-13

## 2024-09-13 NOTE — TELEPHONE ENCOUNTER
Patient has established care with a new Neurology group at [facility]. Spoke to pharmacy and provided contact information for new provider. Refill denied.    Medication: Rizatriptan 10mg     Date of last refill: 7/11/24 (#12/0)  [place]      Last office visit: 5/19/23  Due back to clinic per last office note:  3m  Date next office visit scheduled:    Future Appointments   Date Time Provider Department Center   9/23/2024 11:30 AM Arcelia Carmichael PA-C [facility] [address]           Last OV note recommendation:    MRI c spine  OT ordered  Rizatriptan prn ( states nurtec not helping)  See orders and medications filed with this encounter. The patient indicates understanding of these issues and agrees with the plan.  Discussed with patient regarding assessment, care plan, options for preventive and abortive meds.   RTC 2-3 months,   Pt should go ER for any new or worsening symptoms and contact office

## 2024-09-23 ENCOUNTER — TELEMEDICINE (OUTPATIENT)
Dept: FAMILY MEDICINE CLINIC | Facility: CLINIC | Age: 49
End: 2024-09-23

## 2024-09-23 DIAGNOSIS — Z02.9 ADMINISTRATIVE ENCOUNTER: Primary | ICD-10-CM

## 2024-09-23 NOTE — PROGRESS NOTES
Text link sent. No response/connection. Reschedule if needed. May also need to update primary contact information if changed. Thank you.

## 2025-06-08 ENCOUNTER — HEALTH MAINTENANCE LETTER (OUTPATIENT)
Age: 50
End: 2025-06-08

## (undated) DIAGNOSIS — G43.009 MIGRAINE WITHOUT AURA AND WITHOUT STATUS MIGRAINOSUS, NOT INTRACTABLE: ICD-10-CM

## (undated) NOTE — LETTER
[facility]     [] NEW APPLICANT  [address] [place] [id number]  [] RENEWAL            Persons with Disabilities Certification for Parking Placard  *This form is valid for three months from your physician's signature date for a Temporary Placard and six months for a Permanent Placard.    NOTE TO ALL DISABILITY LICENSE PLATE OWNERS:  If you have a disability license plate, you MUST complete the form and renew your placard.    DIRECTIONS: Both sides of this document must be signed and completed fully. All fields are required.   Applicants complete Part 1. If the applicant is a MINOR, then Parent/Guardian(s) MUST also complete Part 2. The applicant's medical professional MUSTcomplete Part 3. If the applicant is applying for meter-exempt parking, his/her medical professional MUST also complete Part 4.    PART 1:   Applicant Information (MUST have a valid [place] 's license and/or ID card)  I hereby certify  that I meet the definition of a person with a disability as provided in [address] [place] [address] 5/1-159.1, and I certify  that my physical condition entitles me to the issuance of a Persons with Disabilities Parking Placard. By affixing my signature below, I understand that the parking placard may not be used unless I am the  or passenger of the vehicle.     *If a  , please provide a copy of your  showing proof of service.     Disability Parking Placard # (if any)     Full Name of Person with Disability (If minor, complete Part 2 also)    Tere Rothman  Male/Female  female  Date of Birth   4/7/1975    Valid [place] ’s License or ID Card # of Applicant                                                                                                  [place] Address  [address] [place] [address]    Mailing Address if Different from Above   Telephone Number  [phone] (home)  Email Address   [email or web address]  ?  Yes/No  No     Signature of Person with Disability Today’s Date  8/1/2024     PART 2:   For Parent or Legal Guardian (MUST have a valid 's license and/or ID card)  I hereby certify that the above applicant is a minor and I have primary responsibility for his/her transportation. By affixing my signature below, I understand that the disability placard is issued to the person with the disability and may not be used unless I am transporting the disabled person in the vehicle.     Name of Parent or Legal Guardian   Relationship to Person with Disability   Valid [place] ’s License or ID Card #          [place] Address Apt/Unit# City State Zip   Telephone Number Email Address   Signature of Parent or Legal Guardian Today’s Date  8/1/2024     Warning: Any misuse of the disability parking placard/plates or making a false application may result in the revocation of the placard, a 12-month suspension or revocation of your drivers license, and a fine of up to $1,000.    Temporary Disabled Parking Placard Applications -- May be taken to any [facility] facility or mailed in.  Permanent Disables Parking Placard Applications -- MUST be mailed to the following address:  , Persons with Disabilities Placard Unit, [address] [place] [address], [address], [place] [address].    *If you have a permanent disability placard and would like a Persons with Disabilities License Plate, please visit your local  facility to apply. You will need your permanent placard number and current plate number or JORGE L.     Please complete Page 2 to ensure timely processing.    Printed by authority of the [facility]. July 2021 -- 1 -- VSD 62.28          Part 3: Medical Eligibility Standards and Medical Professionals Certification  As the medical professional(s) executing this document and verifying the nature of the applicant's disability, I understand that making a false representation of a person's  disability for the purposes of obtaining any type of a disabled parking placard may result in suspension or revocation of my license and a fine of up to $1,000. As a licensed physician, advanced practice nurse, optometrist, chiropractor or physician's assistant, I certify the applicant has a condition that constitutes him/her as a person with disabilities.   Length of Disability: (Check one)   []   Temporary Disability; the duration of this disability is _____________________ (maximum 6 months)  [x]   Permanent Disability  [x]   Meter-Exempt Disability (Must complete and sign Part 4 also.)  Check all that apply (MUST check at least one):  []  Is restricted by a lung disease to such a degree that the person’s forced (respiratory) expiratory volume (FEV) for 1 second, when measured by spirometry, is less than 1 liter.  []   Uses a portable oxygen device.  []   Has a Class III or Class IV cardiac condition according to the standards set by the American Heart Association.  [x]   Cannot walk without the use of or assistance from a wheelchair, a walker, a crutch, a brace, a prosthetic device or another person.  [x]   Is severely limited in the ability to walk due to an arthritic, neurological, oncological or orthopedic condition.  [x]   Cannot walk 200 feet without stopping to rest because of one of the above five conditions.  Check all that apply: (MUST check at least one diagnosis):  []Amputation of extremity(s)_________________ [] Arthritis of the ______________________  []Spina Bifida     []Osteoarthritis of the __________________   []Multiple Sclerosis    [] Chronic Pain due to ___________________  []Quadriplegia/Paraplegia   [] Legally Blind with limited mobility  [] Cerebral Palsy  [] Other Diagnosis: __ALS_______________________________________________________________    If none of the above conditions apply, list the medical condition that impacts the person’s mobility.     Medical Professional’s Printed Name*    Mary Carmen Carmichael PA-C Specialty     Office Address   [facility], [address] [place] [id number]  Dept: [phone]  Dept Fax: [phone]   Medical Professional’s Signature   State Professional License Number (NOT NPI#)  [id number] Today’s Date                  8/1/2024    Signature of Collaborating/Supervising Physician (if signed above by resident/assistant) Supervising State Professional License Number  [id number]             PART 4: Medical Eligibility for Meter-Exempt Parking  The meter-exempt parking certification must be completed only when the applicant qualifies. To qualify, the applicant MUST have a VALID  [place] 's license, have an ambulatory disability described in Part 3, and also have one of the following conditions listed below.  Economic need is not a consideration for meter-exempt parking    The applicant is eligible for meter-exempt parking as provided by statue due to the following PERMANENT medical condition or disability:    Check all that apply:  [x] Cannot manage, manipulate, or insert coins, or obtain tickets in parking meters/ticket machines due to lack of fine motor control of BOTH hands.  [x] Cannot reach above his/her head to a height of 42 inches from the ground due to a lack of finger, hand or upper-extremity strength or mobility.  [x] Cannot approach a parking meter due to his/her use of a wheelchair or other device for mobility.  [x] Cannot walk more than 20 feet due to an orthopedic, neurological, cardiovascular, or lung condition in which the degree of debilitation is so severe that it almost completely impedes the ability to walk.  [x] Missing a hand(s) or arm(s) or has permanently lost the use of a hand or arm.  [] Patient is under 18 years of age and incapable of driving.     Medical Professional’s Signature State Professional License Number (NOT NPI#)  [id number] Today’s Date                  8/1/2024     Signature of Collaborating/Supervising Physician (if signed above by resident/assistant) Supervising State Professional License Number     FOR  OFFICE USE ONLY     Parking Placard Number:  Expiration Date:   Issued By: Issued Date:

## (undated) NOTE — MR AVS SNAPSHOT
Elisabet Fortune [address] [place] [address]  [phone]               Thank you for choosing us for your health care visit with Larissa Mahoney MD.  We are glad to serve you and happy to provide you with this Start taking on:  7/29/2017           * amphetamine-dextroamphetamine 30 MG Tabs   Take 1 tablet (30 mg total) by mouth daily.    Commonly known as:  ADDERALL   Start taking on:  7/29/2017           BuPROPion HCl ER (XL) 150 MG Tb24   Take 1 tablet (150 mg SmartPay Jieyinhart questions? Call [phone] for help. SED Web is NOT to be used for urgent needs. For medical emergencies, dial 911.            Visit [facility] online at  [email or web address]

## (undated) NOTE — LETTER
08/26/19        Rhonda Sahu  [address] [address] [address]      Dear Kike Freed,    [facility] records indicate that you have outstanding lab work and or testing that was ordered for you and has not yet been completed:  Orders Placed This E

## (undated) NOTE — LETTER
09/25/19        Gail Colin  [address] [facility] [address]      Dear Soha Garcia,    [facility] [facility] records indicate that you have outstanding lab work and or testing that was ordered for you and has not yet been completed:  Orders Placed This E

## (undated) NOTE — MR AVS SNAPSHOT
Asia Fortune [address] [place] [address]  [phone]               Thank you for choosing us for your health care visit with Rob Espinal MD.  We are glad to serve you and happy to provide you with this · Insight about your emotions. · New tools for dealing with your problems. · Emotional support for making progress. Getting better takes time  Talk therapy will help you feel better. But change doesn’t happen right away.  Depression takes away your energ Take 1 tablet (150 mg total) by mouth daily.    Commonly known as:  WELLBUTRIN XL           * Famciclovir 500 MG Tabs   TAKE 2 TABLETS BY MOUTH EVERY 12 HOURS   Commonly known as:  FAMVIR           * Famciclovir 250 MG Tabs   Take 1 tablet (250 mg total) by Visit [facility] online at  [email or web address]

## (undated) NOTE — LETTER
09/01/21        Rhonda Sahu  [address] [address]      Dear Kike Freed,    [facility] [facility] records indicate that you have outstanding lab work and or testing that was ordered for you and has not yet been completed:  Orders Placed This E

## (undated) NOTE — MR AVS SNAPSHOT
Alexia Fortune [address] [place] [id number] [phone]               Thank you for choosing us for your health care visit with Cecily Thayer MD.  We are glad to serve you and happy to provide you with this do all the things you used to do. Set small goals and do what you can. Take care of your body  People with depression often lose the desire to take care of themselves. That only makes their problems worse.  During treatment and afterward, make a point to: It can also help you understand how depression is clouding your thinking, not letting you see the world the way it really is. Therapy can give you:  · Insight about your emotions. · New tools for dealing with your problems.   · Emotional support for making * Amphetamine-Dextroamphet ER 20 MG Cp24   Take 1 capsule (20 mg total) by mouth daily. Commonly known as:  ADDERALL XR           * amphetamine-dextroamphetamine 30 MG Tabs   Take 1 tablet (30 mg total) by mouth daily.    Commonly known as:  ADDER You can access your MyChart to more actively manage your health care and view more details from this visit by going to [email or web address].   If you've recently had a stay at the Hospital you can access your discharge instructions in [address] by cynthia

## (undated) NOTE — LETTER
11/09/20        Isela [place]  [address] [address]      Dear Jh Nieves,    [address] [facility] records indicate that you have outstanding lab work and or testing that was ordered for you and has not yet been completed:  Orders Placed This Encou

## (undated) NOTE — LETTER
08/26/21        Temo Gtz  [address] [address]      Dear Sondra Alvarado,    [address] records indicate that you have outstanding lab work and or testing that was ordered for you and has not yet been completed:  Orders Placed This E

## (undated) NOTE — LETTER
07/26/21        Rhonda Sahu  [address] [address]      Dear Kike Freed,    [address] [facility] records indicate that you have outstanding lab work and or testing that was ordered for you and has not yet been completed:  Orders Placed This E

## (undated) NOTE — LETTER
10/01/20        Ludwin Sero  [address] [address]      Dear Jemma Mckeon,    [address] [facility] records indicate that you have outstanding lab work and or testing that was ordered for you and has not yet been completed:  Orders Placed This Encou

## (undated) NOTE — LETTER
11/29/17        [facility]  [address] [facility] [address]      Dear Cesia Britt,    [address] [facility] records indicate that you have outstanding lab work and or testing that was ordered for you and has not yet been completed:          MARIAM DEL VALLE Differ